# Patient Record
Sex: MALE | Race: WHITE | Employment: UNEMPLOYED | URBAN - METROPOLITAN AREA
[De-identification: names, ages, dates, MRNs, and addresses within clinical notes are randomized per-mention and may not be internally consistent; named-entity substitution may affect disease eponyms.]

---

## 2017-02-14 DIAGNOSIS — E03.4 HYPOTHYROIDISM DUE TO ACQUIRED ATROPHY OF THYROID: ICD-10-CM

## 2017-02-14 RX ORDER — BUSPIRONE HYDROCHLORIDE 7.5 MG/1
TABLET ORAL
Qty: 45 TAB | Refills: 0 | Status: SHIPPED | OUTPATIENT
Start: 2017-02-14 | End: 2017-04-20 | Stop reason: SDUPTHER

## 2017-02-14 RX ORDER — LEVOTHYROXINE SODIUM 300 UG/1
TABLET ORAL
Qty: 30 TAB | Refills: 0 | Status: SHIPPED | OUTPATIENT
Start: 2017-02-14 | End: 2017-04-21 | Stop reason: SDUPTHER

## 2017-03-19 NOTE — TELEPHONE ENCOUNTER
Patient is requesting a refill on  Requested Prescriptions     Pending Prescriptions Disp Refills    FLUoxetine (PROZAC) 20 mg capsule 30 Cap 5     Thank you

## 2017-03-20 RX ORDER — FLUOXETINE HYDROCHLORIDE 20 MG/1
CAPSULE ORAL
Qty: 30 CAP | Refills: 5 | OUTPATIENT
Start: 2017-03-20

## 2017-03-21 DIAGNOSIS — F41.8 DEPRESSION WITH ANXIETY: Primary | ICD-10-CM

## 2017-03-21 RX ORDER — OMEPRAZOLE 40 MG/1
CAPSULE, DELAYED RELEASE ORAL
Qty: 30 CAP | Refills: 0 | Status: SHIPPED | OUTPATIENT
Start: 2017-03-21 | End: 2017-04-20 | Stop reason: SDUPTHER

## 2017-03-21 RX ORDER — FLUOXETINE HYDROCHLORIDE 20 MG/1
CAPSULE ORAL
Qty: 30 CAP | Refills: 0 | Status: SHIPPED | OUTPATIENT
Start: 2017-03-21 | End: 2017-04-20 | Stop reason: ALTCHOICE

## 2017-03-21 NOTE — TELEPHONE ENCOUNTER
Requested Prescriptions     Pending Prescriptions Disp Refills    omeprazole (PRILOSEC) 40 mg capsule 30 Cap 0

## 2017-04-20 ENCOUNTER — OFFICE VISIT (OUTPATIENT)
Dept: FAMILY MEDICINE CLINIC | Age: 47
End: 2017-04-20

## 2017-04-20 VITALS
SYSTOLIC BLOOD PRESSURE: 124 MMHG | WEIGHT: 211 LBS | HEART RATE: 69 BPM | BODY MASS INDEX: 29.54 KG/M2 | OXYGEN SATURATION: 95 % | RESPIRATION RATE: 16 BRPM | HEIGHT: 71 IN | TEMPERATURE: 98.5 F | DIASTOLIC BLOOD PRESSURE: 81 MMHG

## 2017-04-20 DIAGNOSIS — I10 ESSENTIAL HYPERTENSION: ICD-10-CM

## 2017-04-20 DIAGNOSIS — E11.8 TYPE 2 DIABETES MELLITUS WITH COMPLICATION, WITHOUT LONG-TERM CURRENT USE OF INSULIN (HCC): Primary | ICD-10-CM

## 2017-04-20 DIAGNOSIS — E03.9 HYPOTHYROIDISM, UNSPECIFIED TYPE: ICD-10-CM

## 2017-04-20 DIAGNOSIS — Z28.21 REFUSED PNEUMOCOCCAL VACCINATION: ICD-10-CM

## 2017-04-20 DIAGNOSIS — F41.8 DEPRESSION WITH ANXIETY: ICD-10-CM

## 2017-04-20 DIAGNOSIS — Z13.220 SCREENING FOR HYPERCHOLESTEROLEMIA: ICD-10-CM

## 2017-04-20 DIAGNOSIS — K21.9 GASTROESOPHAGEAL REFLUX DISEASE WITHOUT ESOPHAGITIS: ICD-10-CM

## 2017-04-20 LAB
ALBUMIN UR QL STRIP: 10 MG/L
CREATININE, URINE POC: 50 MG/DL
MICROALBUMIN/CREAT RATIO POC: NORMAL MG/G

## 2017-04-20 RX ORDER — BISOPROLOL FUMARATE AND HYDROCHLOROTHIAZIDE 5; 6.25 MG/1; MG/1
TABLET ORAL
Qty: 90 TAB | Refills: 1 | Status: SHIPPED | OUTPATIENT
Start: 2017-04-20 | End: 2017-10-16 | Stop reason: SDUPTHER

## 2017-04-20 RX ORDER — OMEPRAZOLE 40 MG/1
CAPSULE, DELAYED RELEASE ORAL
Qty: 30 CAP | Refills: 2 | Status: SHIPPED | OUTPATIENT
Start: 2017-04-20 | End: 2018-03-28 | Stop reason: SDUPTHER

## 2017-04-20 RX ORDER — BUSPIRONE HYDROCHLORIDE 7.5 MG/1
7.5 TABLET ORAL 2 TIMES DAILY
Qty: 60 TAB | Refills: 2 | Status: SHIPPED | OUTPATIENT
Start: 2017-04-20 | End: 2017-07-20 | Stop reason: SDUPTHER

## 2017-04-20 RX ORDER — BUPROPION HYDROCHLORIDE 150 MG/1
150 TABLET ORAL
Qty: 30 TAB | Refills: 2 | Status: SHIPPED | OUTPATIENT
Start: 2017-04-20 | End: 2017-07-18 | Stop reason: SDUPTHER

## 2017-04-20 NOTE — PROGRESS NOTES
Chika Ballard. is a 48 y.o. male with history of HTN, hypothyroidism, diabetes mellitus, TRACY, renal calculi, anxiety and depression who presents with follow up of hypothyroidism, HTN, depression, anxiety and diabetes. History provided by: patient    HPI    Hypertension  Diet: poor appetite, eats tuna fish, chicken, salad, fried chicken, fruits, having lots of orange juice, milk  Exercise: yard work 3-4x/week for 4-5 hrs  BP at home: not checking  Compliance with medications: no medications for 5 days     Hypothyroidism  Takes synthroid 300 mcg M-F and skips Saturday to Sunday   Denies constipation, dry skin, dry hair, poor concentration, cold intolerance    Diabetes Mellitus  - Blood glucose at home: not checking  - Blood pressure at home: not checking  - Compliance with medications: taking metformin only once a day  - Patient on ASA - no, will reassess ASCVD risk factors today  - Patient on Statin- no, will reassess ASCVD risk factors today  - Patient on ACE- no  - Diet- poor appetite, eats tuna fish, chicken, salad, fried chicken, fruits, having lots of orange juice, milk  - Exercise - yard work 3-4x/week for 4-5 hrs  - Blood pressure today - 147/103 and repeat 124/81  - Last eye check - has not seen   - Last cholesterol check - today  - Last HbA1c - today  - Last urine microalbulmin- today, abnormal  - Last comprehensive foot exam - today  - Does the patient have a cardiologist- no  - Does the patient have a nephrologist- no  - Did patient receive pneumococcal vaccine - declined    Positive tingling on bilateral feet, nocturia, blurry vision    Depression/anxiety  Reports having a few deaths in the family, has not been doing well.  Is feeling sad and depressed  Sleep changes: not sleeping well  Interest (loss): yes, does not enjoy anything  Guilt (worthless): no  Energy (lack): yes  Cognition/C oncentration: no trouble  Appetite (wt. Loss): poor  Psychomotor retardation: no  SI/HI: no    Exercise: yard work 3-4x/week for 4-5 hrs  Current medication: prozac and buspar  Compliance with medication: No prozac for last couple of weeks and only using buspar prn  Side effects from the medication: no  Previous medications tried: wellbutrin XL, did well on that in the past and wishes to go back  Counseling: no  Psychiatrist: no    Tobacco abuse  1/2 PPD  Not ready to quit    Patient Active Problem List   Diagnosis Code    Hypothyroid E03.9    Kidney stone N20.0    Anxiety disorder F41.9    Depression with anxiety F41.8    Lipid disorder E78.9    Smoker F17.200    HTN (hypertension) I10    TRACY on CPAP G47.33, Z99.89    T2DM (type 2 diabetes mellitus) ([facility] [address] E11.9    Poor dentition K08.9    Stress and adjustment reaction F43.29          Current Outpatient Prescriptions:     busPIRone (BUSPAR) 7.5 mg tablet, Take 1 Tab by mouth two (2) times a day., Disp: 60 Tab, Rfl: 2    bisoprolol-hydroCHLOROthiazide (ZIAC) 5-6.25 mg per tablet, TAKE ONE TABLET BY MOUTH ONCE DAILY, Disp: 90 Tab, Rfl: 1    buPROPion XL (WELLBUTRIN XL) 150 mg tablet, Take 1 Tab by mouth every morning., Disp: 30 Tab, Rfl: 2    omeprazole (PRILOSEC) 40 mg capsule, TAKE ONE CAPSULE BY MOUTH ONCE DAILY, Disp: 30 Cap, Rfl: 2    levothyroxine (SYNTHROID) 300 mcg tablet, TAKE ONE TABLET BY MOUTH ONCE DAILY BEFORE BREAKFAST, Disp: 30 Tab, Rfl: 0    metFORMIN (GLUCOPHAGE) 1,000 mg tablet, TAKE ONE TABLET BY MOUTH TWICE DAILY WITH MEALS, Disp: 180 Tab, Rfl: 3     No Known Allergies     Past Medical History:   Diagnosis Date    Anxiety     Chest pain     Diverticulosis     Hypothyroid 7/20/2010    Kidney stone 7/20/2010     ROS  No numbness, weakness, chest pain, SOB    Physical Exam   Constitutional: He is oriented to person, place, and time and well-developed, well-nourished, and in no distress.    /81 (BP 1 Location: Right arm, BP Patient Position: Sitting)  Pulse 69  Temp 98.5 °F (36.9 °C) (Oral)   Resp 16  Ht 5' 11\" (1.803 m)  Wt 211 lb (95.7 kg)  SpO2 95%  BMI 29.43 kg/m2    HENT:   Head: Normocephalic and atraumatic. Neck: Neck supple. No carotid bruit bilaterally   Cardiovascular: Normal rate, regular rhythm, normal heart sounds and intact distal pulses. Exam reveals no gallop and no friction rub. No murmur heard. Pulmonary/Chest: Effort normal and breath sounds normal. No respiratory distress. He has no wheezes. He has no rales. Neurological: He is alert and oriented to person, place, and time. Psychiatric: Mood and affect normal.   Vitals reviewed. Diabetic Foot Exam:  Left/Right: Reflexes 2+     Vibratory sensation normal    Proprioception normal    Filament test normal sensation with micro filament   Pulse DP: 2+ (normal)   Pulse PT: 2+ (normal)   Deformities: None, No onychomycosis   Motor: moves all extremities       Data:  Urine microalbuminuria:  Component Results   Component Value Flag Ref Range Units Status   ALBUMIN, URINE POC 10  Negative mg/L Final   CREATININE, URINE POC 50   mg/dL Final   Microalbumin/creat ratio (POC)    mg/g Final   Comment:   Abnormal     Assessment/Plan:   Huong Chopra is a 48 y.o. male with history of HTN, hypothyroidism, diabetes mellitus, TRACY, renal calculi, anxiety and depression who presents with follow up of hypothyroidism, HTN, depression, anxiety and diabetes. ICD-10-CM ICD-9-CM    1. Type 2 diabetes mellitus with complication, without long-term current use of insulin (HCC) E11.8 250.90  DIABETES EYE EXAM       DIABETES FOOT EXAM      REFERRAL TO OPHTHALMOLOGY      HEMOGLOBIN A1C WITH EAG      LIPID PANEL      METABOLIC PANEL, COMPREHENSIVE      AMB POC URINE, MICROALBUMIN, SEMIQUANT (3 RESULTS)   2. Essential hypertension I10 401.9 bisoprolol-hydroCHLOROthiazide (ZIAC) 5-6.25 mg per tablet      METABOLIC PANEL, COMPREHENSIVE   3. Hypothyroidism, unspecified type E03.9 244.9 TSH 3RD GENERATION   4.  Depression with anxiety F41.8 300.4 busPIRone (BUSPAR) 7.5 mg tablet      buPROPion XL (WELLBUTRIN XL) 150 mg tablet      REFERRAL TO BEHAVIORAL HEALTH   5. Refused pneumococcal vaccination Z28.21 V64.06    6. Screening for hypercholesterolemia Z13.220 V77.91 LIPID PANEL   7. Gastroesophageal reflux disease without esophagitis K21.9 530.81 omeprazole (PRILOSEC) 40 mg capsule     1. Type 2 diabetes mellitus with complication, without long-term current use of insulin ([facility] [address]  Will check labs again. Continue metformin. Advised to take the metformin bid.   -  DIABETES EYE EXAM  -  DIABETES FOOT EXAM  - REFERRAL TO OPHTHALMOLOGY  - HEMOGLOBIN A1C WITH EAG  - LIPID PANEL  - METABOLIC PANEL, COMPREHENSIVE  - AMB POC URINE, MICROALBUMIN, SEMIQUANT (3 RESULTS)  - Exercise at least 150 mins/week. It has been shown to increase the lifespan of diabetics. I encourage an active lifestyle that includes regular exercise. - Follow a diabetic diet. For diet information go to [email or web address] or call [phone] to speak with a dietician at [facility]  - Diabetes is the leading cause of blindness in the [place] It is important that you see an eye doctor every year for a dilated retinal exam.   - You may benefit from the Diabetic Treatment [facility] at [facility]. For more information, please call [phone]  - will repeat urine microalbuminuria and if still elevated will need to start ACE inhibitor    2. Essential hypertension  BP goal <140/90. At goal on repeat read in office. Continue ziac  - bisoprolol-hydroCHLOROthiazide (ZIAC) 5-6.25 mg per tablet; TAKE ONE TABLET BY MOUTH ONCE DAILY  Dispense: 90 Tab; Refill: 1  - METABOLIC PANEL, COMPREHENSIVE  - check BP at home and bring readings for review  - Exercise at least 150 mins/week  - weight loss  - DASH diet  - discussed the risks of uncontrolled HTN including, but not limited to heart disease, heart attack, stroke     3. Hypothyroidism, unspecified type  No symptoms. Continue present dose of synthroid.  Will refill once TSH is back to know the appropriate dose.   - TSH 3RD GENERATION    4. Depression with anxiety  Continues to have symptoms. Off of prozac for 1 weel. Wishes to be on wellbutrin. Will start wellbutrin and schedule buspar  - busPIRone (BUSPAR) 7.5 mg tablet; Take 1 Tab by mouth two (2) times a day. Dispense: 60 Tab; Refill: 2  - buPROPion XL (WELLBUTRIN XL) 150 mg tablet; Take 1 Tab by mouth every morning. Dispense: 30 Tab; Refill: 2  - REFERRAL TO BEHAVIORAL HEALTH  - Exercise at least 150 mins/week   - counselor    5. Refused pneumococcal vaccination  Counseled    6. Screening for hypercholesterolemia  - LIPID PANEL    7. Gastroesophageal reflux disease without esophagitis  Refills   - omeprazole (PRILOSEC) 40 mg capsule; TAKE ONE CAPSULE BY MOUTH ONCE DAILY  Dispense: 30 Cap; Refill: 2    Follow-up Disposition:  Return in about 1 week (around 4/27/2017) for depression and anxiety. I have discussed the diagnosis with the patient and the intended plan as seen in the above orders. The patient has received an after-visit summary and questions were answered concerning future plans. I have discussed medication side effects and warnings with the patient as well.     Patient discussed with Dr Zara Rollins MD  Family Medicine Resident (PGY-3)  4/20/2017

## 2017-04-20 NOTE — PROGRESS NOTES
Chief Complaint   Patient presents with    Medication Refill     synthroid and bisoprolol     1. Have you been to the ER, urgent care clinic since your last visit? Hospitalized since your last visit? No    2. Have you seen or consulted any other health care providers outside of the [address] [place] [address] since your last visit? Include any pap smears or colon screening.  No      Wants to see if he can switch back to wellbutrin and xanax    Current anxiety medication is not working

## 2017-04-20 NOTE — MR AVS SNAPSHOT
Visit Information Date & Time Provider Department Dept. Phone Encounter #  
 4/20/2017  8:00 AM Princess Huizar, [facility] [phone] [id number] Follow-up Instructions Return in about 1 week (around 4/27/2017) for depression and anxiety. Upcoming Health Maintenance Date Due  
 EYE EXAM RETINAL OR DILATED Q1 9/20/1978 Pneumococcal 19-64 Medium Risk (1 of 1 - PPSV23) 9/20/1987 MICROALBUMIN Q1 4/9/2016 LIPID PANEL Q1 4/9/2016 INFLUENZA AGE 9 TO ADULT 8/1/2016 HEMOGLOBIN A1C Q6M 9/8/2016 FOOT EXAM Q1 3/8/2017 DTaP/Tdap/Td series (2 - Td) 5/14/2024 Allergies as of 4/20/2017  Review Complete On: 4/20/2017 By: Princess Huizar MD  
 No Known Allergies Current Immunizations  Reviewed on 4/9/2015 Name Date Tdap 5/14/2014 Not reviewed this visit You Were Diagnosed With   
  
 Codes Comments Type 2 diabetes mellitus with complication, without long-term current use of insulin (HCC)    -  Primary ICD-10-CM: E11.8 ICD-9-CM: 250.90 Essential hypertension     ICD-10-CM: I10 
ICD-9-CM: 401.9 Hypothyroidism, unspecified type     ICD-10-CM: E03.9 ICD-9-CM: 244.9 Depression with anxiety     ICD-10-CM: F41.8 ICD-9-CM: 300.4 Refused pneumococcal vaccination     ICD-10-CM: R15.32 ICD-9-CM: V64.06 Screening for hypercholesterolemia     ICD-10-CM: Z13.220 ICD-9-CM: V77.91 Vitals BP Pulse Temp Resp Height(growth percentile) Weight(growth percentile) 124/81 (BP 1 Location: Right arm, BP Patient Position: Sitting) 69 98.5 °F (36.9 °C) (Oral) 16 5' 11\" (1.803 m) 211 lb (95.7 kg) SpO2 BMI Smoking Status 95% 29.43 kg/m2 Current Every Day Smoker Vitals History BMI and BSA Data Body Mass Index Body Surface Area  
 29.43 kg/m 2 2.19 m 2 Preferred Pharmacy Pharmacy Name Phone [facility] [address], [address] [phone] Your Updated Medication List  
  
   
This list is accurate as of: 4/20/17  8:50 AM.  Always use your most recent med list.  
  
  
  
  
 bisoprolol-hydroCHLOROthiazide 5-6.25 mg per tablet Commonly known as:  [facility] TAKE ONE TABLET BY MOUTH ONCE DAILY  
  
 buPROPion  mg tablet Commonly known as:  [place] [facility] Take 1 Tab by mouth every morning. busPIRone 7.5 mg tablet Commonly known as:  BUSPAR Take 1 Tab by mouth two (2) times a day. levothyroxine 300 mcg tablet Commonly known as:  SYNTHROID  
TAKE ONE TABLET BY MOUTH ONCE DAILY BEFORE BREAKFAST  
  
 metFORMIN 1,000 mg tablet Commonly known as:  GLUCOPHAGE  
TAKE ONE TABLET BY MOUTH TWICE DAILY WITH MEALS  
  
 omeprazole 40 mg capsule Commonly known as:  PRILOSEC  
TAKE ONE CAPSULE BY MOUTH ONCE DAILY Prescriptions Sent to Pharmacy Refills  
 busPIRone (BUSPAR) 7.5 mg tablet 2 Sig: Take 1 Tab by mouth two (2) times a day. Class: Normal  
 Pharmacy: [facility] [address] [place] [address], [address] [place] [address] Ph #: [phone] Route: Oral  
 bisoprolol-hydroCHLOROthiazide (ZIAC) 5-6.25 mg per tablet 1 Sig: TAKE ONE TABLET BY MOUTH ONCE DAILY Class: Normal  
 Pharmacy: [id number] [place] [address] Ph #: [phone]  
 buPROPion XL (WELLBUTRIN XL) 150 mg tablet 2 Sig: Take 1 Tab by mouth every morning. Class: Normal  
 Pharmacy: [id number] [place] [address] Ph #: [phone] Route: Oral  
  
We Performed the Following AMB POC URINE, MICROALBUMIN, SEMIQUANT (3 RESULTS) [67001 CPT(R)] HEMOGLOBIN A1C WITH EAG [58261 CPT(R)]  DIABETES EYE EXAM [6 Custom]  DIABETES FOOT EXAM [7 Custom] LIPID PANEL [02528 CPT(R)] METABOLIC PANEL, COMPREHENSIVE [35710 CPT(R)] REFERRAL TO BEHAVIORAL HEALTH [REF8 Custom] Comments: Please evaluate and treat patient for anxiety and depression. REFERRAL TO OPHTHALMOLOGY [REF57 Custom] Comments:  
 Please evaluate patient for diabetic eye exam.  
 TSH 3RD GENERATION [41977 CPT(R)] Follow-up Instructions Return in about 1 week (around 4/27/2017) for depression and anxiety. Referral Information Referral ID Referred By Referred To  
  
 [id number] Tiffanie Olson Not Available Visits Status Start Date End Date 1 New Request 4/20/17 4/20/18 If your referral has a status of pending review or denied, additional information will be sent to support the outcome of this decision. Referral ID Referred By Referred To  
 [id number] Tiffanie Grad Not Available Visits Status Start Date End Date 1 New Request 4/20/17 4/20/18 If your referral has a status of pending review or denied, additional information will be sent to support the outcome of this decision. Patient Instructions - check BP at home and bring readings for review - Exercise at least 150 mins/week. It has been shown to increase the lifespan of diabetics. I encourage an active lifestyle that includes regular exercise. - Follow a diabetic diet. For diet information go to www. [employer]ATRIGHT. org or call [phone] to speak with a dietician at [facility] 
- Diabetes is the leading cause of blindness in the [place] It is important that you see an eye doctor every year for a dilated retinal exam.   
- You may benefit from the Diabetic Treatment Center at [facility]. For more information, please call [phone] 
- it is very important that you see a counselor, call your community service board Learning About Diabetes Food Guidelines Your Care Instructions Meal planning is important to manage diabetes. It helps keep your blood sugar at a target level (which you set with your doctor).  You don't have to eat special foods. You can eat what your family eats, including sweets once in a while. But you do have to pay attention to how often you eat and how much you eat of certain foods. You may want to work with a dietitian or a certified diabetes educator (CDE) to help you plan meals and snacks. A dietitian or CDE can also help you lose weight if that is one of your goals. What should you know about eating carbs? Managing the amount of carbohydrate (carbs) you eat is an important part of healthy meals when you have diabetes. Carbohydrate is found in many foods. · Learn which foods have carbs. And learn the amounts of carbs in different foods. ¨ Bread, cereal, pasta, and rice have about 15 grams of carbs in a serving. A serving is 1 slice of bread (1 ounce), ½ cup of cooked cereal, or 1/3 cup of cooked pasta or rice. ¨ Fruits have 15 grams of carbs in a serving. A serving is 1 small fresh fruit, such as an apple or orange; ½ of a banana; ½ cup of cooked or canned fruit; ½ cup of fruit juice; 1 cup of melon or raspberries; or 2 tablespoons of dried fruit. ¨ Milk and no-sugar-added yogurt have 15 grams of carbs in a serving. A serving is 1 cup of milk or 2/3 cup of no-sugar-added yogurt. ¨ Starchy vegetables have 15 grams of carbs in a serving. A serving is ½ cup of mashed potatoes or sweet potato; 1 cup winter squash; ½ of a small baked potato; ½ cup of cooked beans; or ½ cup cooked corn or green peas. · Learn how much carbs to eat each day and at each meal. A dietitian or CDE can teach you how to keep track of the amount of carbs you eat. This is called carbohydrate counting. · If you are not sure how to count carbohydrate grams, use the Plate Method to plan meals. It is a good, quick way to make sure that you have a balanced meal. It also helps you spread carbs throughout the day. ¨ Divide your plate by types of foods.  Put non-starchy vegetables on half the plate, meat or other protein food on one-quarter of the plate, and a grain or starchy vegetable in the final quarter of the plate. To this you can add a small piece of fruit and 1 cup of milk or yogurt, depending on how many carbs you are supposed to eat at a meal. 
· Try to eat about the same amount of carbs at each meal. Do not \"save up\" your daily allowance of carbs to eat at one meal. 
· Proteins have very little or no carbs per serving. Examples of proteins are beef, chicken, turkey, fish, eggs, tofu, cheese, cottage cheese, and peanut butter. A serving size of meat is 3 ounces, which is about the size of a deck of cards. Examples of meat substitute serving sizes (equal to 1 ounce of meat) are 1/4 cup of cottage cheese, 1 egg, 1 tablespoon of peanut butter, and ½ cup of tofu. How can you eat out and still eat healthy? · Learn to estimate the serving sizes of foods that have carbohydrate. If you measure food at home, it will be easier to estimate the amount in a serving of restaurant food. · If the meal you order has too much carbohydrate (such as potatoes, corn, or baked beans), ask to have a low-carbohydrate food instead. Ask for a salad or green vegetables. · If you use insulin, check your blood sugar before and after eating out to help you plan how much to eat in the future. · If you eat more carbohydrate at a meal than you had planned, take a walk or do other exercise. This will help lower your blood sugar. What else should you know? · Limit saturated fat, such as the fat from meat and dairy products. This is a healthy choice because people who have diabetes are at higher risk of heart disease. So choose lean cuts of meat and nonfat or low-fat dairy products. Use olive or canola oil instead of butter or shortening when cooking. · Don't skip meals. Your blood sugar may drop too low if you skip meals and take insulin or certain medicines for diabetes. · Check with your doctor before you drink alcohol. Alcohol can cause your blood sugar to drop too low. Alcohol can also cause a bad reaction if you take certain diabetes medicines. Follow-up care is a key part of your treatment and safety. Be sure to make and go to all appointments, and call your doctor if you are having problems. It's also a good idea to know your test results and keep a list of the medicines you take. Where can you learn more? Go to http://cathleen-filipe.info/. Enter D694 in the search box to learn more about \"Learning About Diabetes Food Guidelines. \" Current as of: May 23, 2016 Content Version: 11.2 © 5587-2403 [employer]. Care instructions adapted under license by [employer] (which disclaims liability or warranty for this information). If you have questions about a medical condition or this instruction, always ask your healthcare professional. [place] [address] any warranty or liability for your use of this information. Introducing [facility]! Dear Nina Sumner: 
Thank you for requesting a PayPar[employer] account. Our records indicate that you already have an active PayParrot account. You can access your account anytime at [email or web address] Did you know that you can access your hospital and ER discharge instructions at any time in PayParrot? You can also review all of your test results from your hospital stay or ER visit. Additional Information If you have questions, please visit the Frequently Asked Questions section of the Pay[employer]rot website at [email or web address] Remember, PayParrot is NOT to be used for urgent needs. For medical emergencies, dial 911. Now available from your iPhone and Android! Please provide this summary of care documentation to your next provider. Your primary care clinician is listed as Farzad Nieto.  If you have any questions after today's visit, please call [phone].

## 2017-04-21 ENCOUNTER — OFFICE VISIT (OUTPATIENT)
Dept: FAMILY MEDICINE CLINIC | Age: 47
End: 2017-04-21

## 2017-04-21 ENCOUNTER — TELEPHONE (OUTPATIENT)
Dept: FAMILY MEDICINE CLINIC | Age: 47
End: 2017-04-21

## 2017-04-21 VITALS
WEIGHT: 211 LBS | BODY MASS INDEX: 29.54 KG/M2 | DIASTOLIC BLOOD PRESSURE: 63 MMHG | OXYGEN SATURATION: 100 % | RESPIRATION RATE: 16 BRPM | HEIGHT: 71 IN | HEART RATE: 60 BPM | SYSTOLIC BLOOD PRESSURE: 102 MMHG | TEMPERATURE: 97.7 F

## 2017-04-21 DIAGNOSIS — R80.9 TYPE 2 DIABETES MELLITUS WITH MICROALBUMINURIA, WITHOUT LONG-TERM CURRENT USE OF INSULIN (HCC): ICD-10-CM

## 2017-04-21 DIAGNOSIS — E11.29 TYPE 2 DIABETES MELLITUS WITH MICROALBUMINURIA, WITHOUT LONG-TERM CURRENT USE OF INSULIN (HCC): ICD-10-CM

## 2017-04-21 DIAGNOSIS — E78.5 HYPERLIPIDEMIA, UNSPECIFIED HYPERLIPIDEMIA TYPE: ICD-10-CM

## 2017-04-21 DIAGNOSIS — E03.4 HYPOTHYROIDISM DUE TO ACQUIRED ATROPHY OF THYROID: Primary | ICD-10-CM

## 2017-04-21 LAB
ALBUMIN SERPL-MCNC: 4.8 G/DL (ref 3.5–5.5)
ALBUMIN/GLOB SERPL: 1.7 {RATIO} (ref 1.2–2.2)
ALP SERPL-CCNC: 76 IU/L (ref 39–117)
ALT SERPL-CCNC: 40 IU/L (ref 0–44)
AST SERPL-CCNC: 22 IU/L (ref 0–40)
BILIRUB SERPL-MCNC: 0.8 MG/DL (ref 0–1.2)
BUN SERPL-MCNC: 10 MG/DL (ref 6–24)
BUN/CREAT SERPL: 11 (ref 9–20)
CALCIUM SERPL-MCNC: 9.4 MG/DL (ref 8.7–10.2)
CHLORIDE SERPL-SCNC: 89 MMOL/L (ref 96–106)
CHOLEST SERPL-MCNC: 247 MG/DL (ref 100–199)
CO2 SERPL-SCNC: 23 MMOL/L (ref 18–29)
CREAT SERPL-MCNC: 0.92 MG/DL (ref 0.76–1.27)
EST. AVERAGE GLUCOSE BLD GHB EST-MCNC: 329 MG/DL
GLOBULIN SER CALC-MCNC: 2.9 G/DL (ref 1.5–4.5)
GLUCOSE SERPL-MCNC: 567 MG/DL (ref 65–99)
HBA1C MFR BLD: 13.1 % (ref 4.8–5.6)
HDLC SERPL-MCNC: 27 MG/DL
INTERPRETATION, 910389: NORMAL
LDLC SERPL CALC-MCNC: ABNORMAL MG/DL (ref 0–99)
Lab: NORMAL
POTASSIUM SERPL-SCNC: 5 MMOL/L (ref 3.5–5.2)
PROT SERPL-MCNC: 7.7 G/DL (ref 6–8.5)
SODIUM SERPL-SCNC: 133 MMOL/L (ref 134–144)
TRIGL SERPL-MCNC: 882 MG/DL (ref 0–149)
TSH SERPL DL<=0.005 MIU/L-ACNC: 95.61 UIU/ML (ref 0.45–4.5)
VLDLC SERPL CALC-MCNC: ABNORMAL MG/DL (ref 5–40)

## 2017-04-21 RX ORDER — INSULIN PUMP SYRINGE, 3 ML
EACH MISCELLANEOUS
Qty: 1 KIT | Refills: 0 | Status: SHIPPED | OUTPATIENT
Start: 2017-04-21

## 2017-04-21 RX ORDER — LEVOTHYROXINE SODIUM 300 UG/1
TABLET ORAL
Qty: 45 TAB | Refills: 1 | Status: SHIPPED | OUTPATIENT
Start: 2017-04-21 | End: 2017-10-02

## 2017-04-21 RX ORDER — GLIPIZIDE 5 MG/1
5 TABLET, FILM COATED, EXTENDED RELEASE ORAL DAILY
Qty: 30 TAB | Refills: 2 | Status: SHIPPED | OUTPATIENT
Start: 2017-04-21 | End: 2017-07-18 | Stop reason: SDUPTHER

## 2017-04-21 RX ORDER — LANCETS
EACH MISCELLANEOUS
Qty: 1 EACH | Refills: 11 | Status: SHIPPED | OUTPATIENT
Start: 2017-04-21

## 2017-04-21 RX ORDER — ISOPROPYL ALCOHOL 70 ML/100ML
SWAB TOPICAL
Qty: 100 PAD | Refills: 1 | Status: SHIPPED | OUTPATIENT
Start: 2017-04-21 | End: 2021-08-27 | Stop reason: ALTCHOICE

## 2017-04-21 RX ORDER — ATORVASTATIN CALCIUM 80 MG/1
80 TABLET, FILM COATED ORAL DAILY
Qty: 30 TAB | Refills: 5 | Status: SHIPPED | OUTPATIENT
Start: 2017-04-21 | End: 2017-10-05 | Stop reason: SDUPTHER

## 2017-04-21 NOTE — PROGRESS NOTES
Chief Complaint   Patient presents with    Abnormal Lab Results     Followup on recent visit / lab results.

## 2017-04-21 NOTE — PROGRESS NOTES
Abnormal labs found yesterday  TSH 95.6  Has not had free T4 -- recommend checking    Cholesterol levels are elevated    Diabetic    Placed on lipitor    HgbA1C = 13.1  Already on metformin  Put on oral hypoglycemic    I reviewed with the resident the medical history and the resident's findings on the physical examination. I discussed with the resident the patient's diagnosis and concur with the plan.

## 2017-04-21 NOTE — PROGRESS NOTES
Colvin Klinefelter. is a 48 y.o. male with history of HTN, hypothyroidism, diabetes mellitus, TRACY, renal calculi, anxiety and depression who presents with follow up of abnormal labs for hypothyroidism, HLD and diabetes. History provided by: patient    HPI    Hypothyroidism  Takes synthroid 300 mcg M-F and skips Saturday to Sunday. Does admit to missing some doses. Denies constipation, dry skin, dry hair, poor concentration, cold intolerance    Diabetes Mellitus  - Blood glucose at home: not checking  - Blood pressure at home: not checking  - Compliance with medications: taking metformin only once a day  - Patient on ASA - ASCVD risk 34%, need to start ASA  - Patient on Statin- ASCVD risk 34%, need to start statin  - Patient on ACE- no  - Diet- poor appetite, eats tuna fish, chicken, salad, fried chicken, fruits, having lots of orange juice, milk  - Exercise - yard work 3-4x/week for 4-5 hrs  - Blood pressure today - 102/63  - Last eye check - has not seen   - Last cholesterol check - yesterday  - Last HbA1c - yesterday  - Last urine microalbulmin- yesterday, abnormal  - Last comprehensive foot exam - 4/20/17  - Does the patient have a cardiologist- no  - Does the patient have a nephrologist- no  - Did patient receive pneumococcal vaccine - declined    Hyperlipidemia  Noted on labs yesterday. Not currently on statin    Patient Active Problem List   Diagnosis Code    Hypothyroid E03.9    Kidney stone N20.0    Anxiety disorder F41.9    Depression with anxiety F41.8    Lipid disorder E78.9    Smoker F17.200    HTN (hypertension) I10    TRACY on CPAP G47.33, Z99.89    T2DM (type 2 diabetes mellitus) (HCC) E11.9    Poor dentition K08.9    Stress and adjustment reaction F43.29      Current Outpatient Prescriptions:     levothyroxine (SYNTHROID) 300 mcg tablet, Take 1 tablet before breakfast Monday through Saturday.  No medicine Sunday, Disp: 45 Tab, Rfl: 1    atorvastatin (LIPITOR) 80 mg tablet, Take 1 Tab by mouth daily. , Disp: 30 Tab, Rfl: 5    glipiZIDE SR (GLUCOTROL XL) 5 mg CR tablet, Take 1 Tab by mouth daily. , Disp: 30 Tab, Rfl: 2    Blood-Glucose Meter monitoring kit, Use once a day, Disp: 1 Kit, Rfl: 0    Lancets misc, Use once a day with glucometer, Disp: 1 Each, Rfl: 11    glucose blood VI test strips (BLOOD GLUCOSE TEST) strip, Use once a day with glucometer, Disp: 100 Strip, Rfl: 1    alcohol swabs (ALCOHOL PADS) padm, Use once a day when checking blood glucose readings, Disp: 100 Pad, Rfl: 1    busPIRone (BUSPAR) 7.5 mg tablet, Take 1 Tab by mouth two (2) times a day., Disp: 60 Tab, Rfl: 2    bisoprolol-hydroCHLOROthiazide (ZIAC) 5-6.25 mg per tablet, TAKE ONE TABLET BY MOUTH ONCE DAILY, Disp: 90 Tab, Rfl: 1    buPROPion XL (WELLBUTRIN XL) 150 mg tablet, Take 1 Tab by mouth every morning., Disp: 30 Tab, Rfl: 2    omeprazole (PRILOSEC) 40 mg capsule, TAKE ONE CAPSULE BY MOUTH ONCE DAILY, Disp: 30 Cap, Rfl: 2    metFORMIN (GLUCOPHAGE) 1,000 mg tablet, TAKE ONE TABLET BY MOUTH TWICE DAILY WITH MEALS, Disp: 180 Tab, Rfl: 3     No Known Allergies     Past Medical History:   Diagnosis Date    Anxiety     Chest pain     Diverticulosis     Hypothyroid 7/20/2010    Kidney stone 7/20/2010       ROS  As stated in HPI    Physical Exam   Constitutional: He is well-developed, well-nourished, and in no distress. /63  Pulse 60  Temp 97.7 °F (36.5 °C) (Oral)   Resp 16  Ht 5' 11\" (1.803 m)  Wt 211 lb (95.7 kg)  SpO2 100%  BMI 29.43 kg/m2    Neck: Neck supple. No carotid bruit bilaterally   Cardiovascular: Normal rate, regular rhythm, normal heart sounds and intact distal pulses. Exam reveals no gallop and no friction rub. No murmur heard. Pulmonary/Chest: Effort normal and breath sounds normal. No respiratory distress. He has no wheezes. He has no rales. Vitals reviewed.     Assessment/Plan:   Julio Citizen. is a 48 y.o. male with history of HTN, hypothyroidism, diabetes mellitus, TRACY, renal calculi, anxiety and depression who presents with follow up of abnormal labs for hypothyroidism, HLD and diabetes. ICD-10-CM ICD-9-CM    1. Hypothyroidism due to acquired atrophy of thyroid E03.4 244.8 levothyroxine (SYNTHROID) 300 mcg tablet     246.8 T4, FREE      T3 TOTAL   2. Hyperlipidemia, unspecified hyperlipidemia type E78.5 272.4 atorvastatin (LIPITOR) 80 mg tablet   3. Type 2 diabetes mellitus with microalbuminuria, without long-term current use of insulin ([facility]) E11.29 250.40 glipiZIDE SR (GLUCOTROL XL) 5 mg CR tablet    R80.9 791.0 Blood-Glucose Meter monitoring kit      Lancets misc      glucose blood VI test strips (BLOOD GLUCOSE TEST) strip      alcohol swabs (ALCOHOL PADS) padm     1. Hypothyroidism due to acquired atrophy of thyroid  Elevated TSH on labs. Will increase synthroid to 300 mcg 6 times a week. Will also check fT4 and total T3. Will add on to labs done yesterday  - levothyroxine (SYNTHROID) 300 mcg tablet; Take 1 tablet before breakfast Monday through Saturday. No medicine Sunday  Dispense: 45 Tab; Refill: 1  - T4, FREE  - T3 TOTAL    2. Hyperlipidemia, unspecified hyperlipidemia type  Noted on labs yesterday. ASCVD risk 34%. Will start high intensity statin. - atorvastatin (LIPITOR) 80 mg tablet; Take 1 Tab by mouth daily. Dispense: 30 Tab; Refill: 5    3. Type 2 diabetes mellitus with microalbuminuria, without long-term current use of insulin ([facility])  HbA1c 13.1. Discussed starting insulin as A1c > 10. Patient declined starting insulin and would like to proceed with PO therapy. Will start sulfonylurea for further treatment. Discussed the importance of seeing ophthalmologist for diabetic eye exam.   - Exercise at least 150 mins/week   - check your blood sugar readings first thing in the morning, write it down and bring it in for review  - glipiZIDE SR (GLUCOTROL XL) 5 mg CR tablet; Take 1 Tab by mouth daily. Dispense: 30 Tab;  Refill: 2  - Blood-Glucose Meter monitoring kit; Use once a day  Dispense: 1 Kit; Refill: 0  - Lancets misc; Use once a day with glucometer  Dispense: 1 Each; Refill: 11  - glucose blood VI test strips (BLOOD GLUCOSE TEST) strip; Use once a day with glucometer  Dispense: 100 Strip; Refill: 1  - alcohol swabs (ALCOHOL PADS) padm; Use once a day when checking blood glucose readings  Dispense: 100 Pad; Refill: 1    Follow-up Disposition:  Return in about 1 week (around 4/28/2017) for Diabetes follow up., needs to start ASA on next visit    I have discussed the diagnosis with the patient and the intended plan as seen in the above orders. The patient has received an after-visit summary and questions were answered concerning future plans. I have discussed medication side effects and warnings with the patient as well.     Patient discussed with Dr Joe Marley MD  Family Medicine Resident (PGY-3)  4/22/2017

## 2017-04-21 NOTE — TELEPHONE ENCOUNTER
Left voicemail with patient to call us back regarding his most recent critical lab results. (Patient is encouraged to come into the clinic to see Dr. Estella Senior) at his earliest convenience.

## 2017-04-21 NOTE — PATIENT INSTRUCTIONS
- Exercise at least 150 mins/week   - check your blood sugar readings first thing in the morning, write it down and bring it in for review    [facility] (multiple locations across [address] [facility])  [phone]    Jennifer Thompson MD  [address] [place] [address] Ophthalmology  [address]  Jh Carvalho  Phone: [phone]  Fax: [phone]    [facility] Ophthalmology  [facility] CANCER [place] location  [facility] Department of Ophthalmology  [address] [facility], [address] [facility], [address]  Phone: [phone]   Fax: [phone]    [facility] [place] location  [address] [place] [address] of Ophthalmology   [address]  [address] [facility], [address]  Phone: [phone]   Fax: [phone]    Ophthalmology Clinic location  [facility] Department of Ophthalmology  [address] [facility], [address] [facility], [address]  Phone: [phone]  Fax: [phone]   Learning About [facility] by [facility] Instructions  Meal planning is important to manage diabetes. It helps keep your blood sugar at a target level (which you set with your doctor). You don't have to eat special foods. You can eat what your family eats, including sweets once in a while. But you do have to pay attention to how often you eat and how much you eat of certain foods. You may want to work with a dietitian or a certified diabetes educator (CDE) to help you plan meals and snacks. A dietitian or CDE can also help you lose weight if that is one of your goals. What should you know about eating carbs? Managing the amount of carbohydrate (carbs) you eat is an important part of healthy meals when you have diabetes. Carbohydrate is found in many foods. · Learn which foods have carbs. And learn the amounts of carbs in different foods. ¨ Bread, cereal, pasta, and rice have about 15 grams of carbs in a serving. A serving is 1 slice of bread (1 ounce), ½ cup of cooked cereal, or 1/3 cup of cooked pasta or rice.   ¨ Fruits have 15 grams of carbs in a serving. A serving is 1 small fresh fruit, such as an apple or orange; ½ of a banana; ½ cup of cooked or canned fruit; ½ cup of fruit juice; 1 cup of melon or raspberries; or 2 tablespoons of dried fruit. ¨ Milk and no-sugar-added yogurt have 15 grams of carbs in a serving. A serving is 1 cup of milk or 2/3 cup of no-sugar-added yogurt. ¨ Starchy vegetables have 15 grams of carbs in a serving. A serving is ½ cup of mashed potatoes or sweet potato; 1 cup winter squash; ½ of a small baked potato; ½ cup of cooked beans; or ½ cup cooked corn or green peas. · Learn how much carbs to eat each day and at each meal. A dietitian or CDE can teach you how to keep track of the amount of carbs you eat. This is called carbohydrate counting. · If you are not sure how to count carbohydrate grams, use the Plate Method to plan meals. It is a good, quick way to make sure that you have a balanced meal. It also helps you spread carbs throughout the day. ¨ Divide your plate by types of foods. Put non-starchy vegetables on half the plate, meat or other protein food on one-quarter of the plate, and a grain or starchy vegetable in the final quarter of the plate. To this you can add a small piece of fruit and 1 cup of milk or yogurt, depending on how many carbs you are supposed to eat at a meal.  · Try to eat about the same amount of carbs at each meal. Do not \"save up\" your daily allowance of carbs to eat at one meal.  · Proteins have very little or no carbs per serving. Examples of proteins are beef, chicken, turkey, fish, eggs, tofu, cheese, cottage cheese, and peanut butter. A serving size of meat is 3 ounces, which is about the size of a deck of cards. Examples of meat substitute serving sizes (equal to 1 ounce of meat) are 1/4 cup of cottage cheese, 1 egg, 1 tablespoon of peanut butter, and ½ cup of tofu. How can you eat out and still eat healthy?   · Learn to estimate the serving sizes of foods that have carbohydrate. If you measure food at home, it will be easier to estimate the amount in a serving of restaurant food. · If the meal you order has too much carbohydrate (such as potatoes, corn, or baked beans), ask to have a low-carbohydrate food instead. Ask for a salad or green vegetables. · If you use insulin, check your blood sugar before and after eating out to help you plan how much to eat in the future. · If you eat more carbohydrate at a meal than you had planned, take a walk or do other exercise. This will help lower your blood sugar. What else should you know? · Limit saturated fat, such as the fat from meat and dairy products. This is a healthy choice because people who have diabetes are at higher risk of heart disease. So choose lean cuts of meat and nonfat or low-fat dairy products. Use olive or canola oil instead of butter or shortening when cooking. · Don't skip meals. Your blood sugar may drop too low if you skip meals and take insulin or certain medicines for diabetes. · Check with your doctor before you drink alcohol. Alcohol can cause your blood sugar to drop too low. Alcohol can also cause a bad reaction if you take certain diabetes medicines. Follow-up care is a key part of your treatment and safety. Be sure to make and go to all appointments, and call your doctor if you are having problems. It's also a good idea to know your test results and keep a list of the medicines you take. Where can you learn more? Go to http://catlheen-filipe.info/. Enter J144 in the search box to learn more about \"Learning About Diabetes Food Guidelines. \"  Current as of: May 23, 2016  Content Version: 11.2  © 3478-3994 [employer]. Care instructions adapted under license by [employer] (which disclaims liability or warranty for this information).  If you have questions about a medical condition or this instruction, always ask your healthcare professional. [employer] disclaims any warranty or liability for your use of this information.

## 2017-04-21 NOTE — MR AVS SNAPSHOT
Visit Information Date & Time Provider Department Dept. Phone Encounter #  
 4/21/2017  2:25 PM Nikkie Hermosillo MD [address] [facility] [phone] [id number] Follow-up Instructions Return in about 1 week (around 4/28/2017) for Diabetes follow up. Upcoming Health Maintenance Date Due  
 EYE EXAM RETINAL OR DILATED Q1 9/20/1978 Pneumococcal 19-64 Medium Risk (1 of 1 - PPSV23) 9/20/1987 LIPID PANEL Q1 4/9/2016 INFLUENZA AGE 9 TO ADULT 8/1/2016 HEMOGLOBIN A1C Q6M 9/8/2016 FOOT EXAM Q1 4/20/2018 MICROALBUMIN Q1 4/20/2018 DTaP/Tdap/Td series (2 - Td) 5/14/2024 Allergies as of 4/21/2017  Review Complete On: 4/21/2017 By: Nikkie Hermosillo MD  
 No Known Allergies Current Immunizations  Reviewed on 4/9/2015 Name Date Tdap 5/14/2014 Not reviewed this visit You Were Diagnosed With   
  
 Codes Comments Hypothyroidism due to acquired atrophy of thyroid    -  Primary ICD-10-CM: E03.4 ICD-9-CM: 244.8, 246.8 Hyperlipidemia, unspecified hyperlipidemia type     ICD-10-CM: E78.5 ICD-9-CM: 272.4 Type 2 diabetes mellitus with microalbuminuria, without long-term current use of insulin (HCC)     ICD-10-CM: E11.29, R80.9 ICD-9-CM: 250.40, 791.0 Vitals BP Pulse Temp Resp Height(growth percentile) Weight(growth percentile) 102/63 60 97.7 °F (36.5 °C) (Oral) 16 5' 11\" (1.803 m) 211 lb (95.7 kg) SpO2 BMI Smoking Status 100% 29.43 kg/m2 Current Every Day Smoker BMI and BSA Data Body Mass Index Body Surface Area  
 29.43 kg/m 2 2.19 m 2 Preferred Pharmacy Pharmacy Name Phone [facility] [address], [address] [place] [address] [address]. [address] [phone] Your Updated Medication List  
  
   
This list is accurate as of: 4/21/17  3:02 PM.  Always use your most recent med list.  
  
  
  
  
 alcohol swabs Padm Commonly known as:  ALCOHOL PADS  
 Use once a day when checking blood glucose readings  
  
 atorvastatin 80 mg tablet Commonly known as:  LIPITOR Take 1 Tab by mouth daily. bisoprolol-hydroCHLOROthiazide 5-6.25 mg per tablet Commonly known as:  [facility] TAKE ONE TABLET BY MOUTH ONCE DAILY Blood-Glucose Meter monitoring kit Use once a day buPROPion  mg tablet Commonly known as:  Wadilink Kirit Take 1 Tab by mouth every morning. busPIRone 7.5 mg tablet Commonly known as:  BUSPAR Take 1 Tab by mouth two (2) times a day. glipiZIDE SR 5 mg CR tablet Commonly known as:  GLUCOTROL XL Take 1 Tab by mouth daily. glucose blood VI test strips strip Commonly known as:  blood glucose test  
Use once a day with glucometer Lancets Misc Use once a day with glucometer  
  
 levothyroxine 300 mcg tablet Commonly known as:  SYNTHROID Take 1 tablet before breakfast Monday through Saturday. No medicine Sunday  
  
 metFORMIN 1,000 mg tablet Commonly known as:  GLUCOPHAGE  
TAKE ONE TABLET BY MOUTH TWICE DAILY WITH MEALS  
  
 omeprazole 40 mg capsule Commonly known as:  PRILOSEC  
TAKE ONE CAPSULE BY MOUTH ONCE DAILY Prescriptions Sent to Pharmacy Refills  
 levothyroxine (SYNTHROID) 300 mcg tablet 1 Sig: Take 1 tablet before breakfast Monday through Saturday. No medicine Sunday Class: Normal  
 Pharmacy: [id number] [place] [address] Ph #: [phone]  
 atorvastatin (LIPITOR) 80 mg tablet 5 Sig: Take 1 Tab by mouth daily. Class: Normal  
 Pharmacy: [id number], [address] [place] [address] [address] [facility] Ph #: [phone] Route: Oral  
 glipiZIDE SR (GLUCOTROL XL) 5 mg CR tablet 2 Sig: Take 1 Tab by mouth daily. Class: Normal  
 Pharmacy: [id number] [place] [address], [address] [place] [address] [address] [facility] Ph #: [phone]  Route: Oral  
 Blood-Glucose Meter monitoring kit 0  
 Sig: Use once a day Class: Normal  
 Pharmacy: [address], [address] Ph #: [phone] Lancets misc 11 Sig: Use once a day with glucometer Class: Normal  
 Pharmacy: [address] [address] [facility] Ph #: [phone]  
 glucose blood VI test strips (BLOOD GLUCOSE TEST) strip 1 Sig: Use once a day with glucometer Class: Normal  
 Pharmacy: [address] [facility] Ph #: [phone]  
 alcohol swabs (ALCOHOL PADS) padm 1 Sig: Use once a day when checking blood glucose readings Class: Normal  
 Pharmacy: [id number], [address] [facility] Ph #: [phone] Follow-up Instructions Return in about 1 week (around 4/28/2017) for Diabetes follow up. Patient Instructions - Exercise at least 150 mins/week  
- check your blood sugar readings first thing in the morning, write it down and bring it in for review [employer] (multiple locations across [place]) 
[phone] Mortimer Done, MD 
[facility] [address] [address] Phone: [phone] Fax: [phone] [facility] location [facility] Department of Ophthalmology [address] [place] [address] [address] [address] Phone: [phone] Fax: [phone] [facility] location [facility] Department of Ophthalmology [address] [facility], [facility], [address] Phone: [phone] Fax: [phone] Ophthalmology Clinic location [facility] Department of Ophthalmology [address], [address] [place] [address] [address] Phone: [phone] Fax: [phone] Learning About Diabetes Food Guidelines Your Care Instructions Meal planning is important to manage diabetes.  It helps keep your blood sugar at a target level (which you set with your doctor). You don't have to eat special foods. You can eat what your family eats, including sweets once in a while. But you do have to pay attention to how often you eat and how much you eat of certain foods. You may want to work with a dietitian or a certified diabetes educator (CDE) to help you plan meals and snacks. A dietitian or CDE can also help you lose weight if that is one of your goals. What should you know about eating carbs? Managing the amount of carbohydrate (carbs) you eat is an important part of healthy meals when you have diabetes. Carbohydrate is found in many foods. · Learn which foods have carbs. And learn the amounts of carbs in different foods. ¨ Bread, cereal, pasta, and rice have about 15 grams of carbs in a serving. A serving is 1 slice of bread (1 ounce), ½ cup of cooked cereal, or 1/3 cup of cooked pasta or rice. ¨ Fruits have 15 grams of carbs in a serving. A serving is 1 small fresh fruit, such as an apple or orange; ½ of a banana; ½ cup of cooked or canned fruit; ½ cup of fruit juice; 1 cup of melon or raspberries; or 2 tablespoons of dried fruit. ¨ Milk and no-sugar-added yogurt have 15 grams of carbs in a serving. A serving is 1 cup of milk or 2/3 cup of no-sugar-added yogurt. ¨ Starchy vegetables have 15 grams of carbs in a serving. A serving is ½ cup of mashed potatoes or sweet potato; 1 cup winter squash; ½ of a small baked potato; ½ cup of cooked beans; or ½ cup cooked corn or green peas. · Learn how much carbs to eat each day and at each meal. A dietitian or CDE can teach you how to keep track of the amount of carbs you eat. This is called carbohydrate counting. · If you are not sure how to count carbohydrate grams, use the Plate Method to plan meals. It is a good, quick way to make sure that you have a balanced meal. It also helps you spread carbs throughout the day. ¨ Divide your plate by types of foods. Put non-starchy vegetables on half the plate, meat or other protein food on one-quarter of the plate, and a grain or starchy vegetable in the final quarter of the plate. To this you can add a small piece of fruit and 1 cup of milk or yogurt, depending on how many carbs you are supposed to eat at a meal. 
· Try to eat about the same amount of carbs at each meal. Do not \"save up\" your daily allowance of carbs to eat at one meal. 
· Proteins have very little or no carbs per serving. Examples of proteins are beef, chicken, turkey, fish, eggs, tofu, cheese, cottage cheese, and peanut butter. A serving size of meat is 3 ounces, which is about the size of a deck of cards. Examples of meat substitute serving sizes (equal to 1 ounce of meat) are 1/4 cup of cottage cheese, 1 egg, 1 tablespoon of peanut butter, and ½ cup of tofu. How can you eat out and still eat healthy? · Learn to estimate the serving sizes of foods that have carbohydrate. If you measure food at home, it will be easier to estimate the amount in a serving of restaurant food. · If the meal you order has too much carbohydrate (such as potatoes, corn, or baked beans), ask to have a low-carbohydrate food instead. Ask for a salad or green vegetables. · If you use insulin, check your blood sugar before and after eating out to help you plan how much to eat in the future. · If you eat more carbohydrate at a meal than you had planned, take a walk or do other exercise. This will help lower your blood sugar. What else should you know? · Limit saturated fat, such as the fat from meat and dairy products. This is a healthy choice because people who have diabetes are at higher risk of heart disease. So choose lean cuts of meat and nonfat or low-fat dairy products. Use olive or canola oil instead of butter or shortening when cooking. · Don't skip meals.  Your blood sugar may drop too low if you skip meals and take insulin or certain medicines for diabetes. · Check with your doctor before you drink alcohol. Alcohol can cause your blood sugar to drop too low. Alcohol can also cause a bad reaction if you take certain diabetes medicines. Follow-up care is a key part of your treatment and safety. Be sure to make and go to all appointments, and call your doctor if you are having problems. It's also a good idea to know your test results and keep a list of the medicines you take. Where can you learn more? Go to http://cathleen-filipe.info/. Enter X904 in the search box to learn more about \"Learning About Diabetes Food Guidelines. \" Current as of: May 23, 2016 Content Version: 11.2 © 7430-8488 [employer]apps. Care instructions adapted under license by [employer] (which disclaims liability or warranty for this information). If you have questions about a medical condition or this instruction, always ask your healthcare professional. [place] [address] any warranty or liability for your use of this information. Introducing hospitals & HEALTH SERVICES! Dear Evangelina Phillips: 
Thank you for requesting a youwho account. Our records indicate that you already have an active youwho account. You can access your account anytime at [email or web address] Manager Did you know that you can access your hospital and ER discharge instructions at any time in youwho? You can also review all of your test results from your hospital stay or ER visit. Additional Information If you have questions, please visit the Frequently Asked Questions section of the youwho website at [email or web address] Remember, youwho is NOT to be used for urgent needs. For medical emergencies, dial 911. Now available from your iPhone and Android! Please provide this summary of care documentation to your next provider. Your primary care clinician is listed as Lachelle Xiong. If you have any questions after today's visit, please call [phone].

## 2017-04-21 NOTE — TELEPHONE ENCOUNTER
Patient request a return call from nurse ASAP. Patient declined scheduling of appointment until he speak with nurse.

## 2017-04-24 LAB
SPECIMEN STATUS REPORT, ROLRST: NORMAL
T3 SERPL-MCNC: 73 NG/DL (ref 71–180)
T4 FREE SERPL-MCNC: 1 NG/DL (ref 0.82–1.77)

## 2017-04-25 ENCOUNTER — TELEPHONE (OUTPATIENT)
Dept: FAMILY MEDICINE CLINIC | Age: 47
End: 2017-04-25

## 2017-04-25 DIAGNOSIS — E03.9 HYPOTHYROIDISM, UNSPECIFIED TYPE: Primary | ICD-10-CM

## 2017-04-25 NOTE — TELEPHONE ENCOUNTER
Attempted to call patient. Left voicemail to call back. Katerina Easton MD  4/25/2017   11:28 AM     Addendum 11:47 AM   Patient returned call. fT4 and total T 3 inconsistent w/ TSH. Will refer to endocrinology. Alessio Coleman MD  [facility]  [address] [facility]  Phone: [phone]  Fax: [phone]    Advised to start baby ASA every day. Opportunity given to ask questions.      Katerina Easton MD  4/25/2017  11:50 AM

## 2017-04-27 ENCOUNTER — TELEPHONE (OUTPATIENT)
Dept: FAMILY MEDICINE CLINIC | Age: 47
End: 2017-04-27

## 2017-04-27 NOTE — TELEPHONE ENCOUNTER
Patient called stating he is concerned because his blood sugar was at 500 and he has been working all week to get it down. He got down to 262 yesterday 4/26/17 and today he said it jumped back up to 429 and would like to speak with a nurse. Call ended, nurse [address] Wil[address] Garcia will call back.

## 2017-04-27 NOTE — TELEPHONE ENCOUNTER
Spoke with patient advised him to schedule an appointment to be seen  Per Dr. Komal Bermudez notes from 4/21/2017 to return to clinic in one week.

## 2017-05-05 ENCOUNTER — TELEPHONE (OUTPATIENT)
Dept: FAMILY MEDICINE CLINIC | Age: 47
End: 2017-05-05

## 2017-05-05 NOTE — TELEPHONE ENCOUNTER
Called patient. States that eye sight better after wearing reading glasses. Discussed that he may fu at our office to get a visual acuity done but he will need to see an eye specialist eventually. Patient states he is in the process of getting the care card. Also dicussed seeing optometrist in the meanwhile possible at [facility]. Patient expressed full understanding.      Troy Arce MD  5/5/2017

## 2017-05-05 NOTE — TELEPHONE ENCOUNTER
Patient called to office to verify what insurance we have on file for him. Patient states he believes he have care card. Care Card  and therefore I provided patient with Patient Advocate Number to reapply ( [phone])    Patient concerned as he doesn't have insurance and was referred to [facility] and other specialist as well. Patient asking who will see him without insurance. Informed patient per Kerry Days and referrals to just call around and that he may have to just make payment arrangements. Patient notes that after his appt of 17, the weekend after his eye sight have turned from good to bad. Patient states his eyes are very blurry and he states he hope his eyesight haven't been damaged from when his sugars were high previously. I told patient I would inform MD of his call.

## 2017-07-18 DIAGNOSIS — F41.8 DEPRESSION WITH ANXIETY: ICD-10-CM

## 2017-07-18 DIAGNOSIS — R80.9 TYPE 2 DIABETES MELLITUS WITH MICROALBUMINURIA, WITHOUT LONG-TERM CURRENT USE OF INSULIN (HCC): ICD-10-CM

## 2017-07-18 DIAGNOSIS — E11.29 TYPE 2 DIABETES MELLITUS WITH MICROALBUMINURIA, WITHOUT LONG-TERM CURRENT USE OF INSULIN (HCC): ICD-10-CM

## 2017-07-20 DIAGNOSIS — F41.8 DEPRESSION WITH ANXIETY: ICD-10-CM

## 2017-07-21 RX ORDER — GLIPIZIDE 5 MG/1
TABLET, FILM COATED, EXTENDED RELEASE ORAL
Qty: 90 TAB | Refills: 2 | Status: SHIPPED | OUTPATIENT
Start: 2017-07-21 | End: 2018-04-16 | Stop reason: SDUPTHER

## 2017-07-21 RX ORDER — BUPROPION HYDROCHLORIDE 150 MG/1
TABLET ORAL
Qty: 30 TAB | Refills: 2 | Status: SHIPPED | OUTPATIENT
Start: 2017-07-21 | End: 2017-10-16 | Stop reason: SDUPTHER

## 2017-07-22 RX ORDER — BUSPIRONE HYDROCHLORIDE 7.5 MG/1
TABLET ORAL
Qty: 60 TAB | Refills: 0 | Status: SHIPPED | OUTPATIENT
Start: 2017-07-22 | End: 2017-09-01 | Stop reason: SDUPTHER

## 2017-08-03 DIAGNOSIS — E11.9 TYPE 2 DIABETES MELLITUS WITHOUT COMPLICATION, UNSPECIFIED LONG TERM INSULIN USE STATUS: ICD-10-CM

## 2017-08-03 DIAGNOSIS — R80.9 TYPE 2 DIABETES MELLITUS WITH MICROALBUMINURIA, WITHOUT LONG-TERM CURRENT USE OF INSULIN (HCC): Primary | ICD-10-CM

## 2017-08-03 DIAGNOSIS — E11.29 TYPE 2 DIABETES MELLITUS WITH MICROALBUMINURIA, WITHOUT LONG-TERM CURRENT USE OF INSULIN (HCC): Primary | ICD-10-CM

## 2017-08-03 RX ORDER — METFORMIN HYDROCHLORIDE 1000 MG/1
TABLET ORAL
Qty: 180 TAB | Refills: 0 | Status: SHIPPED | OUTPATIENT
Start: 2017-08-03 | End: 2017-11-13 | Stop reason: SDUPTHER

## 2017-09-01 DIAGNOSIS — F41.8 DEPRESSION WITH ANXIETY: ICD-10-CM

## 2017-09-01 RX ORDER — BUSPIRONE HYDROCHLORIDE 7.5 MG/1
TABLET ORAL
Qty: 60 TAB | Refills: 0 | Status: SHIPPED | OUTPATIENT
Start: 2017-09-01 | End: 2017-10-05 | Stop reason: SDUPTHER

## 2017-09-18 DIAGNOSIS — E03.4 HYPOTHYROIDISM DUE TO ACQUIRED ATROPHY OF THYROID: ICD-10-CM

## 2017-09-21 RX ORDER — LEVOTHYROXINE SODIUM 300 UG/1
TABLET ORAL
Qty: 45 TAB | Refills: 1 | OUTPATIENT
Start: 2017-09-21

## 2017-09-29 DIAGNOSIS — E03.4 HYPOTHYROIDISM DUE TO ACQUIRED ATROPHY OF THYROID: ICD-10-CM

## 2017-10-02 RX ORDER — LEVOTHYROXINE SODIUM 300 UG/1
TABLET ORAL
Qty: 30 TAB | Refills: 0 | Status: SHIPPED | OUTPATIENT
Start: 2017-10-02 | End: 2017-10-09 | Stop reason: SDUPTHER

## 2017-10-05 DIAGNOSIS — E03.4 HYPOTHYROIDISM DUE TO ACQUIRED ATROPHY OF THYROID: ICD-10-CM

## 2017-10-05 DIAGNOSIS — E78.5 HYPERLIPIDEMIA, UNSPECIFIED HYPERLIPIDEMIA TYPE: ICD-10-CM

## 2017-10-05 DIAGNOSIS — F41.8 DEPRESSION WITH ANXIETY: ICD-10-CM

## 2017-10-07 RX ORDER — BUSPIRONE HYDROCHLORIDE 7.5 MG/1
TABLET ORAL
Qty: 60 TAB | Refills: 0 | Status: SHIPPED | OUTPATIENT
Start: 2017-10-07 | End: 2017-11-13 | Stop reason: SDUPTHER

## 2017-10-09 RX ORDER — LEVOTHYROXINE SODIUM 300 UG/1
TABLET ORAL
Qty: 30 TAB | Refills: 0 | Status: SHIPPED | OUTPATIENT
Start: 2017-10-09 | End: 2017-12-14 | Stop reason: SDUPTHER

## 2017-10-15 RX ORDER — ATORVASTATIN CALCIUM 80 MG/1
TABLET, FILM COATED ORAL
Qty: 30 TAB | Refills: 0 | Status: SHIPPED | OUTPATIENT
Start: 2017-10-15 | End: 2017-11-13 | Stop reason: SDUPTHER

## 2017-10-15 NOTE — TELEPHONE ENCOUNTER
Will refill atorvastatin for 1 month. Needs to be seen in follow up prior to further refills. Please call to have patient schedule appointment.

## 2017-10-16 DIAGNOSIS — F41.8 DEPRESSION WITH ANXIETY: ICD-10-CM

## 2017-10-16 DIAGNOSIS — I10 ESSENTIAL HYPERTENSION: ICD-10-CM

## 2017-10-16 RX ORDER — BUPROPION HYDROCHLORIDE 150 MG/1
TABLET ORAL
Qty: 30 TAB | Refills: 2 | Status: SHIPPED | OUTPATIENT
Start: 2017-10-16 | End: 2018-02-08 | Stop reason: SDUPTHER

## 2017-11-02 RX ORDER — BISOPROLOL FUMARATE AND HYDROCHLOROTHIAZIDE 5; 6.25 MG/1; MG/1
TABLET ORAL
Qty: 90 TAB | Refills: 1 | Status: SHIPPED | OUTPATIENT
Start: 2017-11-02 | End: 2018-04-24 | Stop reason: ALTCHOICE

## 2017-11-08 ENCOUNTER — OFFICE VISIT (OUTPATIENT)
Dept: FAMILY MEDICINE CLINIC | Age: 47
End: 2017-11-08

## 2017-11-08 VITALS
BODY MASS INDEX: 31.58 KG/M2 | OXYGEN SATURATION: 98 % | TEMPERATURE: 97.6 F | DIASTOLIC BLOOD PRESSURE: 70 MMHG | WEIGHT: 225.6 LBS | HEART RATE: 61 BPM | RESPIRATION RATE: 18 BRPM | SYSTOLIC BLOOD PRESSURE: 113 MMHG | HEIGHT: 71 IN

## 2017-11-08 DIAGNOSIS — R80.9 TYPE 2 DIABETES MELLITUS WITH MICROALBUMINURIA, WITHOUT LONG-TERM CURRENT USE OF INSULIN (HCC): Primary | ICD-10-CM

## 2017-11-08 DIAGNOSIS — I10 ESSENTIAL HYPERTENSION: ICD-10-CM

## 2017-11-08 DIAGNOSIS — Z28.21 REFUSED PNEUMOCOCCAL VACCINATION: ICD-10-CM

## 2017-11-08 DIAGNOSIS — E78.5 HYPERLIPIDEMIA, UNSPECIFIED HYPERLIPIDEMIA TYPE: ICD-10-CM

## 2017-11-08 DIAGNOSIS — F17.200 SMOKER: ICD-10-CM

## 2017-11-08 DIAGNOSIS — E11.29 TYPE 2 DIABETES MELLITUS WITH MICROALBUMINURIA, WITHOUT LONG-TERM CURRENT USE OF INSULIN (HCC): Primary | ICD-10-CM

## 2017-11-08 DIAGNOSIS — Z28.21 INFLUENZA VACCINE REFUSED: ICD-10-CM

## 2017-11-08 DIAGNOSIS — E03.4 HYPOTHYROIDISM DUE TO ACQUIRED ATROPHY OF THYROID: ICD-10-CM

## 2017-11-08 DIAGNOSIS — F41.8 DEPRESSION WITH ANXIETY: ICD-10-CM

## 2017-11-08 LAB
ALBUMIN UR QL STRIP: 10 MG/L
CREATININE, URINE POC: 200 MG/DL
MICROALBUMIN/CREAT RATIO POC: <30 MG/G

## 2017-11-08 NOTE — PROGRESS NOTES
Chief Complaint   Patient presents with    Diabetes     f/u    Labs     1. Have you been to the ER, urgent care clinic since your last visit? Hospitalized since your last visit? No    2. Have you seen or consulted any other health care providers outside of the [address] [place] [address] since your last visit? Include any pap smears or colon screening.  No

## 2017-11-08 NOTE — MR AVS SNAPSHOT
Visit Information Date & Time Provider Department Dept. Phone Encounter #  
 11/8/2017  8:00 AM Neema Eli MD [facility] [phone] [id number] Upcoming Health Maintenance Date Due  
 EYE EXAM RETINAL OR DILATED Q1 9/20/1978 Pneumococcal 19-64 Medium Risk (1 of 1 - PPSV23) 9/20/1987 Influenza Age 5 to Adult 8/1/2017 HEMOGLOBIN A1C Q6M 10/20/2017 FOOT EXAM Q1 4/20/2018 MICROALBUMIN Q1 4/20/2018 LIPID PANEL Q1 4/20/2018 DTaP/Tdap/Td series (2 - Td) 5/14/2024 Allergies as of 11/8/2017  Review Complete On: 4/21/2017 By: Martha Ramirez MD  
 No Known Allergies Current Immunizations  Reviewed on 4/9/2015 Name Date Tdap 5/14/2014 Not reviewed this visit You Were Diagnosed With   
  
 Codes Comments Type 2 diabetes mellitus with microalbuminuria, without long-term current use of insulin (HCC)    -  Primary ICD-10-CM: E11.29, R80.9 ICD-9-CM: 250.40, 791.0 Hypothyroidism, unspecified type     ICD-10-CM: E03.9 ICD-9-CM: 244.9 Hyperlipidemia, unspecified hyperlipidemia type     ICD-10-CM: E78.5 ICD-9-CM: 272.4 Hypothyroidism due to acquired atrophy of thyroid     ICD-10-CM: E03.4 ICD-9-CM: 244.8, 246.8 Depression with anxiety     ICD-10-CM: F41.8 ICD-9-CM: 300.4 Essential hypertension     ICD-10-CM: I10 
ICD-9-CM: 401.9 Vitals BP Pulse Temp Resp Height(growth percentile) Weight(growth percentile) 113/70 (BP 1 Location: Left arm, BP Patient Position: Sitting) 61 97.6 °F (36.4 °C) (Oral) 18 5' 11\" (1.803 m) 225 lb 9.6 oz (102.3 kg) SpO2 BMI Smoking Status 98% 31.46 kg/m2 Current Every Day Smoker Vitals History BMI and BSA Data Body Mass Index Body Surface Area  
 31.46 kg/m 2 2.26 m 2 Preferred Pharmacy Pharmacy Name Phone [facility] [address], [address] [phone] Your Updated Medication List  
  
   
 This list is accurate as of: 11/8/17  8:39 AM.  Always use your most recent med list.  
  
  
  
  
 alcohol swabs Padm Commonly known as:  ALCOHOL PADS Use once a day when checking blood glucose readings  
  
 atorvastatin 80 mg tablet Commonly known as:  LIPITOR  
TAKE ONE TABLET BY MOUTH ONCE DAILY  
  
 bisoprolol-hydroCHLOROthiazide 5-6.25 mg per tablet Commonly known as:  [facility] TAKE ONE TABLET BY MOUTH ONCE DAILY Blood-Glucose Meter monitoring kit Use once a day buPROPion  mg tablet Commonly known as:  WELLBUTRIN XL  
TAKE ONE TABLET BY MOUTH ONCE DAILY IN THE MORNING  
  
 busPIRone 7.5 mg tablet Commonly known as:  BUSPAR  
TAKE ONE TABLET BY MOUTH TWICE DAILY  
  
 glipiZIDE SR 5 mg CR tablet Commonly known as:  GLUCOTROL XL  
TAKE ONE TABLET BY MOUTH ONCE DAILY  
  
 glucose blood VI test strips strip Commonly known as:  blood glucose test  
Use once a day with glucometer Lancets Misc Use once a day with glucometer  
  
 levothyroxine 300 mcg tablet Commonly known as:  SYNTHROID  
TAKE ONE TABLET BY MOUTH ONCE DAILY BEFORE  BREAKFAST.  
  
 metFORMIN 1,000 mg tablet Commonly known as:  GLUCOPHAGE  
TAKE ONE TABLET BY MOUTH TWICE DAILY WITH MEALS  
  
 omeprazole 40 mg capsule Commonly known as:  PRILOSEC  
TAKE ONE CAPSULE BY MOUTH ONCE DAILY We Performed the Following AMB POC URINE, MICROALBUMIN, SEMIQUANT (3 RESULTS) [61331 CPT(R)] CBC W/O DIFF [16708 CPT(R)] HEMOGLOBIN A1C WITH EAG [43764 CPT(R)] LIPID PANEL [62190 CPT(R)] METABOLIC PANEL, COMPREHENSIVE [07591 CPT(R)] REFERRAL TO DIABETIC EDUCATION [REF20 Custom] REFERRAL TO OPHTHALMOLOGY [REF57 Custom] TSH RFX ON ABNORMAL TO FREE T4 [[id number] Custom] Referral Information Referral ID Referred By Referred To  
  
 [id number] KRIS ANNA Not Available Visits Status Start Date End Date 1 New Request 11/8/17 11/8/18 If your referral has a status of pending review or denied, additional information will be sent to support the outcome of this decision. Referral ID Referred By Referred To  
 [id number] KRIS ANNA Not Available Visits Status Start Date End Date 1 New Request 11/8/17 11/8/18 If your referral has a status of pending review or denied, additional information will be sent to support the outcome of this decision. Patient Instructions Your Instructions: · Make appointment with me in 1 month. · Make appointment at next available [address] [facility] [address]. [address] [facility]: [phone] [facility]: [phone] [facility]: [phone] Mitali Monae: [phone] [facility]: [phone] 
 
[email or web address] Learning About Diabetes Food Guidelines Your Care Instructions Meal planning is important to manage diabetes. It helps keep your blood sugar at a target level (which you set with your doctor). You don't have to eat special foods. You can eat what your family eats, including sweets once in a while. But you do have to pay attention to how often you eat and how much you eat of certain foods. You may want to work with a dietitian or a certified diabetes educator (CDE) to help you plan meals and snacks. A dietitian or CDE can also help you lose weight if that is one of your goals. What should you know about eating carbs? Managing the amount of carbohydrate (carbs) you eat is an important part of healthy meals when you have diabetes. Carbohydrate is found in many foods. · Learn which foods have carbs. And learn the amounts of carbs in different foods. ¨ Bread, cereal, pasta, and rice have about 15 grams of carbs in a serving. A serving is 1 slice of bread (1 ounce), ½ cup of cooked cereal, or 1/3 cup of cooked pasta or rice. ¨ Fruits have 15 grams of carbs in a serving. A serving is 1 small fresh fruit, such as an apple or orange; ½ of a banana; ½ cup of cooked or canned fruit; ½ cup of fruit juice; 1 cup of melon or raspberries; or 2 tablespoons of dried fruit. ¨ Milk and no-sugar-added yogurt have 15 grams of carbs in a serving. A serving is 1 cup of milk or 2/3 cup of no-sugar-added yogurt. ¨ Starchy vegetables have 15 grams of carbs in a serving. A serving is ½ cup of mashed potatoes or sweet potato; 1 cup winter squash; ½ of a small baked potato; ½ cup of cooked beans; or ½ cup cooked corn or green peas. · Learn how much carbs to eat each day and at each meal. A dietitian or CDE can teach you how to keep track of the amount of carbs you eat. This is called carbohydrate counting. · If you are not sure how to count carbohydrate grams, use the Plate Method to plan meals. It is a good, quick way to make sure that you have a balanced meal. It also helps you spread carbs throughout the day. ¨ Divide your plate by types of foods. Put non-starchy vegetables on half the plate, meat or other protein food on one-quarter of the plate, and a grain or starchy vegetable in the final quarter of the plate. To this you can add a small piece of fruit and 1 cup of milk or yogurt, depending on how many carbs you are supposed to eat at a meal. 
· Try to eat about the same amount of carbs at each meal. Do not \"save up\" your daily allowance of carbs to eat at one meal. 
· Proteins have very little or no carbs per serving. Examples of proteins are beef, chicken, turkey, fish, eggs, tofu, cheese, cottage cheese, and peanut butter. A serving size of meat is 3 ounces, which is about the size of a deck of cards. Examples of meat substitute serving sizes (equal to 1 ounce of meat) are 1/4 cup of cottage cheese, 1 egg, 1 tablespoon of peanut butter, and ½ cup of tofu. How can you eat out and still eat healthy? · Learn to estimate the serving sizes of foods that have carbohydrate. If you measure food at home, it will be easier to estimate the amount in a serving of restaurant food. · If the meal you order has too much carbohydrate (such as potatoes, corn, or baked beans), ask to have a low-carbohydrate food instead. Ask for a salad or green vegetables. · If you use insulin, check your blood sugar before and after eating out to help you plan how much to eat in the future. · If you eat more carbohydrate at a meal than you had planned, take a walk or do other exercise. This will help lower your blood sugar. What else should you know? · Limit saturated fat, such as the fat from meat and dairy products. This is a healthy choice because people who have diabetes are at higher risk of heart disease. So choose lean cuts of meat and nonfat or low-fat dairy products. Use olive or canola oil instead of butter or shortening when cooking. · Don't skip meals. Your blood sugar may drop too low if you skip meals and take insulin or certain medicines for diabetes. · Check with your doctor before you drink alcohol. Alcohol can cause your blood sugar to drop too low. Alcohol can also cause a bad reaction if you take certain diabetes medicines. Follow-up care is a key part of your treatment and safety. Be sure to make and go to all appointments, and call your doctor if you are having problems. It's also a good idea to know your test results and keep a list of the medicines you take. Where can you learn more? Go to http://cathleen-filipe.info/. Enter Q831 in the search box to learn more about \"Learning About Diabetes Food Guidelines. \" Current as of: March 13, 2017 Content Version: 11.4 © 8253-8271 [employer]. Care instructions adapted under license by [employer] (which disclaims liability or warranty for this information).  If you have questions about a medical condition or this instruction, always ask your healthcare professional. [address] any warranty or liability for your use of this information. Learning About Meal Planning for Diabetes Why plan your meals? Meal planning can be a key part of managing diabetes. Planning meals and snacks with the right balance of carbohydrate, protein, and fat can help you keep your blood sugar at the target level you set with your doctor. You don't have to eat special foods. You can eat what your family eats, including sweets once in a while. But you do have to pay attention to how often you eat and how much you eat of certain foods. You may want to work with a dietitian or a certified diabetes educator. He or she can give you tips and meal ideas and can answer your questions about meal planning. This health professional can also help you reach a healthy weight if that is one of your goals. What plan is right for you? Your dietitian or diabetes educator may suggest that you start with the plate format or carbohydrate counting. The plate format The plate format is a simple way to help you manage how you eat. You plan meals by learning how much space each food should take on a plate. Using the plate format helps you spread carbohydrate throughout the day. It can make it easier to keep your blood sugar level within your target range. It also helps you see if you're eating healthy portion sizes. To use the plate format, you put non-starchy vegetables on half your plate. Add meat or meat substitutes on one-quarter of the plate. Put a grain or starchy vegetable (such as brown rice or a potato) on the final quarter of the plate. You can add a small piece of fruit and some low-fat or fat-free milk or yogurt, depending on your carbohydrate goal for each meal. 
Here are some tips for using the plate format: · Make sure that you are not using an oversized plate.  A 9-inch plate is best. Many restaurants use larger plates. · Get used to using the plate format at home. Then you can use it when you eat out. · Write down your questions about using the plate format. Talk to your doctor, a dietitian, or a diabetes educator about your concerns. Carbohydrate counting With carbohydrate counting, you plan meals based on the amount of carbohydrate in each food. Carbohydrate raises blood sugar higher and more quickly than any other nutrient. It is found in desserts, breads and cereals, and fruit. It's also found in starchy vegetables such as potatoes and corn, grains such as rice and pasta, and milk and yogurt. Spreading carbohydrate throughout the day helps keep your blood sugar levels within your target range. Your daily amount depends on several things, including your weight, how active you are, which diabetes medicines you take, and what your goals are for your blood sugar levels. A registered dietitian or diabetes educator can help you plan how much carbohydrate to include in each meal and snack. A guideline for your daily amount of carbohydrate is: · 45 to 60 grams at each meal. That's about the same as 3 to 4 carbohydrate servings. · 15 to 20 grams at each snack. That's about the same as 1 carbohydrate serving. The Nutrition Facts label on packaged foods tells you how much carbohydrate is in a serving of the food. First, look at the serving size on the food label. Is that the amount you eat in a serving? All of the nutrition information on a food label is based on that serving size. So if you eat more or less than that, you'll need to adjust the other numbers. Total carbohydrate is the next thing you need to look for on the label. If you count carbohydrate servings, one serving of carbohydrate is 15 grams. For foods that don't come with labels, such as fresh fruits and vegetables, you'll need a guide that lists carbohydrate in these foods. Ask your doctor, dietitian, or diabetes educator about books or other nutrition guides you can use. If you take insulin, you need to know how many grams of carbohydrate are in a meal. This lets you know how much rapid-acting insulin to take before you eat. If you use an insulin pump, you get a constant rate of insulin during the day. So the pump must be programmed at meals to give you extra insulin to cover the rise in blood sugar after meals. When you know how much carbohydrate you will eat, you can take the right amount of insulin. Or, if you always use the same amount of insulin, you need to make sure that you eat the same amount of carbohydrate at meals. If you need more help to understand carbohydrate counting and food labels, ask your doctor, dietitian, or diabetes educator. How do you get started with meal planning? Here are some tips to get started: 
· Plan your meals a week at a time. Don't forget to include snacks too. · Use cookbooks or online recipes to plan several main meals. Plan some quick meals for busy nights. You also can double some recipes that freeze well. Then you can save half for other busy nights when you don't have time to cook. · Make sure you have the ingredients you need for your recipes. If you're running low on basic items, put these items on your shopping list too. · List foods that you use to make breakfasts, lunches, and snacks. List plenty of fruits and vegetables. · Post this list on the refrigerator. Add to it as you think of more things you need. · Take the list to the store to do your weekly shopping. Follow-up care is a key part of your treatment and safety. Be sure to make and go to all appointments, and call your doctor if you are having problems. It's also a good idea to know your test results and keep a list of the medicines you take. Where can you learn more? Go to http://cathleen-filipe.info/. Moissé Villanueva in the search box to learn more about \"Learning About Meal Planning for Diabetes. \" Current as of: March 13, 2017 Content Version: 11.4 © 3085-2659 [employer]. Care instructions adapted under license by [employer] (which disclaims liability or warranty for this information). If you have questions about a medical condition or this instruction, always ask your healthcare professional. [place] [address] any warranty or liability for your use of this information. Introducing [facility] SERVICES! Dear Starla Bautista: 
Thank you for requesting a [facility] [employer] account. Our records indicate that you already have an active [facility] [employer] account. You can access your account anytime at [email or web address] Did you know that you can access your hospital and ER discharge instructions at any time in [employer] [employer]? You can also review all of your test results from your hospital stay or ER visit. Additional Information If you have questions, please visit the Frequently Asked Questions section of the [employer] website at [email or web address] Remember, [facility] [employer] is NOT to be used for urgent needs. For medical emergencies, dial 911. Now available from your iPhone and Android! Please provide this summary of care documentation to your next provider. Your primary care clinician is listed as Yue Rivas. If you have any questions after today's visit, please call [phone].

## 2017-11-08 NOTE — PROGRESS NOTES
[facility] with [facility]     Chief Complaint: \"I am here to follow up. \"    Sammie Arriola. is an 50 y.o. male with a history of HTN, hypothyroidism, T2DM, TRACY, depression/anxiety who presents for a follow up visit. History provided by the patient. Hypothyroidism:  Takes synthroid 300mg 6 days a week (skips Sundays) per discussion had at last visit. Doesn't miss doses. Noted to have markedly elevated TSH at last visit, but fT4 and total T3 were wnl. No fatigue, constipation, brain fog. No dry hair, dry skin. No cold intolerance. HLD:  Taking lipitor. Good compliance. No concerns. T2DM:   - Blood glucose at home: not checking--was checking previously. Thought that the machine was inaccurate. - Blood pressure at home: not checking  - Compliance with medications: takes metformin twice a day, takes glipizide in the morning. Good compliance with those. - Patient on ASA  yes  - Patient on Statin- yes  - Patient on ACE- no--has never been discussed with him that he needs to be on ACE.  - Diet  poor compliance with diet. Eating poptarts daily. Notes that he does like to eat tuna fish. Not many fruits/vegetables. - Exercise  yard work 3-4x/week for 4-5 hrs  - Blood pressure today - 113/70  - Last eye check  has not seen; does complain of visual symptoms today. Notes that his vision has \"gone crazy\". - Last cholesterol check - 4/2017  - Last HbA1c - 4/2017  - Last urine microalbulmin- 4/2017  - Last comprehensive foot exam  today  - Does the patient have a cardiologist- no  - Does the patient have a nephrologist- no  - Did patient receive pneumococcal vaccine - declined    HTN:  Poor dietary compliance--admits to eating a lot of salty foods. Takes ziac. Good compliance. Doesn't miss does. No headaches today. Does not check BP at home. Tobacco Abuse:  Smoking 0.5ppd.   Not interested in quitting smoking at this point. Depression/Anxiety:    PHQ over the last two weeks 11/8/2017   Little interest or pleasure in doing things Nearly every day   Feeling down, depressed or hopeless Nearly every day   Total Score PHQ 2 6   Trouble falling or staying asleep, or sleeping too much More than half the days   Feeling tired or having little energy More than half the days   Poor appetite or overeating Not at all   Feeling bad about yourself - or that you are a failure or have let yourself or your family down Several days   Trouble concentrating on things such as school, work, reading or watching TV Several days   Moving or speaking so slowly that other people could have noticed; or the opposite being so fidgety that others notice Not at all   Thoughts of being better off dead, or hurting yourself in some way Not at all   PHQ 9 Score 12     Allergies - reviewed:   No Known Allergies    Medications - reviewed:   Current Outpatient Prescriptions   Medication Sig    bisoprolol-hydroCHLOROthiazide (ZIAC) 5-6.25 mg per tablet TAKE ONE TABLET BY MOUTH ONCE DAILY    buPROPion XL (WELLBUTRIN XL) 150 mg tablet TAKE ONE TABLET BY MOUTH ONCE DAILY IN THE MORNING    atorvastatin (LIPITOR) 80 mg tablet TAKE ONE TABLET BY MOUTH ONCE DAILY    levothyroxine (SYNTHROID) 300 mcg tablet TAKE ONE TABLET BY MOUTH ONCE DAILY BEFORE  BREAKFAST.     busPIRone (BUSPAR) 7.5 mg tablet TAKE ONE TABLET BY MOUTH TWICE DAILY    metFORMIN (GLUCOPHAGE) 1,000 mg tablet TAKE ONE TABLET BY MOUTH TWICE DAILY WITH MEALS    glipiZIDE SR (GLUCOTROL XL) 5 mg CR tablet TAKE ONE TABLET BY MOUTH ONCE DAILY    Blood-Glucose Meter monitoring kit Use once a day    Lancets misc Use once a day with glucometer    glucose blood VI test strips (BLOOD GLUCOSE TEST) strip Use once a day with glucometer    alcohol swabs (ALCOHOL PADS) padm Use once a day when checking blood glucose readings    omeprazole (PRILOSEC) 40 mg capsule TAKE ONE CAPSULE BY MOUTH ONCE DAILY     No current facility-administered medications for this visit. I have reviewed and updated the histories as listed below:    Past Medical History - reviewed:  Past Medical History:   Diagnosis Date    Anxiety     Chest pain     Diverticulosis     Hypothyroid 7/20/2010    Kidney stone 7/20/2010         Past Surgical History - reviewed:   Past Surgical History:   Procedure Laterality Date    HX HEENT  1982    jaw surgery         Social History - reviewed:  Social History     Social History    Marital status: SINGLE     Spouse name: N/A    Number of children: N/A    Years of education: N/A     Occupational History    Not on file. Social History Main Topics    Smoking status: Current Every Day Smoker     Packs/day: 0.25     Years: 20.00     Types: Cigarettes    Smokeless tobacco: Former User     Types: Snuff    Alcohol use No      Comment: occasionally once a month    Drug use: No      Comment: 1/2 pack a day    Sexual activity: Yes     Partners: Female     Other Topics Concern    Not on file     Social History Narrative         Family History - reviewed:  Family History   Problem Relation Age of Onset    Hypertension Mother     Heart Disease Mother      valve placement     Hypertension Father     Asthma Father          Immunizations - reviewed:   Immunization History   Administered Date(s) Administered    Tdap 05/14/2014     Flu: declines. Pneumovax: declines. Review of Systems  Review of Systems   Constitutional: Negative for activity change, chills and fever. HENT: Negative for congestion. Eyes: Positive for visual disturbance (notes that he has increaed blurry vision. ). Respiratory: Negative for shortness of breath. Cardiovascular: Negative for chest pain. Gastrointestinal: Negative for abdominal distention. Musculoskeletal: Negative for arthralgias and myalgias. Neurological: Negative for headaches.    Psychiatric/Behavioral: Positive for dysphoric mood and sleep disturbance. Negative for agitation, behavioral problems, confusion, decreased concentration, self-injury and suicidal ideas. The patient is nervous/anxious. All other systems reviewed and are negative. Physical Exam    Visit Vitals    /70 (BP 1 Location: Left arm, BP Patient Position: Sitting)    Pulse 61    Temp 97.6 °F (36.4 °C) (Oral)    Resp 18    Ht 5' 11\" (1.803 m)    Wt 225 lb 9.6 oz (102.3 kg)    SpO2 98%    BMI 31.46 kg/m2       Physical Exam   Constitutional: He is oriented to person, place, and time. He appears well-developed and well-nourished. No distress. Obese. HENT:   Head: Normocephalic. Mouth/Throat: Oropharynx is clear and moist. No oropharyngeal exudate. Eyes: Pupils are equal, round, and reactive to light. Neck: Normal range of motion. No thyromegaly present. Cardiovascular: Normal rate, regular rhythm, normal heart sounds and intact distal pulses. Exam reveals no gallop and no friction rub. No murmur heard. Pulmonary/Chest: Effort normal and breath sounds normal. He has no wheezes. He has no rales. He exhibits no tenderness. Abdominal: Soft. Bowel sounds are normal. He exhibits no distension and no mass. There is no tenderness. There is no rebound and no guarding. Musculoskeletal: Normal range of motion. He exhibits no edema. Lymphadenopathy:     He has no cervical adenopathy. Neurological: He is alert and oriented to person, place, and time. No cranial nerve deficit. Skin: Skin is warm and dry. He is not diaphoretic. Psychiatric:   Has depressed mood. No SI/HI. Nursing note and vitals reviewed. I have personally reviewed notes from Dr. Verena Pak (4/2017 visits). Assessment    ICD-10-CM ICD-9-CM    1.  Type 2 diabetes mellitus with microalbuminuria, without long-term current use of insulin ([facility]) E11.29 250.40 HEMOGLOBIN A1C WITH EAG    R80.9 791.0 AMB POC URINE, MICROALBUMIN, SEMIQUANT (3 RESULTS)      REFERRAL TO DIABETIC EDUCATION REFERRAL TO OPHTHALMOLOGY      METABOLIC PANEL, COMPREHENSIVE      CBC W/O DIFF      CANCELED: METABOLIC PANEL, BASIC   2. Hyperlipidemia, unspecified hyperlipidemia type E78.5 272.4 LIPID PANEL   3. Hypothyroidism due to acquired atrophy of thyroid E03.4 244.8 TSH RFX ON ABNORMAL TO FREE T4     246.8    4. Depression with anxiety F41.8 300.4 AK DEPRESSION SCREEN ANNUAL   5. Essential hypertension I10 401.9    6. Refused pneumococcal vaccination Z28.21 V64.06    7. Smoker F17.200 305.1    8. Influenza vaccine refused Z28.21 V64.06      Plan  1. Type 2 diabetes mellitus with microalbuminuria, without long-term current use of insulin ([facility]) - Last A1c was 13.1 6 months ago. Patient was resistant to starting insulin therapy at that time. Maximized medical therapy with metformin BID and glipizide. Has been compliant with these medicines since he was last here. Will plan to recheck A1c today. Will also draw CBC and CMP. Does have some microalbuminura documented from previously. Also with vision changes today--making referral to ophthalmology (may be difficult as patient has care card). Will also place referral to diabetes education today.  - HEMOGLOBIN A1C WITH EAG  - AMB POC URINE, MICROALBUMIN, SEMIQUANT (3 RESULTS)  - REFERRAL TO DIABETIC EDUCATION  - REFERRAL TO OPHTHALMOLOGY  - METABOLIC PANEL, COMPREHENSIVE  - CBC W/O DIFF    2. Hypothyroidism due to acquired atrophy of thyroid - last TSH was markedly elevated, but free T4 and T3 were normal.  Redrawing TSH today. Asymptomatic. Recommend staying on current dose of synthroid. - TSH RFX ON ABNORMAL TO FREE T4    3. Hyperlipidemia, unspecified hyperlipidemia type - on high intensity statin now. Tolerating this well. Continue this therapy. Drawing lipid panel today as patient is fasting.  - LIPID PANEL    5. Depression with anxiety - continues to have elevated PHQ-9 score today. No SI/HI demonstrated.   Taking Wellbutrin and buspar (did not tolerate SSRI in the past). Has not seen therapist 2/2 financial reasons. Referral made to [facility] in the past, but he never made appointment.  -Encouraging to make [facility] appointment today.  -Continue wellbutrin, buspar. 6. Essential hypertension - BP at goal today. 113/70. On Ziac. Not taking ACE-I. He should be on ACE-I for renal protection. Did not discuss this with patient today. Will discuss this with him at next visit.  -Taylor Nichols. Need to discuss adding low-dose ACE-I at next visit. 6. Refused pneumococcal vaccination - counseled. 7. Smoker - not ready for smoking cessation as of yet. Counseled. 8. Influenza vaccine refused - counseled. Follow-up Disposition:  Return in about 1 month (around 12/8/2017) for follow up visit and discuss lab results. I have discussed the diagnosis with the patient and the intended plan as seen in the above orders. Patient verbalized understanding of the plan and agrees with the plan. The patient has received an after-visit summary and questions were answered concerning future plans. I have discussed medication side effects and warnings with the patient as well. Informed patient to return to the office if new symptoms arise. Patient was discussed with Dr. Kateryna Caro.     Tiara Golden MD  Family Medicine Resident

## 2017-11-08 NOTE — PATIENT INSTRUCTIONS
Your Instructions:  · Make appointment with me in 1 month. · Make appointment at next available [address] [facility] slot. [address]: [phone]  [facility]: [phone]  [facility]: [phone]  [place] : [phone]  [place]: [phone]    [email or web address]    [facility] (multiple locations across [place])  [phone] them you have [place] though [facility] and ask about financial assistance. Learning About Diabetes Food Guidelines  Your Care Instructions    Meal planning is important to manage diabetes. It helps keep your blood sugar at a target level (which you set with your doctor). You don't have to eat special foods. You can eat what your family eats, including sweets once in a while. But you do have to pay attention to how often you eat and how much you eat of certain foods. You may want to work with a dietitian or a certified diabetes educator (CDE) to help you plan meals and snacks. A dietitian or CDE can also help you lose weight if that is one of your goals. What should you know about eating carbs? Managing the amount of carbohydrate (carbs) you eat is an important part of healthy meals when you have diabetes. Carbohydrate is found in many foods. · Learn which foods have carbs. And learn the amounts of carbs in different foods. ¨ Bread, cereal, pasta, and rice have about 15 grams of carbs in a serving. A serving is 1 slice of bread (1 ounce), ½ cup of cooked cereal, or 1/3 cup of cooked pasta or rice. ¨ Fruits have 15 grams of carbs in a serving. A serving is 1 small fresh fruit, such as an apple or orange; ½ of a banana; ½ cup of cooked or canned fruit; ½ cup of fruit juice; 1 cup of melon or raspberries; or 2 tablespoons of dried fruit. ¨ Milk and no-sugar-added yogurt have 15 grams of carbs in a serving.  A serving is 1 cup of milk or 2/3 cup of no-sugar-added yogurt. ¨ Starchy vegetables have 15 grams of carbs in a serving. A serving is ½ cup of mashed potatoes or sweet potato; 1 cup winter squash; ½ of a small baked potato; ½ cup of cooked beans; or ½ cup cooked corn or green peas. · Learn how much carbs to eat each day and at each meal. A dietitian or CDE can teach you how to keep track of the amount of carbs you eat. This is called carbohydrate counting. · If you are not sure how to count carbohydrate grams, use the Plate Method to plan meals. It is a good, quick way to make sure that you have a balanced meal. It also helps you spread carbs throughout the day. ¨ Divide your plate by types of foods. Put non-starchy vegetables on half the plate, meat or other protein food on one-quarter of the plate, and a grain or starchy vegetable in the final quarter of the plate. To this you can add a small piece of fruit and 1 cup of milk or yogurt, depending on how many carbs you are supposed to eat at a meal.  · Try to eat about the same amount of carbs at each meal. Do not \"save up\" your daily allowance of carbs to eat at one meal.  · Proteins have very little or no carbs per serving. Examples of proteins are beef, chicken, turkey, fish, eggs, tofu, cheese, cottage cheese, and peanut butter. A serving size of meat is 3 ounces, which is about the size of a deck of cards. Examples of meat substitute serving sizes (equal to 1 ounce of meat) are 1/4 cup of cottage cheese, 1 egg, 1 tablespoon of peanut butter, and ½ cup of tofu. How can you eat out and still eat healthy? · Learn to estimate the serving sizes of foods that have carbohydrate. If you measure food at home, it will be easier to estimate the amount in a serving of restaurant food. · If the meal you order has too much carbohydrate (such as potatoes, corn, or baked beans), ask to have a low-carbohydrate food instead. Ask for a salad or green vegetables.   · If you use insulin, check your blood sugar before and after eating out to help you plan how much to eat in the future. · If you eat more carbohydrate at a meal than you had planned, take a walk or do other exercise. This will help lower your blood sugar. What else should you know? · Limit saturated fat, such as the fat from meat and dairy products. This is a healthy choice because people who have diabetes are at higher risk of heart disease. So choose lean cuts of meat and nonfat or low-fat dairy products. Use olive or canola oil instead of butter or shortening when cooking. · Don't skip meals. Your blood sugar may drop too low if you skip meals and take insulin or certain medicines for diabetes. · Check with your doctor before you drink alcohol. Alcohol can cause your blood sugar to drop too low. Alcohol can also cause a bad reaction if you take certain diabetes medicines. Follow-up care is a key part of your treatment and safety. Be sure to make and go to all appointments, and call your doctor if you are having problems. It's also a good idea to know your test results and keep a list of the medicines you take. Where can you learn more? Go to http://cathleen-filipe.info/. Enter V963 in the search box to learn more about \"Learning About Diabetes Food Guidelines. \"  Current as of: March 13, 2017  Content Version: 11.4  © 8223-9874 [employer]. Care instructions adapted under license by [employer] (which disclaims liability or warranty for this information). If you have questions about a medical condition or this instruction, always ask your healthcare professional. [place] [address] any warranty or liability for your use of this information. Learning About Meal Planning for Diabetes  Why plan your meals? Meal planning can be a key part of managing diabetes.  Planning meals and snacks with the right balance of carbohydrate, protein, and fat can help you keep your blood sugar at the target level you set with your doctor. You don't have to eat special foods. You can eat what your family eats, including sweets once in a while. But you do have to pay attention to how often you eat and how much you eat of certain foods. You may want to work with a dietitian or a certified diabetes educator. He or she can give you tips and meal ideas and can answer your questions about meal planning. This health professional can also help you reach a healthy weight if that is one of your goals. What plan is right for you? Your dietitian or diabetes educator may suggest that you start with the plate format or carbohydrate counting. The plate format  The plate format is a simple way to help you manage how you eat. You plan meals by learning how much space each food should take on a plate. Using the plate format helps you spread carbohydrate throughout the day. It can make it easier to keep your blood sugar level within your target range. It also helps you see if you're eating healthy portion sizes. To use the plate format, you put non-starchy vegetables on half your plate. Add meat or meat substitutes on one-quarter of the plate. Put a grain or starchy vegetable (such as brown rice or a potato) on the final quarter of the plate. You can add a small piece of fruit and some low-fat or fat-free milk or yogurt, depending on your carbohydrate goal for each meal.  Here are some tips for using the plate format:  · Make sure that you are not using an oversized plate. A 9-inch plate is best. Many restaurants use larger plates. · Get used to using the plate format at home. Then you can use it when you eat out. · Write down your questions about using the plate format. Talk to your doctor, a dietitian, or a diabetes educator about your concerns. Carbohydrate counting  With carbohydrate counting, you plan meals based on the amount of carbohydrate in each food.  Carbohydrate raises blood sugar higher and more quickly than any other nutrient. It is found in desserts, breads and cereals, and fruit. It's also found in starchy vegetables such as potatoes and corn, grains such as rice and pasta, and milk and yogurt. Spreading carbohydrate throughout the day helps keep your blood sugar levels within your target range. Your daily amount depends on several things, including your weight, how active you are, which diabetes medicines you take, and what your goals are for your blood sugar levels. A registered dietitian or diabetes educator can help you plan how much carbohydrate to include in each meal and snack. A guideline for your daily amount of carbohydrate is:  · 45 to 60 grams at each meal. That's about the same as 3 to 4 carbohydrate servings. · 15 to 20 grams at each snack. That's about the same as 1 carbohydrate serving. The Nutrition Facts label on packaged foods tells you how much carbohydrate is in a serving of the food. First, look at the serving size on the food label. Is that the amount you eat in a serving? All of the nutrition information on a food label is based on that serving size. So if you eat more or less than that, you'll need to adjust the other numbers. Total carbohydrate is the next thing you need to look for on the label. If you count carbohydrate servings, one serving of carbohydrate is 15 grams. For foods that don't come with labels, such as fresh fruits and vegetables, you'll need a guide that lists carbohydrate in these foods. Ask your doctor, dietitian, or diabetes educator about books or other nutrition guides you can use. If you take insulin, you need to know how many grams of carbohydrate are in a meal. This lets you know how much rapid-acting insulin to take before you eat. If you use an insulin pump, you get a constant rate of insulin during the day. So the pump must be programmed at meals to give you extra insulin to cover the rise in blood sugar after meals.   When you know how much carbohydrate you will eat, you can take the right amount of insulin. Or, if you always use the same amount of insulin, you need to make sure that you eat the same amount of carbohydrate at meals. If you need more help to understand carbohydrate counting and food labels, ask your doctor, dietitian, or diabetes educator. How do you get started with meal planning? Here are some tips to get started:  · Plan your meals a week at a time. Don't forget to include snacks too. · Use cookbooks or online recipes to plan several main meals. Plan some quick meals for busy nights. You also can double some recipes that freeze well. Then you can save half for other busy nights when you don't have time to cook. · Make sure you have the ingredients you need for your recipes. If you're running low on basic items, put these items on your shopping list too. · List foods that you use to make breakfasts, lunches, and snacks. List plenty of fruits and vegetables. · Post this list on the refrigerator. Add to it as you think of more things you need. · Take the list to the store to do your weekly shopping. Follow-up care is a key part of your treatment and safety. Be sure to make and go to all appointments, and call your doctor if you are having problems. It's also a good idea to know your test results and keep a list of the medicines you take. Where can you learn more? Go to http://cathleen-filipe.info/. Reinaldo Garcia in the search box to learn more about \"Learning About Meal Planning for Diabetes. \"  Current as of: March 13, 2017  Content Version: 11.4  © 2028-0190 [employer]. Care instructions adapted under license by [employer] (which disclaims liability or warranty for this information).  If you have questions about a medical condition or this instruction, always ask your healthcare professional. [address] any warranty or liability for your use of this information.

## 2017-11-09 LAB
ALBUMIN SERPL-MCNC: 4.4 G/DL (ref 3.5–5.5)
ALBUMIN/GLOB SERPL: 1.8 {RATIO} (ref 1.2–2.2)
ALP SERPL-CCNC: 63 IU/L (ref 39–117)
ALT SERPL-CCNC: 30 IU/L (ref 0–44)
AST SERPL-CCNC: 16 IU/L (ref 0–40)
BILIRUB SERPL-MCNC: 0.8 MG/DL (ref 0–1.2)
BUN SERPL-MCNC: 15 MG/DL (ref 6–24)
BUN/CREAT SERPL: 16 (ref 9–20)
CALCIUM SERPL-MCNC: 9.2 MG/DL (ref 8.7–10.2)
CHLORIDE SERPL-SCNC: 99 MMOL/L (ref 96–106)
CHOLEST SERPL-MCNC: 92 MG/DL (ref 100–199)
CO2 SERPL-SCNC: 28 MMOL/L (ref 18–29)
CREAT SERPL-MCNC: 0.91 MG/DL (ref 0.76–1.27)
ERYTHROCYTE [DISTWIDTH] IN BLOOD BY AUTOMATED COUNT: 13.5 % (ref 12.3–15.4)
EST. AVERAGE GLUCOSE BLD GHB EST-MCNC: 148 MG/DL
GFR SERPLBLD CREATININE-BSD FMLA CKD-EPI: 114 ML/MIN/1.73
GFR SERPLBLD CREATININE-BSD FMLA CKD-EPI: 99 ML/MIN/1.73
GLOBULIN SER CALC-MCNC: 2.5 G/DL (ref 1.5–4.5)
GLUCOSE SERPL-MCNC: 171 MG/DL (ref 65–99)
HBA1C MFR BLD: 6.8 % (ref 4.8–5.6)
HCT VFR BLD AUTO: 45.5 % (ref 37.5–51)
HDLC SERPL-MCNC: 28 MG/DL
HGB BLD-MCNC: 15.3 G/DL (ref 12.6–17.7)
INTERPRETATION, 910389: NORMAL
LDLC SERPL CALC-MCNC: 29 MG/DL (ref 0–99)
Lab: NORMAL
MCH RBC QN AUTO: 30.4 PG (ref 26.6–33)
MCHC RBC AUTO-ENTMCNC: 33.6 G/DL (ref 31.5–35.7)
MCV RBC AUTO: 91 FL (ref 79–97)
PLATELET # BLD AUTO: 196 X10E3/UL (ref 150–379)
POTASSIUM SERPL-SCNC: 4.5 MMOL/L (ref 3.5–5.2)
PROT SERPL-MCNC: 6.9 G/DL (ref 6–8.5)
RBC # BLD AUTO: 5.03 X10E6/UL (ref 4.14–5.8)
SODIUM SERPL-SCNC: 144 MMOL/L (ref 134–144)
T4 FREE SERPL-MCNC: 1.81 NG/DL (ref 0.82–1.77)
TRIGL SERPL-MCNC: 174 MG/DL (ref 0–149)
TSH SERPL DL<=0.005 MIU/L-ACNC: 0.44 UIU/ML (ref 0.45–4.5)
VLDLC SERPL CALC-MCNC: 35 MG/DL (ref 5–40)
WBC # BLD AUTO: 8.1 X10E3/UL (ref 3.4–10.8)

## 2017-11-09 NOTE — PROGRESS NOTES
Labs look much improved than they did 6 months ago. Will discuss these with him when he follows up in 1 month.

## 2017-11-13 DIAGNOSIS — E11.9 TYPE 2 DIABETES MELLITUS WITHOUT COMPLICATION, UNSPECIFIED LONG TERM INSULIN USE STATUS: ICD-10-CM

## 2017-11-13 DIAGNOSIS — F41.8 DEPRESSION WITH ANXIETY: ICD-10-CM

## 2017-11-13 RX ORDER — METFORMIN HYDROCHLORIDE 1000 MG/1
TABLET ORAL
Qty: 180 TAB | Refills: 0 | Status: SHIPPED | OUTPATIENT
Start: 2017-11-13 | End: 2018-02-09 | Stop reason: SDUPTHER

## 2017-11-15 NOTE — TELEPHONE ENCOUNTER
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 Best contact number [phone].

## 2017-11-17 RX ORDER — BUSPIRONE HYDROCHLORIDE 7.5 MG/1
TABLET ORAL
Qty: 60 TAB | Refills: 2 | Status: SHIPPED | OUTPATIENT
Start: 2017-11-17 | End: 2018-02-08 | Stop reason: SDUPTHER

## 2018-02-08 DIAGNOSIS — F41.8 DEPRESSION WITH ANXIETY: ICD-10-CM

## 2018-02-08 RX ORDER — BUPROPION HYDROCHLORIDE 150 MG/1
TABLET, EXTENDED RELEASE ORAL
Qty: 30 TAB | Refills: 2 | Status: SHIPPED | OUTPATIENT
Start: 2018-02-08 | End: 2018-04-24 | Stop reason: ALTCHOICE

## 2018-02-09 DIAGNOSIS — E11.9 TYPE 2 DIABETES MELLITUS WITHOUT COMPLICATION, UNSPECIFIED LONG TERM INSULIN USE STATUS: ICD-10-CM

## 2018-02-09 RX ORDER — BUSPIRONE HYDROCHLORIDE 7.5 MG/1
TABLET ORAL
Qty: 60 TAB | Refills: 2 | Status: SHIPPED | OUTPATIENT
Start: 2018-02-09 | End: 2018-04-24 | Stop reason: ALTCHOICE

## 2018-02-11 RX ORDER — METFORMIN HYDROCHLORIDE 1000 MG/1
TABLET ORAL
Qty: 180 TAB | Refills: 0 | Status: SHIPPED | OUTPATIENT
Start: 2018-02-11 | End: 2018-05-22 | Stop reason: SDUPTHER

## 2018-03-17 DIAGNOSIS — E78.5 HYPERLIPIDEMIA, UNSPECIFIED HYPERLIPIDEMIA TYPE: ICD-10-CM

## 2018-03-19 RX ORDER — ATORVASTATIN CALCIUM 80 MG/1
TABLET, FILM COATED ORAL
Qty: 30 TAB | Refills: 0 | Status: SHIPPED | OUTPATIENT
Start: 2018-03-19 | End: 2018-04-24 | Stop reason: SDUPTHER

## 2018-03-28 RX ORDER — OMEPRAZOLE 40 MG/1
CAPSULE, DELAYED RELEASE ORAL
Qty: 30 CAP | Refills: 0 | Status: SHIPPED | OUTPATIENT
Start: 2018-03-28 | End: 2018-05-22 | Stop reason: SDUPTHER

## 2018-04-16 DIAGNOSIS — R80.9 TYPE 2 DIABETES MELLITUS WITH MICROALBUMINURIA, WITHOUT LONG-TERM CURRENT USE OF INSULIN (HCC): ICD-10-CM

## 2018-04-16 DIAGNOSIS — E78.5 HYPERLIPIDEMIA, UNSPECIFIED HYPERLIPIDEMIA TYPE: ICD-10-CM

## 2018-04-16 DIAGNOSIS — E11.29 TYPE 2 DIABETES MELLITUS WITH MICROALBUMINURIA, WITHOUT LONG-TERM CURRENT USE OF INSULIN (HCC): ICD-10-CM

## 2018-04-17 RX ORDER — ATORVASTATIN CALCIUM 80 MG/1
TABLET, FILM COATED ORAL
Qty: 30 TAB | Refills: 0 | OUTPATIENT
Start: 2018-04-17

## 2018-04-17 NOTE — TELEPHONE ENCOUNTER
Called patient to inform him that an appointment is needed for further refills, however no VM available.

## 2018-04-17 NOTE — TELEPHONE ENCOUNTER
See my note from 3/17. Lipitor was refilled for 30 days at that time. Needed appointment. Has not been seen since this time.

## 2018-04-18 DIAGNOSIS — E78.5 HYPERLIPIDEMIA, UNSPECIFIED HYPERLIPIDEMIA TYPE: ICD-10-CM

## 2018-04-18 RX ORDER — ATORVASTATIN CALCIUM 80 MG/1
TABLET, FILM COATED ORAL
Qty: 30 TAB | Refills: 0 | OUTPATIENT
Start: 2018-04-18

## 2018-04-18 NOTE — TELEPHONE ENCOUNTER
Again, see my notes from 3/17 and 4/17. 7.  Lipitor was refilled for 30 days on 3/17. Needed appointment. Has not been seen since this time.

## 2018-04-23 RX ORDER — GLIPIZIDE 5 MG/1
TABLET, FILM COATED, EXTENDED RELEASE ORAL
Qty: 90 TAB | Refills: 2 | Status: SHIPPED | OUTPATIENT
Start: 2018-04-23 | End: 2018-04-24 | Stop reason: SDUPTHER

## 2018-04-24 ENCOUNTER — OFFICE VISIT (OUTPATIENT)
Dept: FAMILY MEDICINE CLINIC | Age: 48
End: 2018-04-24

## 2018-04-24 VITALS
BODY MASS INDEX: 30.74 KG/M2 | SYSTOLIC BLOOD PRESSURE: 142 MMHG | OXYGEN SATURATION: 96 % | HEIGHT: 71 IN | RESPIRATION RATE: 16 BRPM | TEMPERATURE: 97.8 F | WEIGHT: 219.6 LBS | DIASTOLIC BLOOD PRESSURE: 83 MMHG | HEART RATE: 59 BPM

## 2018-04-24 DIAGNOSIS — M54.50 CHRONIC RIGHT-SIDED LOW BACK PAIN WITHOUT SCIATICA: Primary | ICD-10-CM

## 2018-04-24 DIAGNOSIS — E11.29 TYPE 2 DIABETES MELLITUS WITH MICROALBUMINURIA, WITHOUT LONG-TERM CURRENT USE OF INSULIN (HCC): ICD-10-CM

## 2018-04-24 DIAGNOSIS — I10 ESSENTIAL HYPERTENSION: ICD-10-CM

## 2018-04-24 DIAGNOSIS — G89.29 CHRONIC RIGHT-SIDED LOW BACK PAIN WITHOUT SCIATICA: Primary | ICD-10-CM

## 2018-04-24 DIAGNOSIS — E03.9 ACQUIRED HYPOTHYROIDISM: ICD-10-CM

## 2018-04-24 DIAGNOSIS — F41.9 ANXIETY: ICD-10-CM

## 2018-04-24 DIAGNOSIS — E78.5 HYPERLIPIDEMIA, UNSPECIFIED HYPERLIPIDEMIA TYPE: ICD-10-CM

## 2018-04-24 DIAGNOSIS — R80.9 TYPE 2 DIABETES MELLITUS WITH MICROALBUMINURIA, WITHOUT LONG-TERM CURRENT USE OF INSULIN (HCC): ICD-10-CM

## 2018-04-24 RX ORDER — CYCLOBENZAPRINE HCL 5 MG
5 TABLET ORAL
Qty: 10 TAB | Refills: 0 | Status: SHIPPED | OUTPATIENT
Start: 2018-04-24 | End: 2019-10-25

## 2018-04-24 RX ORDER — LOSARTAN POTASSIUM 25 MG/1
25 TABLET ORAL DAILY
Qty: 90 TAB | Refills: 0 | Status: SHIPPED | OUTPATIENT
Start: 2018-04-24 | End: 2018-05-10 | Stop reason: ALTCHOICE

## 2018-04-24 RX ORDER — ATORVASTATIN CALCIUM 80 MG/1
TABLET, FILM COATED ORAL
Qty: 90 TAB | Refills: 0 | Status: SHIPPED | OUTPATIENT
Start: 2018-04-24 | End: 2018-07-09 | Stop reason: SDUPTHER

## 2018-04-24 RX ORDER — HYDROXYZINE PAMOATE 50 MG/1
50 CAPSULE ORAL
Qty: 30 CAP | Refills: 0 | Status: SHIPPED | OUTPATIENT
Start: 2018-04-24 | End: 2018-05-10 | Stop reason: ALTCHOICE

## 2018-04-24 RX ORDER — SERTRALINE HYDROCHLORIDE 50 MG/1
50 TABLET, FILM COATED ORAL DAILY
Qty: 90 TAB | Refills: 0 | Status: SHIPPED | OUTPATIENT
Start: 2018-04-24 | End: 2018-05-10 | Stop reason: ALTCHOICE

## 2018-04-24 RX ORDER — GLIPIZIDE 5 MG/1
TABLET, FILM COATED, EXTENDED RELEASE ORAL
Qty: 90 TAB | Refills: 0 | Status: SHIPPED | OUTPATIENT
Start: 2018-04-24 | End: 2019-01-30 | Stop reason: SDUPTHER

## 2018-04-24 RX ORDER — HYDROXYZINE PAMOATE 50 MG/1
50 CAPSULE ORAL
Qty: 30 CAP | Refills: 0 | Status: SHIPPED | OUTPATIENT
Start: 2018-04-24 | End: 2018-04-24 | Stop reason: SDUPTHER

## 2018-04-24 NOTE — PROGRESS NOTES
HPI     He is a 50 y.o. male who presents for multiple concerns today. Acute on chronic low back pain: Right lower back, worse when he turns to the right side. Feels dull and achy, present for 2 weeks. Has been taking 3-4 advil daily. 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 Thinks the pain started when he was lifting and moving boxes. No shooting pain down the leg. No change in bowel/bladder movements. No saddle anesthesia. T2DM: last Hba1c was 6.8%. On metformin 1000mg BID and glipizide 5mg daily. Does not monitor blood sugar at home. Last foot exam one year ago. Due for an eye exam.    HTN: on ziac. Not well controlled. Patient also smokes 1/2 ppd    Hypothyroidism: synthroid 300 mcg, last TSH was 0.4    HLD : on lipitor 80mg daily. Depression and anxiety:  PHQ9 = 10 remarkable for decreased interest, feelings of depression, trouble with sleep, poor appetite, feelings of guilt, trouble concentrating and having little energy. No suicidal ideation. JULIANNA 7 = 10 remarkable for anxiety, not being to stop worrying, worrying about different things, restlessness and annoyance. Triggers: works at [employer] which is stressful, takes care of his elderly parents at home, his mother has dementia      Review of Systems   Constitutional: Negative for fatigue and unexpected weight change. HENT: Negative for congestion. Eyes: Negative for visual disturbance. Respiratory: Negative for cough and shortness of breath. Cardiovascular: Negative for chest pain and palpitations. Gastrointestinal: Negative for abdominal pain. Musculoskeletal: Positive for back pain. Skin: Negative for color change. Neurological: Negative for headaches. Psychiatric/Behavioral: Negative for agitation.         Physical Exam:  Visit Vitals    /83 (BP 1 Location: Left arm, BP Patient Position: Sitting)    Pulse (!) 59    Temp 97.8 °F (36.6 °C) (Oral)    Resp 16    Ht 5' 11\" (1.803 m)    Wt 219 lb 9.6 oz (99.6 kg)    SpO2 96%    BMI 30.63 kg/m2     Physical Exam   Constitutional: He is oriented to person, place, and time. He appears well-developed and well-nourished. No distress. HENT:   Head: Normocephalic and atraumatic. Nose: Nose normal.   Mouth/Throat: No oropharyngeal exudate. Eyes: Conjunctivae are normal.   Cardiovascular: Normal rate and regular rhythm. No murmur heard. Pulmonary/Chest: Effort normal and breath sounds normal. He has no wheezes. He has no rales. Abdominal: Soft. Bowel sounds are normal. He exhibits no distension. Musculoskeletal:   Foot Exam: normal monofilament test, good proprioception, normal response to vibration  Sensation by light touch: good   Skin integrity: intact without lesions/ulcers   Vascular: DP and PT pulses 2+  Motor: moves all toes, strength 5/5     Neurological: He is alert and oriented to person, place, and time. Skin: Skin is warm and dry. He is not diaphoretic. Psychiatric: His mood appears anxious. Vitals reviewed. Assessment / Plan   Diagnoses and all orders for this visit:    1. Chronic right-sided low back pain without sciatica  -     cyclobenzaprine (FLEXERIL) 5 mg tablet; Take 1 Tab by mouth nightly. - Ibuprofen PRN  - Home back exercises provided  -  Activity modification at work is key    2. Type 2 diabetes mellitus with microalbuminuria, without long-term current use of insulin (HCC)  -     glipiZIDE SR (GLUCOTROL XL) 5 mg CR tablet; TAKE ONE TABLET BY MOUTH ONCE DAILY  -     HEMOGLOBIN A1C W/O EAG  -      DIABETES FOOT EXAM    3. Essential hypertension - Stop ziac start losartan  -     losartan (COZAAR) 25 mg tablet; Take 1 Tab by mouth daily. 4. Hyperlipidemia, unspecified hyperlipidemia type  -     atorvastatin (LIPITOR) 80 mg tablet; TAKE ONE TABLET BY MOUTH ONCE NIGHTLY      5. Acquired hypothyroidism  -     TSH RFX ON ABNORMAL TO FREE T4    6.  Anxiety - patient stated that wellbutrin and busparis not helping and is willing to try something new. -     sertraline (ZOLOFT) 50 mg tablet; Take 1 Tab by mouth daily. -     hydrOXYzine pamoate (VISTARIL) 50 mg capsule; Take 1 Cap by mouth three (3) times daily as needed for Itching for up to 14 days.  -     BEHAV ASSMT W/SCORE & DOCD/STAND INSTRUMENT      Follow-up Disposition:  Return in about 6 weeks (around 6/5/2018). I have discussed the diagnosis with the patient and the intended plan as seen in the above orders. The patient has received an after-visit summary and questions were answered concerning future plans. I have discussed medication side effects and warnings with the patient as well.     Anita Azul MD  Family Medicine Resident

## 2018-04-24 NOTE — MR AVS SNAPSHOT
[address] 
[phone] Patient: Daylin Anderson. MRN: [id number] CF Visit Information Date & Time Provider Department Dept. Phone Encounter #  
 2018 10:50 AM Rhea Pierce, [facility] [phone] [id number] Upcoming Health Maintenance Date Due  
 EYE EXAM RETINAL OR DILATED Q1 1978 FOOT EXAM Q1 2018 HEMOGLOBIN A1C Q6M 2018 MICROALBUMIN Q1 2018 LIPID PANEL Q1 2018 DTaP/Tdap/Td series (2 - Td) 2024 Allergies as of 2018  Review Complete On: 2018 By: Randy Roa LPN No Known Allergies Current Immunizations  Reviewed on 2015 Name Date Tdap 2014 Not reviewed this visit You Were Diagnosed With   
  
 Codes Comments Anxiety    -  Primary ICD-10-CM: F41.9 ICD-9-CM: 300.00 Type 2 diabetes mellitus with microalbuminuria, without long-term current use of insulin (HCC)     ICD-10-CM: E11.29, R80.9 ICD-9-CM: 250.40, 791.0 Hyperlipidemia, unspecified hyperlipidemia type     ICD-10-CM: E78.5 ICD-9-CM: 272.4 Essential hypertension     ICD-10-CM: I10 
ICD-9-CM: 401.9 Chronic right-sided low back pain without sciatica     ICD-10-CM: M54.5, G89.29 ICD-9-CM: 724.2, 338.29 Acquired hypothyroidism     ICD-10-CM: E03.9 ICD-9-CM: 142. 9 Vitals BP Pulse Temp Resp Height(growth percentile) Weight(growth percentile) 142/83 (BP 1 Location: Left arm, BP Patient Position: Sitting) (!) 59 97.8 °F (36.6 °C) (Oral) 16 5' 11\" (1.803 m) 219 lb 9.6 oz (99.6 kg) SpO2 BMI Smoking Status 96% 30.63 kg/m2 Current Every Day Smoker Vitals History BMI and BSA Data Body Mass Index Body Surface Area  
 30.63 kg/m 2 2.23 m 2 Preferred Pharmacy Pharmacy Name Phone  [address], [address] [facility] [address] [phone] Your Updated Medication List  
  
   
This list is accurate as of 4/24/18 11:00 AM.  Always use your most recent med list.  
  
  
  
  
 alcohol swabs Padm Commonly known as:  ALCOHOL PADS Use once a day when checking blood glucose readings  
  
 atorvastatin 80 mg tablet Commonly known as:  LIPITOR  
TAKE ONE TABLET BY MOUTH ONCE NIGHTLY Blood-Glucose Meter monitoring kit Use once a day  
  
 cyclobenzaprine 5 mg tablet Commonly known as:  FLEXERIL Take 1 Tab by mouth nightly. glipiZIDE SR 5 mg CR tablet Commonly known as:  GLUCOTROL XL  
TAKE ONE TABLET BY MOUTH ONCE DAILY  
  
 glucose blood VI test strips strip Commonly known as:  blood glucose test  
Use once a day with glucometer  
  
 hydrOXYzine pamoate 50 mg capsule Commonly known as:  VISTARIL Take 1 Cap by mouth three (3) times daily as needed for Itching for up to 14 days. Lancets Misc Use once a day with glucometer  
  
 levothyroxine 300 mcg tablet Commonly known as:  SYNTHROID  
TAKE ONE TABLET BY MOUTH ONCE DAILY BEFORE  BREAKFAST  
  
 losartan 25 mg tablet Commonly known as:  COZAAR Take 1 Tab by mouth daily. metFORMIN 1,000 mg tablet Commonly known as:  GLUCOPHAGE  
TAKE ONE TABLET BY MOUTH TWICE DAILY WITH MEALS  
  
 omeprazole 40 mg capsule Commonly known as:  PRILOSEC  
TAKE ONE CAPSULE BY MOUTH ONCE DAILY  
  
 sertraline 50 mg tablet Commonly known as:  ZOLOFT Take 1 Tab by mouth daily. Prescriptions Sent to Pharmacy Refills  
 glipiZIDE SR (GLUCOTROL XL) 5 mg CR tablet 0 Sig: TAKE ONE TABLET BY MOUTH ONCE DAILY Class: Normal  
 Pharmacy: [address] [facility] [facility] Ph #: [phone]  
 atorvastatin (LIPITOR) 80 mg tablet 0 Sig: TAKE ONE TABLET BY MOUTH ONCE NIGHTLY Class: Normal  
 Pharmacy: [address] [facility] [facility] [address] 1700 [facility] Ph #: [phone] cyclobenzaprine (FLEXERIL) 5 mg tablet 0 Sig: Take 1 Tab by mouth nightly. Class: Normal  
 Pharmacy: [facility] [address], [address] [place] [address] [address] [place] [address] Ph #: [phone] Route: Oral  
 losartan (COZAAR) 25 mg tablet 0 Sig: Take 1 Tab by mouth daily. Class: Normal  
 Pharmacy: [facility] [address], [address] [place] [address] Rd. [address] [place] [address] Ph #: [phone] Route: Oral  
 sertraline (ZOLOFT) 50 mg tablet 0 Sig: Take 1 Tab by mouth daily. Class: Normal  
 Pharmacy: [facility] [address], [address] [place] [address] [address]. [address] [place] [address] Ph #: [phone] Route: Oral  
 hydrOXYzine pamoate (VISTARIL) 50 mg capsule 0 Sig: Take 1 Cap by mouth three (3) times daily as needed for Itching for up to 14 days. Class: Normal  
 Pharmacy: [facility] [address], [address] [place] [address] Rd. [address] [place] [address] Ph #: [phone] Route: Oral  
  
We Performed the Following BEHAV ASSMT W/SCORE & DOCD/STAND INSTRUMENT [id number] CPT(R)] HEMOGLOBIN A1C W/O EAG [60676 CPT(R)]  DIABETES FOOT EXAM [HM7 Custom] TSH RFX ON ABNORMAL TO FREE T4 [[id number] Custom] Patient Instructions Anxiety Disorder: Care Instructions Your Care Instructions Anxiety is a normal reaction to stress. Difficult situations can cause you to have symptoms such as sweaty palms and a nervous feeling. In an anxiety disorder, the symptoms are far more severe. Constant worry, muscle tension, trouble sleeping, nausea and diarrhea, and other symptoms can make normal daily activities difficult or impossible. These symptoms may occur for no reason, and they can affect your work, school, or social life. Medicines, counseling, and self-care can all help. Follow-up care is a key part of your treatment and safety. Be sure to make and go to all appointments, and call your doctor if you are having problems.  It's also a good idea to know your test results and keep a list of the medicines you take. How can you care for yourself at home? · Take medicines exactly as directed. Call your doctor if you think you are having a problem with your medicine. · Go to your counseling sessions and follow-up appointments. · Recognize and accept your anxiety. Then, when you are in a situation that makes you anxious, say to yourself, \"This is not an emergency. I feel uncomfortable, but I am not in danger. I can keep going even if I feel anxious. \" · Be kind to your body: ¨ Relieve tension with exercise or a massage. ¨ Get enough rest. 
¨ Avoid alcohol, caffeine, nicotine, and illegal drugs. They can increase your anxiety level and cause sleep problems. ¨ Learn and do relaxation techniques. See below for more about these techniques. · Engage your mind. Get out and do something you enjoy. Go to a funny movie, or take a walk or hike. Plan your day. Having too much or too little to do can make you anxious. · Keep a record of your symptoms. Discuss your fears with a good friend or family member, or join a support group for people with similar problems. Talking to others sometimes relieves stress. · Get involved in social groups, or volunteer to help others. Being alone sometimes makes things seem worse than they are. · Get at least 30 minutes of exercise on most days of the week to relieve stress. Walking is a good choice. You also may want to do other activities, such as running, swimming, cycling, or playing tennis or team sports. Relaxation techniques Do relaxation exercises 10 to 20 minutes a day. You can play soothing, relaxing music while you do them, if you wish. · Tell others in your house that you are going to do your relaxation exercises. Ask them not to disturb you. · Find a comfortable place, away from all distractions and noise. · Lie down on your back, or sit with your back straight. · Focus on your breathing. Make it slow and steady. · Breathe in through your nose. Breathe out through either your nose or mouth. · Breathe deeply, filling up the area between your navel and your rib cage. Breathe so that your belly goes up and down. · Do not hold your breath. · Breathe like this for 5 to 10 minutes. Notice the feeling of calmness throughout your whole body. As you continue to breathe slowly and deeply, relax by doing the following for another 5 to 10 minutes: · Tighten and relax each muscle group in your body. You can begin at your toes and work your way up to your head. · Imagine your muscle groups relaxing and becoming heavy. · Empty your mind of all thoughts. · Let yourself relax more and more deeply. · Become aware of the state of calmness that surrounds you. · When your relaxation time is over, you can bring yourself back to alertness by moving your fingers and toes and then your hands and feet and then stretching and moving your entire body. Sometimes people fall asleep during relaxation, but they usually wake up shortly afterward. · Always give yourself time to return to full alertness before you drive a car or do anything that might cause an accident if you are not fully alert. Never play a relaxation tape while you drive a car. When should you call for help? Call 911 anytime you think you may need emergency care. For example, call if: 
? · You feel you cannot stop from hurting yourself or someone else. ? Keep the numbers for these national suicide hotlines: 3-482-136-TALK (3-607.347.6120) and 0-945-GRHXSQO (0-886.732.6007). If you or someone you know talks about suicide or feeling hopeless, get help right away. ? Watch closely for changes in your health, and be sure to contact your doctor if: 
? · You have anxiety or fear that affects your life. ? · You have symptoms of anxiety that are new or different from those you had before. Where can you learn more? Go to http://cathleen-filipe.info/. Enter P754 in the search box to learn more about \"Anxiety Disorder: Care Instructions. \" Current as of: May 12, 2017 Content Version: 11.4 © 3118-8273 [employer]. Care instructions adapted under license by [employer] (which disclaims liability or warranty for this information). If you have questions about a medical condition or this instruction, always ask your healthcare professional. [address] any warranty or liability for your use of this information. Back Stretches: Exercises Your Care Instructions Here are some examples of exercises for stretching your back. Start each exercise slowly. Ease off the exercise if you start to have pain. Your doctor or physical therapist will tell you when you can start these exercises and which ones will work best for you. How to do the exercises Overhead stretch 1. Stand comfortably with your feet shoulder-width apart. 2. Looking straight ahead, raise both arms over your head and reach toward the ceiling. Do not allow your head to tilt back. 3. Hold for 15 to 30 seconds, then lower your arms to your sides. 4. Repeat 2 to 4 times. Side stretch 1. Stand comfortably with your feet shoulder-width apart. 2. Raise one arm over your head, and then lean to the other side. 3. Slide your hand down your leg as you let the weight of your arm gently stretch your side muscles. Hold for 15 to 30 seconds. 4. Repeat 2 to 4 times on each side. Press-up 1. Lie on your stomach, supporting your body with your forearms. 2. Press your elbows down into the floor to raise your upper back. As you do this, relax your stomach muscles and allow your back to arch without using your back muscles. As your press up, do not let your hips or pelvis come off the floor. 3. Hold for 15 to 30 seconds, then relax. 4. Repeat 2 to 4 times. Relax and rest 
 
1.  Lie on your back with a rolled towel under your neck and a pillow under your knees. Extend your arms comfortably to your sides. 2. Relax and breathe normally. 3. Remain in this position for about 10 minutes. 4. If you can, do this 2 or 3 times each day. Follow-up care is a key part of your treatment and safety. Be sure to make and go to all appointments, and call your doctor if you are having problems. It's also a good idea to know your test results and keep a list of the medicines you take. Where can you learn more? Go to http://cathleen-filipe.info/. Enter Z555 in the search box to learn more about \"Back Stretches: Exercises. \" Current as of: March 21, 2017 Content Version: 11.4 © 1491-9362 [employer]. Care instructions adapted under license by [employer] (which disclaims liability or warranty for this information). If you have questions about a medical condition or this instruction, always ask your healthcare professional. [place] [address] any warranty or liability for your use of this information. Introducing [facility]! Dear David Bundy: 
Thank you for requesting a "[employer] account. Our records indicate that you already have an active "[employer] account. You can access your account anytime at [email or web address]. Did you know that you can access your hospital and ER discharge instructions at any time in "[employer]? You can also review all of your test results from your hospital stay or ER visit. Additional Information If you have questions, please visit the Frequently Asked Questions section of the "[employer] website at [email or web address]. Remember, "[employer] is NOT to be used for urgent needs. For medical emergencies, dial 911. Now available from your iPhone and Android! Please provide this summary of care documentation to your next provider. Your primary care clinician is listed as Samina Friend.  If you have any questions after today's visit, please call [phone].

## 2018-04-24 NOTE — PATIENT INSTRUCTIONS
Anxiety Disorder: Care Instructions  Your Care Instructions    Anxiety is a normal reaction to stress. Difficult situations can cause you to have symptoms such as sweaty palms and a nervous feeling. In an anxiety disorder, the symptoms are far more severe. Constant worry, muscle tension, trouble sleeping, nausea and diarrhea, and other symptoms can make normal daily activities difficult or impossible. These symptoms may occur for no reason, and they can affect your work, school, or social life. Medicines, counseling, and self-care can all help. Follow-up care is a key part of your treatment and safety. Be sure to make and go to all appointments, and call your doctor if you are having problems. It's also a good idea to know your test results and keep a list of the medicines you take. How can you care for yourself at home? · Take medicines exactly as directed. Call your doctor if you think you are having a problem with your medicine. · Go to your counseling sessions and follow-up appointments. · Recognize and accept your anxiety. Then, when you are in a situation that makes you anxious, say to yourself, \"This is not an emergency. I feel uncomfortable, but I am not in danger. I can keep going even if I feel anxious. \"  · Be kind to your body:  ¨ Relieve tension with exercise or a massage. ¨ Get enough rest.  ¨ Avoid alcohol, caffeine, nicotine, and illegal drugs. They can increase your anxiety level and cause sleep problems. ¨ Learn and do relaxation techniques. See below for more about these techniques. · Engage your mind. Get out and do something you enjoy. Go to a funny movie, or take a walk or hike. Plan your day. Having too much or too little to do can make you anxious. · Keep a record of your symptoms. Discuss your fears with a good friend or family member, or join a support group for people with similar problems. Talking to others sometimes relieves stress.   · Get involved in social groups, or volunteer to help others. Being alone sometimes makes things seem worse than they are. · Get at least 30 minutes of exercise on most days of the week to relieve stress. Walking is a good choice. You also may want to do other activities, such as running, swimming, cycling, or playing tennis or team sports. Relaxation techniques  Do relaxation exercises 10 to 20 minutes a day. You can play soothing, relaxing music while you do them, if you wish. · Tell others in your house that you are going to do your relaxation exercises. Ask them not to disturb you. · Find a comfortable place, away from all distractions and noise. · Lie down on your back, or sit with your back straight. · Focus on your breathing. Make it slow and steady. · Breathe in through your nose. Breathe out through either your nose or mouth. · Breathe deeply, filling up the area between your navel and your rib cage. Breathe so that your belly goes up and down. · Do not hold your breath. · Breathe like this for 5 to 10 minutes. Notice the feeling of calmness throughout your whole body. As you continue to breathe slowly and deeply, relax by doing the following for another 5 to 10 minutes:  · Tighten and relax each muscle group in your body. You can begin at your toes and work your way up to your head. · Imagine your muscle groups relaxing and becoming heavy. · Empty your mind of all thoughts. · Let yourself relax more and more deeply. · Become aware of the state of calmness that surrounds you. · When your relaxation time is over, you can bring yourself back to alertness by moving your fingers and toes and then your hands and feet and then stretching and moving your entire body. Sometimes people fall asleep during relaxation, but they usually wake up shortly afterward. · Always give yourself time to return to full alertness before you drive a car or do anything that might cause an accident if you are not fully alert.  Never play a relaxation tape while you drive a car. When should you call for help? Call 911 anytime you think you may need emergency care. For example, call if:  ? · You feel you cannot stop from hurting yourself or someone else. ? Keep the numbers for these national suicide hotlines: 8-114-012-TALK (4-385.778.2344) and 9-301-ORWIRFU (2-273.533.2976). If you or someone you know talks about suicide or feeling hopeless, get help right away. ? Watch closely for changes in your health, and be sure to contact your doctor if:  ? · You have anxiety or fear that affects your life. ? · You have symptoms of anxiety that are new or different from those you had before. Where can you learn more? Go to http://cathleenTintrifilipe.info/. Enter P754 in the search box to learn more about \"Anxiety Disorder: Care Instructions. \"  Current as of: May 12, 2017  Content Version: 11.4  © 4189-6825 [employer]. Care instructions adapted under license by [facility] (which disclaims liability or warranty for this information). If you have questions about a medical condition or this instruction, always ask your healthcare professional. [place] [address] any warranty or liability for your use of this information. Back Stretches: Exercises  Your Care Instructions  Here are some examples of exercises for stretching your back. Start each exercise slowly. Ease off the exercise if you start to have pain. Your doctor or physical therapist will tell you when you can start these exercises and which ones will work best for you. How to do the exercises  Overhead stretch    1. Stand comfortably with your feet shoulder-width apart. 2. Looking straight ahead, raise both arms over your head and reach toward the ceiling. Do not allow your head to tilt back. 3. Hold for 15 to 30 seconds, then lower your arms to your sides. 4. Repeat 2 to 4 times.   Side stretch    1. Stand comfortably with your feet shoulder-width apart.  2. Raise one arm over your head, and then lean to the other side. 3. Slide your hand down your leg as you let the weight of your arm gently stretch your side muscles. Hold for 15 to 30 seconds. 4. Repeat 2 to 4 times on each side. Press-up    1. Lie on your stomach, supporting your body with your forearms. 2. Press your elbows down into the floor to raise your upper back. As you do this, relax your stomach muscles and allow your back to arch without using your back muscles. As your press up, do not let your hips or pelvis come off the floor. 3. Hold for 15 to 30 seconds, then relax. 4. Repeat 2 to 4 times. Relax and rest    1. Lie on your back with a rolled towel under your neck and a pillow under your knees. Extend your arms comfortably to your sides. 2. Relax and breathe normally. 3. Remain in this position for about 10 minutes. 4. If you can, do this 2 or 3 times each day. Follow-up care is a key part of your treatment and safety. Be sure to make and go to all appointments, and call your doctor if you are having problems. It's also a good idea to know your test results and keep a list of the medicines you take. Where can you learn more? Go to http://cathleen-filipe.info/. Enter Q645 in the search box to learn more about \"Back Stretches: Exercises. \"  Current as of: March 21, 2017  Content Version: 11.4  © 1738-6338 [employer]. Care instructions adapted under license by [employer] (which disclaims liability or warranty for this information). If you have questions about a medical condition or this instruction, always ask your healthcare professional. [place] [address] any warranty or liability for your use of this information.

## 2018-04-24 NOTE — PROGRESS NOTES
Chief Complaint   Patient presents with    Medication Refill    LOW BACK PAIN     back pain from working. States Pain begin at mid back and radiates to lower back. Pain is worse upon movement       Visit Vitals    /83 (BP 1 Location: Left arm, BP Patient Position: Sitting)    Pulse (!) 59    Temp 97.8 °F (36.6 °C) (Oral)    Resp 16    Ht 5' 11\" (1.803 m)    Wt 219 lb 9.6 oz (99.6 kg)    SpO2 96%    BMI 30.63 kg/m2       1. Have you been to the ER, urgent care clinic since your last visit? Hospitalized since your last visit? No    2. Have you seen or consulted any other health care providers outside of the [address] [place] [address] since your last visit? Include any pap smears or colon screening.  Yes When: a few weeks ago  [address] [place] [address] of Physician's Name

## 2018-04-25 ENCOUNTER — TELEPHONE (OUTPATIENT)
Dept: FAMILY MEDICINE CLINIC | Age: 48
End: 2018-04-25

## 2018-04-25 LAB
HBA1C MFR BLD: 6.7 % (ref 4.8–5.6)
TSH SERPL DL<=0.005 MIU/L-ACNC: 0.75 UIU/ML (ref 0.45–4.5)

## 2018-05-09 ENCOUNTER — TELEPHONE (OUTPATIENT)
Dept: FAMILY MEDICINE CLINIC | Age: 48
End: 2018-05-09

## 2018-05-09 DIAGNOSIS — I10 ESSENTIAL HYPERTENSION: ICD-10-CM

## 2018-05-09 RX ORDER — BISOPROLOL FUMARATE AND HYDROCHLOROTHIAZIDE 5; 6.25 MG/1; MG/1
TABLET ORAL
Qty: 90 TAB | Refills: 1 | OUTPATIENT
Start: 2018-05-09

## 2018-05-09 NOTE — TELEPHONE ENCOUNTER
Per patient call, he have stopped medications and will not be taking them anymore. Medications causing him to be drowsy at work and making him sick. Patient states the medications are hydroxyzine,losartan,sertraline.       Patient states he wants to go back on Wellbutrin and bisoprolol-hydrochlorothiazide

## 2018-05-10 DIAGNOSIS — I10 ESSENTIAL HYPERTENSION: ICD-10-CM

## 2018-05-10 DIAGNOSIS — F41.8 DEPRESSION WITH ANXIETY: ICD-10-CM

## 2018-05-10 RX ORDER — BISOPROLOL FUMARATE AND HYDROCHLOROTHIAZIDE 5; 6.25 MG/1; MG/1
TABLET ORAL
Qty: 90 TAB | Refills: 0 | Status: SHIPPED | OUTPATIENT
Start: 2018-05-10 | End: 2018-07-09 | Stop reason: SDUPTHER

## 2018-05-10 RX ORDER — BUPROPION HYDROCHLORIDE 150 MG/1
TABLET, EXTENDED RELEASE ORAL
Qty: 90 TAB | Refills: 0 | Status: SHIPPED | OUTPATIENT
Start: 2018-05-10 | End: 2018-09-01 | Stop reason: SDUPTHER

## 2018-05-10 NOTE — PROGRESS NOTES
Patient preferred to resume wellbutrin and bisoprolol-hctz. States he felt sick and drowsy with zoloft, also thinks the losartan is making him feel sick.

## 2018-05-23 DIAGNOSIS — M54.50 CHRONIC RIGHT-SIDED LOW BACK PAIN WITHOUT SCIATICA: ICD-10-CM

## 2018-05-23 DIAGNOSIS — G89.29 CHRONIC RIGHT-SIDED LOW BACK PAIN WITHOUT SCIATICA: ICD-10-CM

## 2018-05-23 RX ORDER — CYCLOBENZAPRINE HCL 5 MG
5 TABLET ORAL
Qty: 10 TAB | Refills: 0 | OUTPATIENT
Start: 2018-05-23

## 2018-05-23 NOTE — TELEPHONE ENCOUNTER
TY---[phone]    Patient called for this refill for back pain. He asked about the glypizide medication and was informed it was ordered on 4/24.

## 2018-05-29 DIAGNOSIS — F41.9 ANXIETY: ICD-10-CM

## 2018-05-31 RX ORDER — HYDROXYZINE PAMOATE 50 MG/1
CAPSULE ORAL
Qty: 30 CAP | Refills: 0 | Status: SHIPPED | OUTPATIENT
Start: 2018-05-31 | End: 2018-07-10 | Stop reason: SDUPTHER

## 2018-07-09 DIAGNOSIS — K21.9 GASTROESOPHAGEAL REFLUX DISEASE WITHOUT ESOPHAGITIS: ICD-10-CM

## 2018-07-09 DIAGNOSIS — E78.5 HYPERLIPIDEMIA, UNSPECIFIED HYPERLIPIDEMIA TYPE: ICD-10-CM

## 2018-07-09 DIAGNOSIS — I10 ESSENTIAL HYPERTENSION: ICD-10-CM

## 2018-07-09 RX ORDER — OMEPRAZOLE 40 MG/1
CAPSULE, DELAYED RELEASE ORAL
Qty: 30 CAP | Refills: 0 | Status: SHIPPED | OUTPATIENT
Start: 2018-07-09 | End: 2018-07-26 | Stop reason: SDUPTHER

## 2018-07-09 RX ORDER — ATORVASTATIN CALCIUM 80 MG/1
TABLET, FILM COATED ORAL
Qty: 90 TAB | Refills: 0 | Status: SHIPPED | OUTPATIENT
Start: 2018-07-09 | End: 2018-10-25 | Stop reason: SDUPTHER

## 2018-07-09 RX ORDER — BISOPROLOL FUMARATE AND HYDROCHLOROTHIAZIDE 5; 6.25 MG/1; MG/1
TABLET ORAL
Qty: 90 TAB | Refills: 0 | Status: SHIPPED | OUTPATIENT
Start: 2018-07-09 | End: 2018-11-13 | Stop reason: SDUPTHER

## 2018-07-09 NOTE — TELEPHONE ENCOUNTER
Patient would like a call regarding issue with medications.   Requested Prescriptions     Pending Prescriptions Disp Refills    bisoprolol-hydroCHLOROthiazide (ZIAC) 5-6.25 mg per tablet [Pharmacy Med Name: BISOPROLOL/HCT 5-6.25MG TAB] 90 Tab 0     Sig: TAKE 1 TABLET BY MOUTH ONCE DAILY    atorvastatin (LIPITOR) 80 mg tablet [Pharmacy Med Name: ATORVASTATIN 80MG   TAB] 90 Tab 0     Sig: TAKE 1 TABLET BY MOUTH NIGHTLY      Thank you

## 2018-07-10 DIAGNOSIS — F41.9 ANXIETY: ICD-10-CM

## 2018-07-10 RX ORDER — HYDROXYZINE PAMOATE 50 MG/1
CAPSULE ORAL
Qty: 30 CAP | Refills: 0 | Status: SHIPPED | OUTPATIENT
Start: 2018-07-10 | End: 2018-07-27 | Stop reason: SDUPTHER

## 2018-07-20 DIAGNOSIS — F41.8 DEPRESSION WITH ANXIETY: ICD-10-CM

## 2018-07-26 DIAGNOSIS — F41.9 ANXIETY: ICD-10-CM

## 2018-07-26 DIAGNOSIS — K21.9 GASTROESOPHAGEAL REFLUX DISEASE WITHOUT ESOPHAGITIS: ICD-10-CM

## 2018-07-26 NOTE — TELEPHONE ENCOUNTER
This patient needs to come in for follow up for refill for wellbutrin. Please call and offer him an appt.  Thanks

## 2018-07-27 RX ORDER — HYDROXYZINE PAMOATE 50 MG/1
CAPSULE ORAL
Qty: 30 CAP | Refills: 0 | Status: SHIPPED | COMMUNITY
Start: 2018-07-27 | End: 2018-08-31 | Stop reason: SDUPTHER

## 2018-07-29 RX ORDER — OMEPRAZOLE 40 MG/1
CAPSULE, DELAYED RELEASE ORAL
Qty: 30 CAP | Refills: 0 | Status: SHIPPED | OUTPATIENT
Start: 2018-07-29 | End: 2019-02-07 | Stop reason: SDUPTHER

## 2018-07-30 RX ORDER — BUPROPION HYDROCHLORIDE 150 MG/1
TABLET, EXTENDED RELEASE ORAL
Qty: 90 TAB | Refills: 0 | OUTPATIENT
Start: 2018-07-30

## 2018-07-30 NOTE — TELEPHONE ENCOUNTER
Wellbutrin stopped in 4/2018 (See Dr. Desean Alvarez note). Also was supposed to follow up ~6/5, and does not look like he did.

## 2018-08-31 DIAGNOSIS — F41.9 ANXIETY: ICD-10-CM

## 2018-09-01 DIAGNOSIS — F41.8 DEPRESSION WITH ANXIETY: ICD-10-CM

## 2018-09-01 RX ORDER — BUPROPION HYDROCHLORIDE 150 MG/1
TABLET, EXTENDED RELEASE ORAL
Qty: 90 TAB | Refills: 0 | Status: SHIPPED | OUTPATIENT
Start: 2018-09-01 | End: 2018-10-19 | Stop reason: SDUPTHER

## 2018-09-03 RX ORDER — HYDROXYZINE PAMOATE 50 MG/1
CAPSULE ORAL
Qty: 30 CAP | Refills: 0 | Status: SHIPPED | OUTPATIENT
Start: 2018-09-03 | End: 2018-10-19 | Stop reason: SDUPTHER

## 2018-10-15 ENCOUNTER — TELEPHONE (OUTPATIENT)
Dept: FAMILY MEDICINE CLINIC | Age: 48
End: 2018-10-15

## 2018-10-15 NOTE — TELEPHONE ENCOUNTER
Patient is asking for a emergency supply to hold him until appt of 10/19/18 with you.    busPIRone (BUSPAR) 7.5 mg tablet   Hydroxyzine pamoate    Patient out of medication and asking this be addressed asap

## 2018-10-15 NOTE — TELEPHONE ENCOUNTER
Patient calling back requesting some of this med until his appt. On the 19th. Wanted to relay message to you. I know that you said there were changes made to the medication. Not sure if you are able to address before his appt. Just let me know how to proceed.

## 2018-10-18 DIAGNOSIS — E03.4 HYPOTHYROIDISM DUE TO ACQUIRED ATROPHY OF THYROID: ICD-10-CM

## 2018-10-18 RX ORDER — LEVOTHYROXINE SODIUM 300 UG/1
TABLET ORAL
Qty: 30 TAB | Refills: 2 | Status: SHIPPED | OUTPATIENT
Start: 2018-10-18 | End: 2019-04-01 | Stop reason: SDUPTHER

## 2018-10-19 ENCOUNTER — OFFICE VISIT (OUTPATIENT)
Dept: FAMILY MEDICINE CLINIC | Age: 48
End: 2018-10-19

## 2018-10-19 VITALS
TEMPERATURE: 97.5 F | SYSTOLIC BLOOD PRESSURE: 137 MMHG | OXYGEN SATURATION: 96 % | WEIGHT: 222 LBS | HEIGHT: 71 IN | BODY MASS INDEX: 31.08 KG/M2 | DIASTOLIC BLOOD PRESSURE: 78 MMHG | RESPIRATION RATE: 16 BRPM | HEART RATE: 61 BPM

## 2018-10-19 DIAGNOSIS — E03.4 HYPOTHYROIDISM DUE TO ACQUIRED ATROPHY OF THYROID: ICD-10-CM

## 2018-10-19 DIAGNOSIS — F41.8 DEPRESSION WITH ANXIETY: ICD-10-CM

## 2018-10-19 DIAGNOSIS — F41.9 ANXIETY: Primary | ICD-10-CM

## 2018-10-19 DIAGNOSIS — E11.9 TYPE 2 DIABETES MELLITUS WITHOUT COMPLICATION, WITHOUT LONG-TERM CURRENT USE OF INSULIN (HCC): ICD-10-CM

## 2018-10-19 DIAGNOSIS — Z28.21 REFUSED INFLUENZA VACCINE: ICD-10-CM

## 2018-10-19 RX ORDER — BUSPIRONE HYDROCHLORIDE 7.5 MG/1
7.5 TABLET ORAL 2 TIMES DAILY
Qty: 180 TAB | Refills: 0 | Status: SHIPPED | OUTPATIENT
Start: 2018-10-19 | End: 2019-01-04 | Stop reason: SDUPTHER

## 2018-10-19 RX ORDER — BUPROPION HYDROCHLORIDE 150 MG/1
TABLET, EXTENDED RELEASE ORAL
Qty: 90 TAB | Refills: 0 | Status: SHIPPED | OUTPATIENT
Start: 2018-10-19 | End: 2019-02-16 | Stop reason: SDUPTHER

## 2018-10-19 RX ORDER — HYDROXYZINE PAMOATE 50 MG/1
50 CAPSULE ORAL
Qty: 180 CAP | Refills: 0 | Status: SHIPPED | OUTPATIENT
Start: 2018-10-19 | End: 2019-04-18 | Stop reason: SDUPTHER

## 2018-10-19 NOTE — PROGRESS NOTES
46year old male here for routine care    Hx anxiety, hypothyroidism, diabetes (last Hgb A1C = 6.7)    Lives with mother who has severe dementia   Her father is an alcoholic who makes it more difficult    I reviewed with the resident the medical history and the resident's findings on the physical examination. I discussed with the resident the patient's diagnosis and concur with the plan.

## 2018-10-19 NOTE — PATIENT INSTRUCTIONS
Learning About Generalized Anxiety Disorder  What is generalized anxiety disorder? We all worry. It's a normal part of life. But when you have generalized anxiety disorder, you worry about lots of things and have a hard time stopping your worry. This worry or anxiety interferes with your relationships, work, and life. What causes it? The cause is not known. But it may be passed down through families. What are the symptoms? You may feel anxious or worry most days about things like work, relationships, health, or money. You may worry about things that are unlikely to happen. You find it hard to stop or control the worry. Because you worry a lot and try hard to stop worrying, you may feel restless, tired, tense, or cranky. You may also find it hard to think or sleep. And you may have headaches or an upset stomach. How is it diagnosed? Your doctor will ask about your health and how often you worry or feel anxious. He or she may ask about other symptoms, like whether you:  · Feel restless. · Feel tired. · Have a hard time thinking or feel that your mind goes blank. · Feel cranky. · Have tense muscles. · Have sleep problems. A physical exam and tests can help make sure that your symptoms aren't caused by a different condition, such as a thyroid problem. How is it treated? Counseling and medicine can both work to treat anxiety. The two are often used along with lifestyle changes. Cognitive-behavioral therapy (CBT) is a type of counseling that's used to help treat anxiety. In CBT, you learn how to notice and replace thoughts that make you feel worried. It also can help you learn how to relax when you worry. Medicines can help. These medicines are often also used for depression. Selective serotonin reuptake inhibitors (SSRIs) are often tried first. But there are other medicines that your doctor may use. You may need to try a few medicines to find one that works well.   Many people feel better by getting regular exercise, eating healthy meals, and getting good sleep. Mindfulness--focusing on things that happen in the present moment--also can help reduce your anxiety. What can you expect when you have it? Having anxiety can be upsetting. Some people might feel less worried and stressed after a couple of months of treatment. But for other people, it might take longer to feel better. Reaching out to people for help is important. Try not to isolate yourself. Let your family and friends help you. Find someone you can trust and confide in. Talk to that person. When you know what anxiety is--and how you can get help for it--you can start to learn new ways of thinking. This can help you cope and work through your anxiety. Follow-up care is a key part of your treatment and safety. Be sure to make and go to all appointments, and call your doctor if you are having problems. It's also a good idea to know your test results and keep a list of the medicines you take. Where can you learn more? Go to http://cathleen-filipe.info/. Enter G110 in the search box to learn more about \"Learning About Generalized Anxiety Disorder. \"  Current as of: April 14, 2018  Content Version: 11.8  © 6621-1628 Healthwise, Incorporated. Care instructions adapted under license by [employer] (which disclaims liability or warranty for this information). If you have questions about a medical condition or this instruction, always ask your healthcare professional. [address] any warranty or liability for your use of this information.

## 2018-10-19 NOTE — PROGRESS NOTES
Subjective: Antwan Farah. is an 46 y.o. male who presents for  Follow up visit on chronic medical conditions      HPI  Chief Complaint   Patient presents with    Medication Refill     1. Diabetes  DIABETIC CARE CHECKLIST   1. Patient on ASA - NO  2. Patient on Statin- Yes  3. Patient on ACE- No  4. Diet- Not following diabetic diet, eats plenty of fast food, koehler not eat salads, drinks plenty of orange juice every days ( 1 bottle of 1L)  5. Exercise -Not exercising  6. Blood pressure today - 137/78  7. Last eye check - Does not remember, due for one  8. Last cholesterol check - 11/2017  9 . Last HbA1c - 6.7 ( 4/2018)  10. Last urine microalbulmin- 11/2017, due today  11. Last comprehensive foot exam -  11/2017  12. Did patient receive pneumococcal vaccine - Has not received, declined today  13. Does the patient have a nephrologist- No  14. Does the patient have a cardiologist-No  15. Seasonal influenza vaccine - Decline today      2. Hypothyroidism  Stable. Takes synthroid 300mg daily. Doesn't miss doses. N  No fatigue, constipation, brain fog. No dry hair, dry skin. No cold intolerance.     3. HLD:  Taking lipitor. Good compliance. No concerns.     4. Depression with anxiety  He has been dealing with depression and anxiety for a long time. The symptoms are currently controlled with current regimen. Some days he has increased agitation because is overwhelmed with the care of his 67 yo mother who has severe dementia. He also takes care of his father who is an alcoholic. The patient reports that even the home situation is overwhelming he knows that he needs to care of his parents and would continue the current care.    Sleep: Good  Anhedonia: No  Guilt (worthless): No  Energy: Variable  Cognition/C oncentration: Good  Appetite: Normal.  Psychomotor retardation: None  SI/HI: No    Exercise: Not execising  Support:No support, lives with his parents and takes care of them  Current medication: Wellbutrin, Buspar and Vistaril  Compliance with medication: Yes  Side effects from the medication: None  Previous medications tried: Zoloft, has not tolerated any SSRIs due to multiple side effects  Counseling: None  Psychiatrist: Never seen        Allergies - reviewed:   No Known Allergies      Medications - reviewed:  Current Outpatient Medications   Medication Sig    buPROPion SR (WELLBUTRIN SR) 150 mg SR tablet TAKE ONE TABLET BY MOUTH ONCE DAILY IN THE MORNING    hydrOXYzine pamoate (VISTARIL) 50 mg capsule Take 1 Cap by mouth three (3) times daily as needed for Anxiety.  busPIRone (BUSPAR) 7.5 mg tablet Take 1 Tab by mouth two (2) times a day.  levothyroxine (SYNTHROID) 300 mcg tablet TAKE 1 TABLET BY MOUTH ONCE DAILY BEFORE BREAKFAST    metFORMIN (GLUCOPHAGE) 1,000 mg tablet TAKE 1 TABLET BY MOUTH TWICE DAILY WITH MEALS.  omeprazole (PRILOSEC) 40 mg capsule TAKE 1 CAPSULE BY MOUTH ONCE DAILY    bisoprolol-hydroCHLOROthiazide (ZIAC) 5-6.25 mg per tablet TAKE 1 TABLET BY MOUTH ONCE DAILY    atorvastatin (LIPITOR) 80 mg tablet TAKE 1 TABLET BY MOUTH NIGHTLY    glipiZIDE SR (GLUCOTROL XL) 5 mg CR tablet TAKE ONE TABLET BY MOUTH ONCE DAILY    cyclobenzaprine (FLEXERIL) 5 mg tablet Take 1 Tab by mouth nightly.  Blood-Glucose Meter monitoring kit Use once a day    Lancets misc Use once a day with glucometer    glucose blood VI test strips (BLOOD GLUCOSE TEST) strip Use once a day with glucometer    alcohol swabs (ALCOHOL PADS) padm Use once a day when checking blood glucose readings     No current facility-administered medications for this visit.           Past Medical History - reviewed:  Past Medical History:   Diagnosis Date    Anxiety     Chest pain     Diverticulosis     Hypothyroid 7/20/2010    Kidney stone 7/20/2010           Family History - reviewed:  Family History   Problem Relation Age of Onset    Hypertension Mother     Heart Disease Mother         valve placement     Hypertension Father     Asthma Father          Social History - reviewed:  Social History     Socioeconomic History    Marital status: SINGLE     Spouse name: Not on file    Number of children: Not on file    Years of education: Not on file    Highest education level: Not on file   Social Needs    Financial resource strain: Not on file    Food insecurity - worry: Not on file    Food insecurity - inability: Not on file   [employer] needs - medical: Not on file   [employer] needs - non-medical: Not on file   Occupational History    Not on file   Tobacco Use    Smoking status: Current Every Day Smoker     Packs/day: 0.25     Years: 20.00     Pack years: 5.00     Types: Cigarettes    Smokeless tobacco: Former User     Types: Snuff   Substance and Sexual Activity    Alcohol use: No     Comment: occasionally once a month    Drug use: No     Comment: 1/2 pack a day    Sexual activity: Yes     Partners: Female   Other Topics Concern    Not on file   Social History Narrative    Not on file         Review of Systems   COMPREHENSIVE ros IS NEGATIVE EXCEPT WRITTEN IN H&P. Objective:     Visit Vitals  /78 (BP 1 Location: Left arm, BP Patient Position: Sitting)   Pulse 61   Temp 97.5 °F (36.4 °C) (Oral)   Resp 16   Ht 5' 11\" (1.803 m)   Wt 222 lb (100.7 kg)   SpO2 96%   BMI 30.96 kg/m²       General appearance - alert, well appearing, and in no distress  Neck - supple, no significant adenopathy  Chest - clear to auscultation, no wheezes, rales or rhonchi, symmetric air entry  Heart - normal rate, regular rhythm, normal S1, S2, no murmurs, rubs, clicks or gallops  Abdomen - soft, nontender, nondistended, no masses or organomegaly  Extremities - peripheral pulses normal, no pedal edema, no clubbing or cyanosis  Skin - normal coloration and turgor, no rashes, no suspicious skin lesions noted      Assessment:   47 yo male who is here for:     ICD-10-CM ICD-9-CM    1.  Anxiety F41.9 300.00 hydrOXYzine pamoate (VISTARIL) 50 mg capsule   2. Hypothyroidism due to acquired atrophy of thyroid E03.4 244.8      246.8    3. Depression with anxiety F41.8 300.4 buPROPion SR (WELLBUTRIN SR) 150 mg SR tablet   4. Type 2 diabetes mellitus without complication, without long-term current use of insulin (HCC) E11.9 250.00    5. Refused influenza vaccine Z28.21 V64.06        Plan:   1. Anxiety. Well controlled with current regimen. Still has episodes of anxiety due to overwhelming with care of mother with dementia. Recommended counselor, but the pt refused. Continue Wellbutrin, Buspar and Vistaril prn   2. Hypothyroidism. Stable. TSH at goal. Continue Synthroid at current dose. 3. DMT2. Due to [facility] but the patient refused to check. Due for eye exam. Continue current medication regimen. Follow-up Disposition: Not on File      I have discussed the diagnosis with the patient and the intended plan as seen in the above orders. The patient has received an after-visit summary and questions were answered concerning future plans. I have discussed medication side effects and warnings with the patient as well. Informed pt to return to the office if new symptoms arise.     The patient was discussed with Dr. Fernie Coronado ( the attending physician)     Sj Morales MD  Family Medicine Resident, PGY-3

## 2018-10-25 DIAGNOSIS — E78.5 HYPERLIPIDEMIA, UNSPECIFIED HYPERLIPIDEMIA TYPE: ICD-10-CM

## 2018-10-25 RX ORDER — ATORVASTATIN CALCIUM 80 MG/1
TABLET, FILM COATED ORAL
Qty: 90 TAB | Refills: 0 | Status: SHIPPED | OUTPATIENT
Start: 2018-10-25 | End: 2019-01-12 | Stop reason: SDUPTHER

## 2018-11-13 DIAGNOSIS — I10 ESSENTIAL HYPERTENSION: ICD-10-CM

## 2018-11-14 RX ORDER — BISOPROLOL FUMARATE AND HYDROCHLOROTHIAZIDE 5; 6.25 MG/1; MG/1
TABLET ORAL
Qty: 90 TAB | Refills: 0 | Status: SHIPPED | OUTPATIENT
Start: 2018-11-14 | End: 2019-02-16 | Stop reason: SDUPTHER

## 2019-01-05 RX ORDER — BUSPIRONE HYDROCHLORIDE 7.5 MG/1
TABLET ORAL
Qty: 180 TAB | Refills: 0 | Status: SHIPPED | OUTPATIENT
Start: 2019-01-05 | End: 2019-03-21 | Stop reason: SDUPTHER

## 2019-01-12 DIAGNOSIS — E78.5 HYPERLIPIDEMIA, UNSPECIFIED HYPERLIPIDEMIA TYPE: ICD-10-CM

## 2019-01-12 RX ORDER — ATORVASTATIN CALCIUM 80 MG/1
TABLET, FILM COATED ORAL
Qty: 90 TAB | Refills: 0 | Status: SHIPPED | OUTPATIENT
Start: 2019-01-12 | End: 2019-04-18 | Stop reason: SDUPTHER

## 2019-01-30 DIAGNOSIS — R80.9 TYPE 2 DIABETES MELLITUS WITH MICROALBUMINURIA, WITHOUT LONG-TERM CURRENT USE OF INSULIN (HCC): ICD-10-CM

## 2019-01-30 DIAGNOSIS — E11.29 TYPE 2 DIABETES MELLITUS WITH MICROALBUMINURIA, WITHOUT LONG-TERM CURRENT USE OF INSULIN (HCC): ICD-10-CM

## 2019-01-30 RX ORDER — GLIPIZIDE 5 MG/1
TABLET, FILM COATED, EXTENDED RELEASE ORAL
Qty: 90 TAB | Refills: 0 | Status: SHIPPED | OUTPATIENT
Start: 2019-01-30 | End: 2019-05-01 | Stop reason: SDUPTHER

## 2019-02-07 DIAGNOSIS — K21.9 GASTROESOPHAGEAL REFLUX DISEASE WITHOUT ESOPHAGITIS: ICD-10-CM

## 2019-02-07 RX ORDER — OMEPRAZOLE 40 MG/1
40 CAPSULE, DELAYED RELEASE ORAL DAILY
Qty: 30 CAP | Refills: 0 | Status: SHIPPED | OUTPATIENT
Start: 2019-02-07 | End: 2019-05-01 | Stop reason: SDUPTHER

## 2019-02-16 DIAGNOSIS — F41.8 DEPRESSION WITH ANXIETY: ICD-10-CM

## 2019-02-16 DIAGNOSIS — I10 ESSENTIAL HYPERTENSION: ICD-10-CM

## 2019-02-17 RX ORDER — BISOPROLOL FUMARATE AND HYDROCHLOROTHIAZIDE 5; 6.25 MG/1; MG/1
TABLET ORAL
Qty: 90 TAB | Refills: 0 | Status: SHIPPED | OUTPATIENT
Start: 2019-02-17 | End: 2019-05-20 | Stop reason: SDUPTHER

## 2019-02-17 RX ORDER — BUPROPION HYDROCHLORIDE 150 MG/1
TABLET, EXTENDED RELEASE ORAL
Qty: 90 TAB | Refills: 0 | Status: SHIPPED | OUTPATIENT
Start: 2019-02-17 | End: 2019-05-20 | Stop reason: SDUPTHER

## 2019-03-21 DIAGNOSIS — E03.4 HYPOTHYROIDISM DUE TO ACQUIRED ATROPHY OF THYROID: ICD-10-CM

## 2019-03-21 RX ORDER — BUSPIRONE HYDROCHLORIDE 7.5 MG/1
TABLET ORAL
Qty: 180 TAB | Refills: 0 | Status: SHIPPED | OUTPATIENT
Start: 2019-03-21 | End: 2019-06-25 | Stop reason: SDUPTHER

## 2019-03-22 RX ORDER — LEVOTHYROXINE SODIUM 300 UG/1
TABLET ORAL
Qty: 30 TAB | Refills: 2 | Status: CANCELLED | OUTPATIENT
Start: 2019-03-22

## 2019-03-22 NOTE — TELEPHONE ENCOUNTER
The patient needs to come to the clinic for appointment and lab check. Will need to check TSH to ensure the dose of Levothyroxine is appropriate, last check was almost one year ago. Will put the order in the chart for TSH. Please call the patient and schedule the appointment. Can come for lab visit prior to actual appointment.      Thank you.  7:31 Jacquie Pritchett MD

## 2019-04-01 DIAGNOSIS — E03.4 HYPOTHYROIDISM DUE TO ACQUIRED ATROPHY OF THYROID: ICD-10-CM

## 2019-04-01 RX ORDER — LEVOTHYROXINE SODIUM 300 UG/1
300 TABLET ORAL
Qty: 30 TAB | Refills: 2 | Status: SHIPPED | OUTPATIENT
Start: 2019-04-01 | End: 2019-04-03

## 2019-04-01 NOTE — TELEPHONE ENCOUNTER
[phone]    Patient called to say he has been out of this medication for 3 days. He wants to know what he is suppose to do as he cannot afford to come in. He was advised that [facility] offers the financial assistance program.    Patient said it is cheaper to get the   150 mcg and take two a day as it is only $4 for 30 days.

## 2019-04-03 RX ORDER — LEVOTHYROXINE SODIUM 150 UG/1
300 TABLET ORAL
Qty: 60 TAB | Refills: 0 | Status: SHIPPED | OUTPATIENT
Start: 2019-04-03 | End: 2019-08-22 | Stop reason: SDUPTHER

## 2019-04-03 NOTE — PROGRESS NOTES
The new script for Synthroid 150 mcg with the instructions to take 2 tabs (300mcg) was sent to the pharmacy per patient request.    7:35 PM  4/3/2019  Karina Melgar MD

## 2019-04-18 DIAGNOSIS — F41.9 ANXIETY: ICD-10-CM

## 2019-04-18 DIAGNOSIS — E78.5 HYPERLIPIDEMIA, UNSPECIFIED HYPERLIPIDEMIA TYPE: ICD-10-CM

## 2019-04-19 RX ORDER — ATORVASTATIN CALCIUM 80 MG/1
TABLET, FILM COATED ORAL
Qty: 90 TAB | Refills: 0 | Status: SHIPPED | OUTPATIENT
Start: 2019-04-19 | End: 2019-07-22 | Stop reason: SDUPTHER

## 2019-04-19 RX ORDER — HYDROXYZINE PAMOATE 50 MG/1
CAPSULE ORAL
Qty: 180 CAP | Refills: 0 | Status: SHIPPED | OUTPATIENT
Start: 2019-04-19 | End: 2019-09-17 | Stop reason: SDUPTHER

## 2019-05-01 DIAGNOSIS — E11.29 TYPE 2 DIABETES MELLITUS WITH MICROALBUMINURIA, WITHOUT LONG-TERM CURRENT USE OF INSULIN (HCC): ICD-10-CM

## 2019-05-01 DIAGNOSIS — R80.9 TYPE 2 DIABETES MELLITUS WITH MICROALBUMINURIA, WITHOUT LONG-TERM CURRENT USE OF INSULIN (HCC): ICD-10-CM

## 2019-05-01 DIAGNOSIS — K21.9 GASTROESOPHAGEAL REFLUX DISEASE WITHOUT ESOPHAGITIS: ICD-10-CM

## 2019-05-01 RX ORDER — OMEPRAZOLE 40 MG/1
CAPSULE, DELAYED RELEASE ORAL
Qty: 30 CAP | Refills: 0 | Status: SHIPPED | OUTPATIENT
Start: 2019-05-01 | End: 2019-07-22 | Stop reason: SDUPTHER

## 2019-05-01 RX ORDER — GLIPIZIDE 5 MG/1
TABLET, FILM COATED, EXTENDED RELEASE ORAL
Qty: 90 TAB | Refills: 0 | Status: SHIPPED | OUTPATIENT
Start: 2019-05-01 | End: 2019-07-22 | Stop reason: SDUPTHER

## 2019-05-20 DIAGNOSIS — F41.8 DEPRESSION WITH ANXIETY: ICD-10-CM

## 2019-05-20 DIAGNOSIS — I10 ESSENTIAL HYPERTENSION: ICD-10-CM

## 2019-05-20 RX ORDER — BUPROPION HYDROCHLORIDE 150 MG/1
TABLET, EXTENDED RELEASE ORAL
Qty: 90 TAB | Refills: 0 | Status: SHIPPED | OUTPATIENT
Start: 2019-05-20 | End: 2019-08-19 | Stop reason: SDUPTHER

## 2019-05-20 RX ORDER — BISOPROLOL FUMARATE AND HYDROCHLOROTHIAZIDE 5; 6.25 MG/1; MG/1
TABLET ORAL
Qty: 90 TAB | Refills: 0 | Status: SHIPPED | OUTPATIENT
Start: 2019-05-20 | End: 2019-08-19 | Stop reason: SDUPTHER

## 2019-06-25 RX ORDER — BUSPIRONE HYDROCHLORIDE 7.5 MG/1
TABLET ORAL
Qty: 180 TAB | Refills: 0 | Status: SHIPPED | OUTPATIENT
Start: 2019-06-25 | End: 2019-09-23 | Stop reason: SDUPTHER

## 2019-07-22 DIAGNOSIS — E11.29 TYPE 2 DIABETES MELLITUS WITH MICROALBUMINURIA, WITHOUT LONG-TERM CURRENT USE OF INSULIN (HCC): ICD-10-CM

## 2019-07-22 DIAGNOSIS — R80.9 TYPE 2 DIABETES MELLITUS WITH MICROALBUMINURIA, WITHOUT LONG-TERM CURRENT USE OF INSULIN (HCC): ICD-10-CM

## 2019-07-22 DIAGNOSIS — K21.9 GASTROESOPHAGEAL REFLUX DISEASE WITHOUT ESOPHAGITIS: ICD-10-CM

## 2019-07-22 DIAGNOSIS — E78.5 HYPERLIPIDEMIA, UNSPECIFIED HYPERLIPIDEMIA TYPE: ICD-10-CM

## 2019-07-23 RX ORDER — ATORVASTATIN CALCIUM 80 MG/1
TABLET, FILM COATED ORAL
Qty: 90 TAB | Refills: 0 | Status: SHIPPED | OUTPATIENT
Start: 2019-07-23 | End: 2019-10-21 | Stop reason: SDUPTHER

## 2019-07-23 RX ORDER — OMEPRAZOLE 40 MG/1
CAPSULE, DELAYED RELEASE ORAL
Qty: 30 CAP | Refills: 0 | Status: SHIPPED | OUTPATIENT
Start: 2019-07-23 | End: 2019-09-18 | Stop reason: SDUPTHER

## 2019-07-23 RX ORDER — GLIPIZIDE 5 MG/1
TABLET, FILM COATED, EXTENDED RELEASE ORAL
Qty: 90 TAB | Refills: 0 | Status: SHIPPED | OUTPATIENT
Start: 2019-07-23 | End: 2019-10-25 | Stop reason: SDUPTHER

## 2019-09-17 DIAGNOSIS — F41.9 ANXIETY: ICD-10-CM

## 2019-09-19 RX ORDER — HYDROXYZINE PAMOATE 50 MG/1
CAPSULE ORAL
Qty: 180 CAP | Refills: 0 | Status: SHIPPED | OUTPATIENT
Start: 2019-09-19 | End: 2019-10-25 | Stop reason: SDUPTHER

## 2019-10-17 DIAGNOSIS — E78.5 HYPERLIPIDEMIA, UNSPECIFIED HYPERLIPIDEMIA TYPE: ICD-10-CM

## 2019-10-17 DIAGNOSIS — E11.9 TYPE 2 DIABETES MELLITUS WITHOUT COMPLICATION, WITHOUT LONG-TERM CURRENT USE OF INSULIN (HCC): ICD-10-CM

## 2019-10-18 RX ORDER — ATORVASTATIN CALCIUM 80 MG/1
TABLET, FILM COATED ORAL
Qty: 90 TAB | Refills: 0 | OUTPATIENT
Start: 2019-10-18

## 2019-10-18 RX ORDER — METFORMIN HYDROCHLORIDE 1000 MG/1
TABLET ORAL
Qty: 180 TAB | Refills: 3 | OUTPATIENT
Start: 2019-10-18

## 2019-10-21 DIAGNOSIS — E78.5 HYPERLIPIDEMIA, UNSPECIFIED HYPERLIPIDEMIA TYPE: ICD-10-CM

## 2019-10-21 DIAGNOSIS — E11.9 TYPE 2 DIABETES MELLITUS WITHOUT COMPLICATION, WITHOUT LONG-TERM CURRENT USE OF INSULIN (HCC): ICD-10-CM

## 2019-10-21 RX ORDER — ATORVASTATIN CALCIUM 80 MG/1
TABLET, FILM COATED ORAL
Qty: 90 TAB | Refills: 2 | Status: SHIPPED | OUTPATIENT
Start: 2019-10-21 | End: 2020-07-14

## 2019-10-22 RX ORDER — METFORMIN HYDROCHLORIDE 1000 MG/1
TABLET ORAL
Qty: 180 TAB | Refills: 3 | OUTPATIENT
Start: 2019-10-22

## 2019-10-22 RX ORDER — ATORVASTATIN CALCIUM 80 MG/1
TABLET, FILM COATED ORAL
Qty: 90 TAB | Refills: 0 | OUTPATIENT
Start: 2019-10-22

## 2019-10-25 ENCOUNTER — OFFICE VISIT (OUTPATIENT)
Dept: FAMILY MEDICINE CLINIC | Age: 49
End: 2019-10-25

## 2019-10-25 VITALS
TEMPERATURE: 98.4 F | BODY MASS INDEX: 29.12 KG/M2 | WEIGHT: 208 LBS | RESPIRATION RATE: 16 BRPM | SYSTOLIC BLOOD PRESSURE: 118 MMHG | HEIGHT: 71 IN | HEART RATE: 60 BPM | DIASTOLIC BLOOD PRESSURE: 78 MMHG | OXYGEN SATURATION: 97 %

## 2019-10-25 DIAGNOSIS — E11.9 TYPE 2 DIABETES MELLITUS WITHOUT COMPLICATION, WITHOUT LONG-TERM CURRENT USE OF INSULIN (HCC): ICD-10-CM

## 2019-10-25 DIAGNOSIS — E03.9 ACQUIRED HYPOTHYROIDISM: ICD-10-CM

## 2019-10-25 DIAGNOSIS — E78.5 HYPERLIPIDEMIA, UNSPECIFIED HYPERLIPIDEMIA TYPE: ICD-10-CM

## 2019-10-25 DIAGNOSIS — F41.1 GAD (GENERALIZED ANXIETY DISORDER): ICD-10-CM

## 2019-10-25 DIAGNOSIS — F43.22 ADJUSTMENT REACTION WITH ANXIOUS MOOD: Primary | ICD-10-CM

## 2019-10-25 RX ORDER — HYDROXYZINE PAMOATE 100 MG/1
CAPSULE ORAL
Qty: 120 CAP | Refills: 2 | Status: SHIPPED | OUTPATIENT
Start: 2019-10-25 | End: 2020-01-15 | Stop reason: CLARIF

## 2019-10-25 RX ORDER — METFORMIN HYDROCHLORIDE 1000 MG/1
1000 TABLET ORAL 2 TIMES DAILY
Qty: 180 TAB | Refills: 3 | Status: SHIPPED | OUTPATIENT
Start: 2019-10-25 | End: 2020-10-01 | Stop reason: SDUPTHER

## 2019-10-25 RX ORDER — LEVOTHYROXINE SODIUM 150 UG/1
300 TABLET ORAL
Qty: 60 TAB | Refills: 0 | Status: SHIPPED | OUTPATIENT
Start: 2019-10-25 | End: 2019-12-09 | Stop reason: SDUPTHER

## 2019-10-25 RX ORDER — GLIPIZIDE 5 MG/1
5 TABLET, FILM COATED, EXTENDED RELEASE ORAL DAILY
Qty: 90 TAB | Refills: 0 | Status: SHIPPED | OUTPATIENT
Start: 2019-10-25 | End: 2020-01-28 | Stop reason: SDUPTHER

## 2019-10-25 RX ORDER — BUSPIRONE HYDROCHLORIDE 10 MG/1
TABLET ORAL
Qty: 60 TAB | Refills: 2 | Status: SHIPPED | OUTPATIENT
Start: 2019-10-25 | End: 2020-03-15 | Stop reason: SDUPTHER

## 2019-10-25 NOTE — TELEPHONE ENCOUNTER
Patient now asking for advice of   Dr. Lamine Peralta. Patient has not made any appointment as I left voice mail this morning. /telephone   Received: Today   Message Contents   Nelli Ryan 63             General Message/Vendor Calls     Caller's first and last name:       Reason for call: Pt would like to know can he be prescribed enough Metformin until next week when he is able to give blood work.        Callback required yes/no and why: yes       Best contact number(s): [phone]       Details to clarify the request:       Jessie Mcintyre

## 2019-10-25 NOTE — PROGRESS NOTES
Franco Stoddard. is a 44 y.o. male    Issues discussed today include:    Chief Complaint   Patient presents with    Follow-up     Diabetes    Medication Refill     Metformin and Levothyroxine       1) Depression, anxiety, grief: Pt came for med refill, but is really struggling with feeling anxious and down. A good friend [place]  suddenly. He recalls many fond memories with this friend, fishing and talking. Pt lives with his parents who are both elderly, his father is an alcoholic and his mother has dementia. He feels overwhelmed often with his responsibilities at home and also financially as he is not able to work right now. He denies SI, but expressed he is not sure who he can talk to about his feelings. He has h/o depression and anxiety, is taking wellbutrin and buspar, but does not feel they are helping. 2) DMII:  Taking metformin 1000mg bid without SEs. Not checking glucose at home. Also on lipitor 80mg nightly. Not on ACEi. + tobacco use. Pt is concerned about some moderate flank pain off and on, wonders if it could be his kidneys. No dysuria, hematuria, fever, chills, nausea, emesis, abdominal pain. + h/o renal stones. Lab Results   Component Value Date/Time    Hemoglobin A1c 6.7 (H) 2018 01:09 PM     3) Hypothyroidism:  Taking synthroid 300mcg daily in am. Denies bowel changes, tremors, fatigue. Does feel anxious and overwhelmed often. (see #1 above).   Lab Results   Component Value Date/Time    TSH 0.751 2018 01:09 PM    TSH 95.610 (H) 2017 08:58 AM       Data reviewed or ordered today:       Other problems include:  Patient Active Problem List   Diagnosis Code    Hypothyroid E03.9    Kidney stone N20.0    Anxiety disorder F41.9    Depression with anxiety F41.8    Lipid disorder E78.9    Smoker F17.200    HTN (hypertension) I10    TRACY on CPAP G47.33, Z99.89    T2DM (type 2 diabetes mellitus) [facility] E11.9    Poor dentition K08.9    Stress and adjustment reaction F43.29       Medications:  Current Outpatient Medications   Medication Sig Dispense Refill    metFORMIN (GLUCOPHAGE) 1,000 mg tablet Take 1 Tab by mouth two (2) times a day. 180 Tab 3    glipiZIDE SR (GLUCOTROL XL) 5 mg CR tablet Take 1 Tab by mouth daily. 90 Tab 0    levothyroxine (SYNTHROID) 150 mcg tablet Take 2 Tabs by mouth Daily (before breakfast). 60 Tab 0    hydrOXYzine pamoate (VISTARIL) 100 mg capsule TAKE 1 CAPSULE BY MOUTH every 6 hours AS NEEDED FOR ANXIETY 120 Cap 2    busPIRone (BUSPAR) 10 mg tablet TAKE 1 TABLET BY MOUTH TWICE DAILY 60 Tab 2    atorvastatin (LIPITOR) 80 mg tablet TAKE 1 TABLET BY MOUTH NIGHTLY 90 Tab 2    omeprazole (PRILOSEC) 40 mg capsule TAKE 1 CAPSULE BY MOUTH ONCE DAILY 90 Cap 0    buPROPion SR (WELLBUTRIN SR) 150 mg SR tablet Take 1 Tab by mouth daily. 90 Tab 3    bisoprolol-hydroCHLOROthiazide (ZIAC) 5-6.25 mg per tablet TAKE 1 TABLET BY MOUTH ONCE DAILY 90 Tab 3    Blood-Glucose Meter monitoring kit Use once a day 1 Kit 0    Lancets misc Use once a day with glucometer 1 Each 11    glucose blood VI test strips (BLOOD GLUCOSE TEST) strip Use once a day with glucometer 100 Strip 1    alcohol swabs (ALCOHOL PADS) padm Use once a day when checking blood glucose readings 100 Pad 1       Allergies:  No Known Allergies    LMP:  No LMP for male patient.     Social History     Socioeconomic History    Marital status: SINGLE     Spouse name: Not on file    Number of children: Not on file    Years of education: Not on file    Highest education level: Not on file   Occupational History    Not on file   Social Needs    Financial resource strain: Not on file    Food insecurity:     Worry: Not on file     Inability: Not on file    Transportation needs:     Medical: Not on file     Non-medical: Not on file   Tobacco Use    Smoking status: Current Every Day Smoker     Packs/day: 0.25     Years: 20.00     Pack years: 5.00     Types: Cigarettes  Smokeless tobacco: Former User     Types: Snuff   Substance and Sexual Activity    Alcohol use: No     Comment: occasionally once a month    Drug use: No     Comment: 1/2 pack a day    Sexual activity: Yes     Partners: Female   Lifestyle    Physical activity:     Days per week: Not on file     Minutes per session: Not on file    Stress: Not on file   Relationships    Social connections:     Talks on phone: Not on file     Gets together: Not on file     Attends Quaker service: Not on file     Active member of club or organization: Not on file     Attends meetings of clubs or organizations: Not on file     Relationship status: Not on file    Intimate partner violence:     Fear of current or ex partner: Not on file     Emotionally abused: Not on file     Physically abused: Not on file     Forced sexual activity: Not on file   Other Topics Concern    Not on file   Social History Narrative    Not on file       Family History   Problem Relation Age of Onset    Hypertension Mother     Heart Disease Mother         valve placement     Hypertension Father     Asthma Father          Physical Exam   Visit Vitals  /78   Pulse 60   Temp 98.4 °F (36.9 °C) (Oral)   Resp 16   Ht 5' 11\" (1.803 m)   Wt 208 lb (94.3 kg)   SpO2 97%   BMI 29.01 kg/m²      BP Readings from Last 3 Encounters:   10/25/19 118/78   10/19/18 137/78   04/24/18 142/83     Constitutional: Appears well,  No acute distress, Vitals noted  Psychiatric:  Affect normal, Alert and Oriented to person/place/time  Eyes:  Conjunctiva clear, no drainage  ENT:  External ears and nose normal, Mucous membranes moist  Neck:  General inspection normal. Supple. Heart:  Normal HR, Normal S1 and S2,  Regular rhythm. No murmurs, rubs or gallops.   Lungs:  Clear to auscultation, good respiratory effort, no wheezes, rales or rhonchi  Extremities: Without edema, good peripheral pulses  Skin:  Warm to palpation, without rashes      Assessment/Plan: ICD-10-CM ICD-9-CM    1. Adjustment reaction with anxious mood F43.22 309.24    2. JULIANNA (generalized anxiety disorder) F41.1 300.02 hydrOXYzine pamoate (VISTARIL) 100 mg capsule      busPIRone (BUSPAR) 10 mg tablet   3. Type 2 diabetes mellitus without complication, without long-term current use of insulin (HCC) E11.9 250.00 metFORMIN (GLUCOPHAGE) 1,000 mg tablet      glipiZIDE SR (GLUCOTROL XL) 5 mg CR tablet      HEMOGLOBIN A1C WITH EAG      CBC WITH AUTOMATED DIFF      METABOLIC PANEL, COMPREHENSIVE      URINALYSIS W/ RFLX MICROSCOPIC   4. Acquired hypothyroidism E03.9 244.9 levothyroxine (SYNTHROID) 150 mcg tablet      TSH 3RD GENERATION   5. Hyperlipidemia, unspecified hyperlipidemia type E78.5 272.4 LIPID PANEL       Grief with acute on chronic anxiety and underlying depression. Mood sxs not well controlled on current regimen, but also with normal grieving. Increase buspar from 7.5mg bid to 10mg bid  Add atarax 100mg tid PRN   Consider switching wellbutrin to SSRI at fu visit    Refilled chronic meds for DM, thyroid  Lab closed, pt to return for lab work asap  UA given, but discovered it was not collected/stored correctly to be sent to lab    Follow-up and Dispositions    · Return for 2-4 weeks for mood and HTN, DM, thyroid follow up.        Brent Fallon MD

## 2019-10-25 NOTE — PATIENT INSTRUCTIONS
Recovering From Depression: Care Instructions  Your Care Instructions    Taking good care of yourself is important as you recover from depression. In time, your symptoms will fade as your treatment takes hold. Do not give up. Instead, focus your energy on getting better. Your mood will improve. It just takes some time. Focus on things that can help you feel better, such as being with friends and family, eating well, and getting enough rest. But take things slowly. Do not do too much too soon. You will begin to feel better gradually. Follow-up care is a key part of your treatment and safety. Be sure to make and go to all appointments, and call your doctor if you are having problems. It's also a good idea to know your test results and keep a list of the medicines you take. How can you care for yourself at home? Be realistic  · If you have a large task to do, break it up into smaller steps you can handle, and just do what you can. · You may want to put off important decisions until your depression has lifted. If you have plans that will have a major impact on your life, such as marriage, divorce, or a job change, try to wait a bit. Talk it over with friends and loved ones who can help you look at the overall picture first.  · Reaching out to people for help is important. Do not isolate yourself. Let your family and friends help you. Find someone you can trust and confide in, and talk to that person. · Be patient, and be kind to yourself. Remember that depression is not your fault and is not something you can overcome with willpower alone. Treatment is necessary for depression, just like for any other illness. Feeling better takes time, and your mood will improve little by little. Stay active  · Stay busy and get outside. Take a walk, or try some other light exercise. · Talk with your doctor about an exercise program. Exercise can help with mild depression. · Go to a movie or concert.  Take part in a Mandaeism activity or other social gathering. Go to a [employer] game. · Ask a friend to have dinner with you. Take care of yourself  · Eat a balanced diet with plenty of fresh fruits and vegetables, whole grains, and lean protein. If you have lost your appetite, eat small snacks rather than large meals. · Avoid drinking alcohol or using illegal drugs. Do not take medicines that have not been prescribed for you. They may interfere with medicines you may be taking for depression, or they may make your depression worse. · Take your medicines exactly as they are prescribed. You may start to feel better within 1 to 3 weeks of taking antidepressant medicine. But it can take as many as 6 to 8 weeks to see more improvement. If you have questions or concerns about your medicines, or if you do not notice any improvement by 3 weeks, talk to your doctor. · If you have any side effects from your medicine, tell your doctor. Antidepressants can make you feel tired, dizzy, or nervous. Some people have dry mouth, constipation, headaches, sexual problems, or diarrhea. Many of these side effects are mild and will go away on their own after you have been taking the medicine for a few weeks. Some may last longer. Talk to your doctor if side effects are bothering you too much. You might be able to try a different medicine. · Get enough sleep. If you have problems sleeping:  ? Go to bed at the same time every night, and get up at the same time every morning. ? Keep your bedroom dark and quiet. ? Do not exercise after 5:00 p.m.  ? Avoid drinks with caffeine after 5:00 p.m. · Avoid sleeping pills unless they are prescribed by the doctor treating your depression. Sleeping pills may make you groggy during the day, and they may interact with other medicine you are taking. · If you have any other illnesses, such as diabetes, heart disease, or high blood pressure, make sure to continue with your treatment.  Tell your doctor about all of the medicines you take, including those with or without a prescription. · Keep the numbers for these national suicide hotlines: 4-064-962-TALK (7-494.304.1263) and 8-115-LCVHQKK (6-920.553.9678). If you or someone you know talks about suicide or feeling hopeless, get help right away. When should you call for help? Call 911 anytime you think you may need emergency care. For example, call if:    · You feel like hurting yourself or someone else.     · Someone you know has depression and is about to attempt or is attempting suicide.   [facility] your doctor now or seek immediate medical care if:    · You hear voices.     · Someone you know has depression and:  ? Starts to give away his or her possessions. ? Uses illegal drugs or drinks alcohol heavily. ? Talks or writes about death, including writing suicide notes or talking about guns, knives, or pills. ? Starts to spend a lot of time alone. ? Acts very aggressively or suddenly appears calm.    Watch closely for changes in your health, and be sure to contact your doctor if:    · You do not get better as expected. Where can you learn more? Go to http://cathleen-filipe.info/. Enter T786 in the search box to learn more about \"Recovering From Depression: Care Instructions. \"  Current as of: May 28, 2019  Content Version: 12.2  © 4906-6279 [employer]. Care instructions adapted under license by [employer] (which disclaims liability or warranty for this information). If you have questions about a medical condition or this instruction, always ask your healthcare professional. [place] [address] any warranty or liability for your use of this information. Anxiety Disorder: Care Instructions  Your Care Instructions    Anxiety is a normal reaction to stress. Difficult situations can cause you to have symptoms such as sweaty palms and a nervous feeling. In an anxiety disorder, the symptoms are far more severe.  Constant worry, muscle tension, trouble sleeping, nausea and diarrhea, and other symptoms can make normal daily activities difficult or impossible. These symptoms may occur for no reason, and they can affect your work, school, or social life. Medicines, counseling, and self-care can all help. Follow-up care is a key part of your treatment and safety. Be sure to make and go to all appointments, and call your doctor if you are having problems. It's also a good idea to know your test results and keep a list of the medicines you take. How can you care for yourself at home? · Take medicines exactly as directed. Call your doctor if you think you are having a problem with your medicine. · Go to your counseling sessions and follow-up appointments. · Recognize and accept your anxiety. Then, when you are in a situation that makes you anxious, say to yourself, \"This is not an emergency. I feel uncomfortable, but I am not in danger. I can keep going even if I feel anxious. \"  · Be kind to your body:  ? Relieve tension with exercise or a massage. ? Get enough rest.  ? Avoid alcohol, caffeine, nicotine, and illegal drugs. They can increase your anxiety level and cause sleep problems. ? Learn and do relaxation techniques. See below for more about these techniques. · Engage your mind. Get out and do something you enjoy. Go to a funny movie, or take a walk or hike. Plan your day. Having too much or too little to do can make you anxious. · Keep a record of your symptoms. Discuss your fears with a good friend or family member, or join a support group for people with similar problems. Talking to others sometimes relieves stress. · Get involved in social groups, or volunteer to help others. Being alone sometimes makes things seem worse than they are. · Get at least 30 minutes of exercise on most days of the week to relieve stress. Walking is a good choice.  You also may want to do other activities, such as running, swimming, cycling, or playing tennis or team sports. Relaxation techniques  Do relaxation exercises 10 to 20 minutes a day. You can play soothing, relaxing music while you do them, if you wish. · Tell others in your house that you are going to do your relaxation exercises. Ask them not to disturb you. · Find a comfortable place, away from all distractions and noise. · Lie down on your back, or sit with your back straight. · Focus on your breathing. Make it slow and steady. · Breathe in through your nose. Breathe out through either your nose or mouth. · Breathe deeply, filling up the area between your navel and your rib cage. Breathe so that your belly goes up and down. · Do not hold your breath. · Breathe like this for 5 to 10 minutes. Notice the feeling of calmness throughout your whole body. As you continue to breathe slowly and deeply, relax by doing the following for another 5 to 10 minutes:  · Tighten and relax each muscle group in your body. You can begin at your toes and work your way up to your head. · Imagine your muscle groups relaxing and becoming heavy. · Empty your mind of all thoughts. · Let yourself relax more and more deeply. · Become aware of the state of calmness that surrounds you. · When your relaxation time is over, you can bring yourself back to alertness by moving your fingers and toes and then your hands and feet and then stretching and moving your entire body. Sometimes people fall asleep during relaxation, but they usually wake up shortly afterward. · Always give yourself time to return to full alertness before you drive a car or do anything that might cause an accident if you are not fully alert. Never play a relaxation tape while you drive a car. When should you call for help? Call 911 anytime you think you may need emergency care.  For example, call if:    · You feel you cannot stop from hurting yourself or someone else.   Edie Mo the numbers for these national suicide hotlines: 6-702-347-TALK (6-844.436.6492) and 4-937-XCIUQTY (2-131.414.5468). If you or someone you know talks about suicide or feeling hopeless, get help right away.   Watch closely for changes in your health, and be sure to contact your doctor if:    · You have anxiety or fear that affects your life.     · You have symptoms of anxiety that are new or different from those you had before. Where can you learn more? Go to http://cathleen-filipe.info/. Enter P754 in the search box to learn more about \"Anxiety Disorder: Care Instructions. \"  Current as of: May 28, 2019  Content Version: 12.2  © 7615-3510 [employer]. Care instructions adapted under license by [employer] (which disclaims liability or warranty for this information). If you have questions about a medical condition or this instruction, always ask your healthcare professional. [address] any warranty or liability for your use of this information.

## 2019-10-25 NOTE — TELEPHONE ENCOUNTER
Left voice mail appointment required. [facility]             Medication Refill     Caller (if not patient):   pt     Relationship of caller (if not patient):   pt     Best contact number(s):   [phone]     Name of medication and dosage if known:   Metformin     Is patient out of this medication (yes/no): Tomorrow will be out     Pharmacy name:   [address] [place] [address] listed in chart? (yes/no):   Pharmacy phone number:   [phone]     Details to clarify the request:   Requesting a refill of medication \"Metformin\". Pt stated, he will be out of medication tomorrow. Pt requested refill from [address] [facility] and was informed to contact the office to schedule appt. Pt will schedule an appt this month however requesting refill to be sent.      Danni Palomino

## 2019-10-25 NOTE — PROGRESS NOTES
Chief Complaint   Patient presents with    Follow-up     Diabetes    Medication Refill     Metformin and Levothyroxine     Blood pressure 118/78, pulse 60, temperature 98.4 °F (36.9 °C), temperature source Oral, resp. rate 16, height 5' 11\" (1.803 m), weight 208 lb (94.3 kg), SpO2 97 %. 1. Have you been to the ER, urgent care clinic since your last visit? Hospitalized since your last visit? No    2. Have you seen or consulted any other health care providers outside of the [address] [place] [address] since your last visit? Include any pap smears or colon screening.  No

## 2019-10-25 NOTE — TELEPHONE ENCOUNTER
Patient called for status of medication, Informed an appointment is required. Scheduled for 4pm today because the patient is out of medication.

## 2019-12-28 DIAGNOSIS — F41.9 ANXIETY: ICD-10-CM

## 2019-12-29 RX ORDER — HYDROXYZINE PAMOATE 50 MG/1
CAPSULE ORAL
Qty: 180 CAP | Refills: 0 | OUTPATIENT
Start: 2019-12-29

## 2020-01-14 ENCOUNTER — TELEPHONE (OUTPATIENT)
Dept: FAMILY MEDICINE CLINIC | Age: 50
End: 2020-01-14

## 2020-01-14 NOTE — TELEPHONE ENCOUNTER
Patient states he was not able to  medication as out stock for 100 mg (hydrOXYzine pamoate (VISTARIL) 100 mg capsule         Patient states they have it in 50 mg and asking that you send that in and he will just take the 2 pills a day. Patient need medication and is out.  Asking this be addressed today

## 2020-01-15 DIAGNOSIS — E03.9 ACQUIRED HYPOTHYROIDISM: ICD-10-CM

## 2020-01-15 RX ORDER — HYDROXYZINE 50 MG/1
100 TABLET, FILM COATED ORAL
Qty: 60 TAB | Refills: 1 | Status: SHIPPED | OUTPATIENT
Start: 2020-01-15 | End: 2020-03-17 | Stop reason: SDUPTHER

## 2020-01-15 NOTE — TELEPHONE ENCOUNTER
Rx sent for 50mg tabs to be taken as 2 tabs q6hrs prn  Would like pt to make fu for mood, hypothyroidism, diabetes - with me if possible or another provider if he prefers. Labs ordered and pt could come for lab draw prior to appt if convenient, if not we will collect when he comes in for appt.

## 2020-01-16 RX ORDER — LEVOTHYROXINE SODIUM 150 UG/1
300 TABLET ORAL
Qty: 60 TAB | Refills: 0 | Status: SHIPPED | OUTPATIENT
Start: 2020-01-16 | End: 2020-03-16 | Stop reason: SDUPTHER

## 2020-01-16 NOTE — TELEPHONE ENCOUNTER
Refilling synthroid for 1 mo again bc I don't see that nurse called pt to inform him that appt required for further refills after last request. When you reach him about appt, PLEASE tell him I will not refill synthroid again if he has not had labs or provider appt.  Thank you, INDIRA

## 2020-01-28 ENCOUNTER — TELEPHONE (OUTPATIENT)
Dept: FAMILY MEDICINE CLINIC | Age: 50
End: 2020-01-28

## 2020-01-28 DIAGNOSIS — E11.9 TYPE 2 DIABETES MELLITUS WITHOUT COMPLICATION, WITHOUT LONG-TERM CURRENT USE OF INSULIN (HCC): ICD-10-CM

## 2020-01-28 RX ORDER — GLIPIZIDE 5 MG/1
5 TABLET, FILM COATED, EXTENDED RELEASE ORAL DAILY
Qty: 30 TAB | Refills: 0 | Status: SHIPPED | OUTPATIENT
Start: 2020-01-28 | End: 2020-03-04 | Stop reason: SDUPTHER

## 2020-01-28 NOTE — TELEPHONE ENCOUNTER
Refilling glipizide for 1 month only  Pt needs to be seen prior to further refills of DM or hypothyroid meds, etc  Routing to nurse to notify pt and help with making soonest avail appt

## 2020-01-28 NOTE — TELEPHONE ENCOUNTER
Attempted to call patient to schedule a follow up appointment per Dr. Prentiss Dubin in the next 2-3 weeks. Left voicemail for patient to return call.

## 2020-02-03 NOTE — TELEPHONE ENCOUNTER
Attempted to call patient to schedule follow up appointment per Malena Oakley. Patient was not home at the time of call.

## 2020-02-13 NOTE — TELEPHONE ENCOUNTER
[phone]  Called patient and the message said phone number BUSY. Dr. Felicitas Plummer, would you like to do a letter for the patient to be mailed about this? Thanks. Also there is a message in [email or web address]. 1/15/20 [facility] per dr. Ena Faustin call pt to let him know rx sent and assist with making f/u appt for his chronic conditions, see connect care if needed/[facility]            [place]/Telephone   Received: Today   Message Contents   [address] Office   Phone Number: [phone]             Pt is returning a missed call from the practice. Best contact is [phone].

## 2020-02-27 DIAGNOSIS — E11.9 TYPE 2 DIABETES MELLITUS WITHOUT COMPLICATION, WITHOUT LONG-TERM CURRENT USE OF INSULIN (HCC): ICD-10-CM

## 2020-02-27 RX ORDER — GLIPIZIDE 5 MG/1
5 TABLET, FILM COATED, EXTENDED RELEASE ORAL DAILY
Qty: 30 TAB | Refills: 0 | OUTPATIENT
Start: 2020-02-27

## 2020-02-29 DIAGNOSIS — E11.9 TYPE 2 DIABETES MELLITUS WITHOUT COMPLICATION, WITHOUT LONG-TERM CURRENT USE OF INSULIN (HCC): ICD-10-CM

## 2020-03-03 RX ORDER — GLIPIZIDE 5 MG/1
5 TABLET, FILM COATED, EXTENDED RELEASE ORAL DAILY
Qty: 30 TAB | Refills: 5 | OUTPATIENT
Start: 2020-03-03

## 2020-03-04 DIAGNOSIS — E11.9 TYPE 2 DIABETES MELLITUS WITHOUT COMPLICATION, WITHOUT LONG-TERM CURRENT USE OF INSULIN (HCC): ICD-10-CM

## 2020-03-04 RX ORDER — GLIPIZIDE 5 MG/1
5 TABLET, FILM COATED, EXTENDED RELEASE ORAL DAILY
Qty: 30 TAB | Refills: 0 | Status: SHIPPED | OUTPATIENT
Start: 2020-03-04 | End: 2020-03-30 | Stop reason: SDUPTHER

## 2020-03-04 NOTE — TELEPHONE ENCOUNTER
Patient is completely out of this Rx and has been for 2 days he know he needs to come in for an appt and be seen and get labs drawn for DM check and he states he'll come in next week some time

## 2020-03-10 DIAGNOSIS — E03.9 ACQUIRED HYPOTHYROIDISM: ICD-10-CM

## 2020-03-10 RX ORDER — LEVOTHYROXINE SODIUM 150 UG/1
300 TABLET ORAL
Qty: 60 TAB | Refills: 0 | OUTPATIENT
Start: 2020-03-10

## 2020-03-10 NOTE — TELEPHONE ENCOUNTER
----- Message from Morgan Mccollum sent at 3/9/2020  6:48 PM EDT -----  Regarding: Dr Lyly Richardson  Medication Refill    Caller (if not patient):      Relationship of caller (if not patient):      Best contact number(s):[phone]      Name of medication and dosage if known: \"Levothyroxine 150mcg\"      Is patient out of this medication (yes/no): yes      Pharmacy name: [facility] [address] listed in chart? (yes/no): yes    Pharmacy phone [phone]      Details to clarify the request: Pt is reporting that his Rx bottle indicated that he needs labs before filling, however, he is not comfortable coming in to the practice due to the virus.       Morgan Mccollum

## 2020-03-10 NOTE — TELEPHONE ENCOUNTER
Pt needs to have labs prior to refills, or ideally a provider appt to review chronic conditions and adjust meds if needed  He has has 2 months to make such appts

## 2020-03-13 NOTE — TELEPHONE ENCOUNTER
[phone]    Patient returned call to Dr. Suha Farrell. Said again he is not coming to the office to catch the coronovirus. He was also reminded the office contacted him in January to make appointment.   1/15/20 [facility] per dr. Yaneth Santoyo call pt to let him know rx sent and assist with making f/u appt for his chronic conditions, see connect care if needed/[facility]

## 2020-03-13 NOTE — TELEPHONE ENCOUNTER
Patient asking that Dr. Kellee Rendon call him directly regarding refusal of medication. Notes again he doesn't feel comfortable coming to office with this\" coronavirus\" going on. 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     Call [phone]

## 2020-03-14 NOTE — TELEPHONE ENCOUNTER
I called pt on Fri 3/13 afternoon, no answer - so left vm asking to call me back. Unfortunately, I was not able to take his call last evening, but will try to reach him again over the weekend. Perhaps he could go to [facility] to get chronic dz labs drawn as he has not had TSH, etc in 2 years.   Will discuss this with him

## 2020-03-15 DIAGNOSIS — F41.1 GAD (GENERALIZED ANXIETY DISORDER): ICD-10-CM

## 2020-03-15 RX ORDER — BUSPIRONE HYDROCHLORIDE 10 MG/1
TABLET ORAL
Qty: 180 TAB | Refills: 0 | Status: SHIPPED | OUTPATIENT
Start: 2020-03-15 | End: 2020-07-16 | Stop reason: SDUPTHER

## 2020-03-16 DIAGNOSIS — E03.9 ACQUIRED HYPOTHYROIDISM: ICD-10-CM

## 2020-03-16 RX ORDER — LEVOTHYROXINE SODIUM 150 UG/1
300 TABLET ORAL
Qty: 60 TAB | Refills: 0 | Status: SHIPPED | OUTPATIENT
Start: 2020-03-16 | End: 2020-04-27 | Stop reason: SDUPTHER

## 2020-03-16 NOTE — PROGRESS NOTES
In light of the escalating coronavirus situation, I have sent rx for levothyroxine 150mcg take 2 tabs daily # 60 and will continue to follow month to month to see if pt can safely come for lab work prior to additional refills as it has been 2 years since his last labs

## 2020-03-17 RX ORDER — HYDROXYZINE 50 MG/1
100 TABLET, FILM COATED ORAL
Qty: 60 TAB | Refills: 1 | Status: SHIPPED | OUTPATIENT
Start: 2020-03-17 | End: 2020-04-27 | Stop reason: SDUPTHER

## 2020-03-19 NOTE — TELEPHONE ENCOUNTER
Message to be relayed to the patient.         Patient Call     Patrick Cueva MD  You 3 days ago      Hi there,     Can you please call pt to let him know I sent levothyroxine rx given severity of coronavirus situation and will see if he can safely come in 1 month for lab work prior to further refills     Thanks, AP    Routing comment          Patient Call     Patrick Cueva MD  You 3 days ago      Hi there,     Can you please call pt to let him know I sent levothyroxine rx given severity of coronavirus situation and will see if he can safely come in 1 month for lab work prior to further refills     Thanks, AP    Routing comment

## 2020-03-30 ENCOUNTER — TELEPHONE (OUTPATIENT)
Dept: FAMILY MEDICINE CLINIC | Age: 50
End: 2020-03-30

## 2020-03-30 DIAGNOSIS — E11.9 TYPE 2 DIABETES MELLITUS WITHOUT COMPLICATION, WITHOUT LONG-TERM CURRENT USE OF INSULIN (HCC): ICD-10-CM

## 2020-03-30 RX ORDER — GLIPIZIDE 5 MG/1
5 TABLET, FILM COATED, EXTENDED RELEASE ORAL DAILY
Qty: 30 TAB | Refills: 0 | Status: SHIPPED | OUTPATIENT
Start: 2020-03-30 | End: 2020-04-27 | Stop reason: DRUGHIGH

## 2020-03-31 ENCOUNTER — TELEPHONE (OUTPATIENT)
Dept: FAMILY MEDICINE CLINIC | Age: 50
End: 2020-03-31

## 2020-03-31 DIAGNOSIS — K04.7 DENTAL ABSCESS: Primary | ICD-10-CM

## 2020-03-31 DIAGNOSIS — E03.9 ACQUIRED HYPOTHYROIDISM: ICD-10-CM

## 2020-03-31 DIAGNOSIS — K02.9 DENTAL CARIES: ICD-10-CM

## 2020-03-31 DIAGNOSIS — E11.9 TYPE 2 DIABETES MELLITUS WITHOUT COMPLICATION, WITHOUT LONG-TERM CURRENT USE OF INSULIN (HCC): ICD-10-CM

## 2020-03-31 NOTE — TELEPHONE ENCOUNTER
----- Message from Harshil Champion sent at 3/31/2020 12:55 PM EDT -----  Regarding: Dr. Gautam Gonzales Medication Refill    Caller (if not patient):      Relationship of caller (if not patient):      Best contact number(s): [phone]      Name of medication and dosage if known: Amoxicillin       Is patient out of this medication (yes/no): N/A       Pharmacy name: [address] [place] [address] listed in chart? (yes/no): Yes     Pharmacy phone number: [phone]      Details to clarify the request: Pt stated he has an abscess upper back gum.       Harshil Champion

## 2020-03-31 NOTE — TELEPHONE ENCOUNTER
Last seen at Friday, October 25, 2019 04:00 PM 4 [facility]MAIN OFFICE, [facility], SameDay[facility], 15min.   Diabetes f/u

## 2020-04-01 RX ORDER — AMOXICILLIN 500 MG/1
500 CAPSULE ORAL 3 TIMES DAILY
Qty: 30 CAP | Refills: 0 | Status: SHIPPED | OUTPATIENT
Start: 2020-04-01 | End: 2020-04-11

## 2020-04-01 NOTE — TELEPHONE ENCOUNTER
I attempted to reach pt by phone, his father answered the phone and said pt was not at home and should be back around 2pm today. Pt does not have a cell phone per father. I will try to reach pt again at ~ 2p to discuss his sxs and figure out the correct therapy/plan. If there is a better time for pt to talk, he could call the office and let us know when that would be.

## 2020-04-01 NOTE — TELEPHONE ENCOUNTER
S: I called pt and spoke with him about several conditions:    1) Tooth pain in left upper molar and gum swelling/irritation at the base. Felt like a little ball formed there, is a bit less prominent today. Reports the tooth is hollowed out by cavities. He had 2 teeth pulled within the last month, took amoxicillin after those extractions and did well without SEs or rxn. Is using \"red cross\" solution on cotton ball for the pain with good relief. He denies fever, chills, nausea, dizziness. 2) Hypothyroidism: denies sxs of high or low thyroid, no tremors, fatigue, confusion, GI sxs. Knows he needs blood work, but is very nervous about coming in to the office given his h/o DM and he lives with his [de-identified] yo parents. 3) DMII: not checking sugars recently, but denies sxs of high or low sugar. He has glucometer at home, needs to find it. Has been washing his hands often, only goes out of the house for necessities in light of covid19 outbreak. O: Pt speaking in full sentences, NAD. A/P:   1) Dental caries/gum abscess: No systemic sxs, tolerated amox in the past. Will send rx for amoxicillin and pt knows to call us of sxs not improving as may need augmentin or clinda for anaerbic coverage    2) Hypothyroid: Has been nearly 2 yrs since last labs and have been working on getting pt in for labs for a long time, now in covid outbreak and pt unable to come in for labs, will continue for now and plan for lab check in 4-6 weeks    3) DMII: Pt to use home glucometer to get 3 readings per week over the next 3 weeks and we will set up virtual/phone appt in 3 weeks to address his sugar control and med management.  Continue glipizide 5mg daily and metformin 1000mg bid for now    Routing to PSR to make virtual visit appt in 3 weeks with me if possible and lab appt for 4-6 weeks, thank you

## 2020-04-20 DIAGNOSIS — E03.9 ACQUIRED HYPOTHYROIDISM: ICD-10-CM

## 2020-04-20 DIAGNOSIS — K04.7 DENTAL ABSCESS: ICD-10-CM

## 2020-04-20 DIAGNOSIS — E11.9 TYPE 2 DIABETES MELLITUS WITHOUT COMPLICATION, WITHOUT LONG-TERM CURRENT USE OF INSULIN (HCC): Primary | ICD-10-CM

## 2020-04-21 RX ORDER — AMOXICILLIN 500 MG/1
500 CAPSULE ORAL 3 TIMES DAILY
Qty: 30 CAP | Refills: 0 | OUTPATIENT
Start: 2020-04-21 | End: 2020-05-01

## 2020-04-21 RX ORDER — LEVOTHYROXINE SODIUM 150 UG/1
300 TABLET ORAL
Qty: 60 TAB | Refills: 0 | OUTPATIENT
Start: 2020-04-21

## 2020-04-21 NOTE — TELEPHONE ENCOUNTER
Called pharmacy, informed them that patient needs labs done prior to refill. Please let me know if you want to refill without labs and I will pend medication to you.

## 2020-04-21 NOTE — TELEPHONE ENCOUNTER
It has been 2 yrs since pt's last labs  I have made many exceptions maru lately in light of covid19 and his living situation with elderly parents. I think it is not safe at this point to continue refilling his high dose synthroid without checking his TSH. Also, he should set up a telemedicine or phone appt if he has continued dental symptoms. I cannot refill the abx after he just completed 10 day course. He should see a dentist as well. Nurse to pls work with pt to make 8am lab appt and reassure him of the division of care in [facility] with our clinic being a \"well or green clinic\" and the steps we are taking to prevent any potential exposure to covid19 among our pts and staff. I am reordering his labs from last October and including phlebotomist here so she can expect the pt.      Thank you, INDIRA

## 2020-04-22 ENCOUNTER — TELEPHONE (OUTPATIENT)
Dept: FAMILY MEDICINE CLINIC | Age: 50
End: 2020-04-22

## 2020-04-22 NOTE — TELEPHONE ENCOUNTER
Per Dr. Altagracia Noriega, no refill on Levothyroxine until patient has had labs done. Orders are in. Needs lab appointment.

## 2020-04-22 NOTE — TELEPHONE ENCOUNTER
----- Message from Dalia Adams sent at 4/21/2020  2:42 PM EDT -----  Regarding: Dr. Jluis Medina (if not patient):      Relationship of caller (if not patient):      Best contact number(s):[phone]      Name of medication and dosage if known:      Is patient out of this medication (yes/no):yes      Pharmacy name:[facility] in [facility] listed in chart? (yes/no):yes  Pharmacy phone number: [phone]      Details to clarify the request:refill of Levothyroxine      Dalia Beedorinda

## 2020-04-24 ENCOUNTER — LAB ONLY (OUTPATIENT)
Dept: FAMILY MEDICINE CLINIC | Age: 50
End: 2020-04-24

## 2020-04-24 DIAGNOSIS — E11.9 TYPE 2 DIABETES MELLITUS WITHOUT COMPLICATION, WITHOUT LONG-TERM CURRENT USE OF INSULIN (HCC): ICD-10-CM

## 2020-04-24 DIAGNOSIS — K04.7 DENTAL ABSCESS: ICD-10-CM

## 2020-04-24 DIAGNOSIS — E03.9 ACQUIRED HYPOTHYROIDISM: ICD-10-CM

## 2020-04-27 ENCOUNTER — TELEPHONE (OUTPATIENT)
Dept: FAMILY MEDICINE CLINIC | Age: 50
End: 2020-04-27

## 2020-04-27 DIAGNOSIS — E03.9 ACQUIRED HYPOTHYROIDISM: ICD-10-CM

## 2020-04-27 LAB
ALBUMIN SERPL-MCNC: 4.3 G/DL (ref 3.8–4.9)
ALBUMIN/CREAT UR: 9 MG/G CREAT (ref 0–29)
ALBUMIN/GLOB SERPL: 1.7 {RATIO} (ref 1.2–2.2)
ALP SERPL-CCNC: 88 IU/L (ref 39–117)
ALT SERPL-CCNC: 30 IU/L (ref 0–44)
AST SERPL-CCNC: 23 IU/L (ref 0–40)
BASOPHILS # BLD AUTO: 0.1 X10E3/UL (ref 0–0.2)
BASOPHILS NFR BLD AUTO: 1 %
BILIRUB SERPL-MCNC: 0.9 MG/DL (ref 0–1.2)
BUN SERPL-MCNC: 7 MG/DL (ref 6–24)
BUN/CREAT SERPL: 9 (ref 9–20)
CALCIUM SERPL-MCNC: 9.3 MG/DL (ref 8.7–10.2)
CHLORIDE SERPL-SCNC: 97 MMOL/L (ref 96–106)
CHOLEST SERPL-MCNC: 92 MG/DL (ref 100–199)
CO2 SERPL-SCNC: 18 MMOL/L (ref 20–29)
CREAT SERPL-MCNC: 0.8 MG/DL (ref 0.76–1.27)
CREAT UR-MCNC: 166.8 MG/DL
EOSINOPHIL # BLD AUTO: 0.3 X10E3/UL (ref 0–0.4)
EOSINOPHIL NFR BLD AUTO: 4 %
ERYTHROCYTE [DISTWIDTH] IN BLOOD BY AUTOMATED COUNT: 12 % (ref 11.6–15.4)
EST. AVERAGE GLUCOSE BLD GHB EST-MCNC: 301 MG/DL
GLOBULIN SER CALC-MCNC: 2.6 G/DL (ref 1.5–4.5)
GLUCOSE SERPL-MCNC: 361 MG/DL (ref 65–99)
HBA1C MFR BLD: 12.1 % (ref 4.8–5.6)
HCT VFR BLD AUTO: 45.9 % (ref 37.5–51)
HDLC SERPL-MCNC: 24 MG/DL
HGB BLD-MCNC: 16.4 G/DL (ref 13–17.7)
IMM GRANULOCYTES # BLD AUTO: 0 X10E3/UL (ref 0–0.1)
IMM GRANULOCYTES NFR BLD AUTO: 0 %
INTERPRETATION, 910389: NORMAL
LDLC SERPL CALC-MCNC: 5 MG/DL (ref 0–99)
LYMPHOCYTES # BLD AUTO: 3.2 X10E3/UL (ref 0.7–3.1)
LYMPHOCYTES NFR BLD AUTO: 44 %
Lab: NORMAL
MCH RBC QN AUTO: 30.4 PG (ref 26.6–33)
MCHC RBC AUTO-ENTMCNC: 35.7 G/DL (ref 31.5–35.7)
MCV RBC AUTO: 85 FL (ref 79–97)
MICROALBUMIN UR-MCNC: 14.8 UG/ML
MONOCYTES # BLD AUTO: 0.5 X10E3/UL (ref 0.1–0.9)
MONOCYTES NFR BLD AUTO: 7 %
NEUTROPHILS # BLD AUTO: 3.2 X10E3/UL (ref 1.4–7)
NEUTROPHILS NFR BLD AUTO: 44 %
PDF IMAGE, 910387: NORMAL
PLATELET # BLD AUTO: 162 X10E3/UL (ref 150–450)
POTASSIUM SERPL-SCNC: 4.2 MMOL/L (ref 3.5–5.2)
PROT SERPL-MCNC: 6.9 G/DL (ref 6–8.5)
RBC # BLD AUTO: 5.39 X10E6/UL (ref 4.14–5.8)
SODIUM SERPL-SCNC: 139 MMOL/L (ref 134–144)
TRIGL SERPL-MCNC: 315 MG/DL (ref 0–149)
TSH SERPL DL<=0.005 MIU/L-ACNC: 1.06 UIU/ML (ref 0.45–4.5)
VLDLC SERPL CALC-MCNC: 63 MG/DL (ref 5–40)
WBC # BLD AUTO: 7.4 X10E3/UL (ref 3.4–10.8)

## 2020-04-27 RX ORDER — GLIPIZIDE 10 MG/1
10 TABLET, FILM COATED, EXTENDED RELEASE ORAL DAILY
Qty: 30 TAB | Refills: 2 | Status: SHIPPED | OUTPATIENT
Start: 2020-04-27 | End: 2020-07-29 | Stop reason: SDUPTHER

## 2020-04-27 RX ORDER — HYDROXYZINE 50 MG/1
100 TABLET, FILM COATED ORAL
Qty: 60 TAB | Refills: 1 | Status: SHIPPED | OUTPATIENT
Start: 2020-04-27 | End: 2020-05-27 | Stop reason: SDUPTHER

## 2020-04-27 RX ORDER — GLIPIZIDE 5 MG/1
5 TABLET, FILM COATED, EXTENDED RELEASE ORAL DAILY
Qty: 30 TAB | Refills: 0 | OUTPATIENT
Start: 2020-04-27

## 2020-04-27 RX ORDER — LEVOTHYROXINE SODIUM 150 UG/1
300 TABLET ORAL
Qty: 60 TAB | Refills: 5 | Status: SHIPPED | OUTPATIENT
Start: 2020-04-27 | End: 2020-12-22 | Stop reason: SDUPTHER

## 2020-04-27 NOTE — TELEPHONE ENCOUNTER
Spoke with patient, informed him that refill requests have been sent to Dr. Elizabeth Ding. Lab results in. Will send Dr. Elizabeth Ding a message.

## 2020-04-27 NOTE — PROGRESS NOTES
Metabolic panel - normal kidney function and electrolytes  Urine protein/cr low, no sign of kidney damage from diabetes   A1c is very high at 12.1% which correlated with avg glucose of 300.  Recommend pt increase his glipizide from 5 to 10 mg daily   Blood counts normal   Thyroid lab normal, continue current levothyroxine dose, rx sent   Routing to nurse to pls inform pt of above, thanks

## 2020-04-27 NOTE — TELEPHONE ENCOUNTER
Patient is calling in to check the status of his Rx states he's been out for 4 days now and stated he came in recently and did blood work    Please advise thanks

## 2020-04-30 DIAGNOSIS — K21.9 GASTROESOPHAGEAL REFLUX DISEASE WITHOUT ESOPHAGITIS: ICD-10-CM

## 2020-05-04 RX ORDER — OMEPRAZOLE 40 MG/1
CAPSULE, DELAYED RELEASE ORAL
Qty: 90 CAP | Refills: 0 | Status: SHIPPED | OUTPATIENT
Start: 2020-05-04 | End: 2021-01-22 | Stop reason: SDUPTHER

## 2020-05-11 ENCOUNTER — TELEPHONE (OUTPATIENT)
Dept: FAMILY MEDICINE CLINIC | Age: 50
End: 2020-05-11

## 2020-05-11 NOTE — TELEPHONE ENCOUNTER
[phone]    Call was transferred from Shayla Noriega to say the patient is having left side weird sensation; it is not pain. .    Patient said had diverticulitis in the past.    He is now laying down to rest but is available to speak with.

## 2020-05-29 RX ORDER — HYDROXYZINE 50 MG/1
100 TABLET, FILM COATED ORAL
Qty: 60 TAB | Refills: 1 | Status: SHIPPED | OUTPATIENT
Start: 2020-05-29 | End: 2020-06-30 | Stop reason: SDUPTHER

## 2020-07-04 RX ORDER — HYDROXYZINE 50 MG/1
100 TABLET, FILM COATED ORAL
Qty: 60 TAB | Refills: 1 | Status: SHIPPED | OUTPATIENT
Start: 2020-07-04 | End: 2020-08-08 | Stop reason: SDUPTHER

## 2020-07-13 DIAGNOSIS — E78.5 HYPERLIPIDEMIA, UNSPECIFIED HYPERLIPIDEMIA TYPE: ICD-10-CM

## 2020-07-14 RX ORDER — ATORVASTATIN CALCIUM 80 MG/1
TABLET, FILM COATED ORAL
Qty: 90 TAB | Refills: 0 | Status: SHIPPED | OUTPATIENT
Start: 2020-07-14 | End: 2020-10-05

## 2020-07-16 DIAGNOSIS — F41.1 GAD (GENERALIZED ANXIETY DISORDER): ICD-10-CM

## 2020-07-21 ENCOUNTER — TELEPHONE (OUTPATIENT)
Dept: FAMILY MEDICINE CLINIC | Age: 50
End: 2020-07-21

## 2020-07-21 RX ORDER — BUSPIRONE HYDROCHLORIDE 10 MG/1
TABLET ORAL
Qty: 180 TAB | Refills: 0 | Status: SHIPPED | OUTPATIENT
Start: 2020-07-21 | End: 2020-09-23

## 2020-07-21 NOTE — TELEPHONE ENCOUNTER
Patient is upset about his med not being refilled (Buspirone 10 mg). ... He said that this has been called in for a week & its not filled yet. ... He is completely out of the med. ... Patient is very upset about this & he is angry that he goes through this every month he ask for a refill. ..

## 2020-07-30 RX ORDER — GLIPIZIDE 10 MG/1
10 TABLET, FILM COATED, EXTENDED RELEASE ORAL DAILY
Qty: 30 TAB | Refills: 0 | Status: SHIPPED | OUTPATIENT
Start: 2020-07-30 | End: 2020-09-14 | Stop reason: SDUPTHER

## 2020-08-06 NOTE — TELEPHONE ENCOUNTER
DR FRANZ  /  REFILL   Received: Today   Message Contents   Lydia Hawkins [facility] Front Office               General Message/Vendor Calls     \"HYDROXYZINE \"     To be called into [facility] DR CODY HOWARD listed in chart.        Callback required   Best contact number(s):[phone]

## 2020-08-08 RX ORDER — HYDROXYZINE 50 MG/1
TABLET, FILM COATED ORAL
Qty: 60 TAB | Refills: 0 | Status: SHIPPED | OUTPATIENT
Start: 2020-08-08 | End: 2020-08-26 | Stop reason: SDUPTHER

## 2020-08-10 DIAGNOSIS — I10 ESSENTIAL HYPERTENSION: ICD-10-CM

## 2020-08-12 RX ORDER — BISOPROLOL FUMARATE AND HYDROCHLOROTHIAZIDE 5; 6.25 MG/1; MG/1
TABLET ORAL
Qty: 90 TAB | Refills: 0 | Status: SHIPPED | OUTPATIENT
Start: 2020-08-12 | End: 2020-11-06

## 2020-08-22 DIAGNOSIS — F41.1 GAD (GENERALIZED ANXIETY DISORDER): Primary | ICD-10-CM

## 2020-08-25 RX ORDER — HYDROXYZINE 50 MG/1
TABLET, FILM COATED ORAL
Qty: 60 TAB | Refills: 0 | OUTPATIENT
Start: 2020-08-25

## 2020-08-26 ENCOUNTER — TELEPHONE (OUTPATIENT)
Dept: FAMILY MEDICINE CLINIC | Age: 50
End: 2020-08-26

## 2020-08-26 RX ORDER — HYDROXYZINE 50 MG/1
TABLET, FILM COATED ORAL
Qty: 90 TAB | Refills: 1 | Status: SHIPPED | OUTPATIENT
Start: 2020-08-26 | End: 2020-10-09

## 2020-08-26 NOTE — TELEPHONE ENCOUNTER
HydrOXYzine 50 mg... Patient want to know why this has been denied? Will you call him to explain. ... He states he has to go thru this every month. ...

## 2020-08-26 NOTE — TELEPHONE ENCOUNTER
I called [address] to see what was going on with the HydrOXYzine 50 mg. They stated they are actually working on it and it would be ready in 30 minutes. I called patient to let him know. He is aware.

## 2020-08-28 RX ORDER — GLIPIZIDE 10 MG/1
TABLET, FILM COATED, EXTENDED RELEASE ORAL
Qty: 30 TAB | Refills: 0 | OUTPATIENT
Start: 2020-08-28

## 2020-09-14 DIAGNOSIS — E11.9 TYPE 2 DIABETES MELLITUS WITHOUT COMPLICATION, WITHOUT LONG-TERM CURRENT USE OF INSULIN (HCC): Primary | ICD-10-CM

## 2020-09-14 RX ORDER — GLIPIZIDE 10 MG/1
10 TABLET, FILM COATED, EXTENDED RELEASE ORAL DAILY
Qty: 30 TAB | Refills: 0 | Status: SHIPPED | OUTPATIENT
Start: 2020-09-14 | End: 2020-10-14

## 2020-09-15 DIAGNOSIS — F41.1 GAD (GENERALIZED ANXIETY DISORDER): ICD-10-CM

## 2020-09-16 ENCOUNTER — PATIENT MESSAGE (OUTPATIENT)
Dept: FAMILY MEDICINE CLINIC | Age: 50
End: 2020-09-16

## 2020-09-16 NOTE — TELEPHONE ENCOUNTER
----- Message from Laurence Guzman sent at 9/15/2020 12:45 PM EDT -----  Regarding: DR Gerardo Tenorio / Maggy Schroeder Message/Vendor Calls        Pt is requesting to speak with nurse in regard to rescheduling lab appt canceled for 9/16/20  @ 10 am            Callback required   Best contact number(s):  [phone]                Laurence Guzman

## 2020-09-22 NOTE — TELEPHONE ENCOUNTER
Dr. Selene Zacarias / refill   Received: Yesterday   Message Contents   [place], [address] (if not patient): Self   Relationship of caller (if not patient):   Best contact number(s): [phone]   Name of medication and dosage if known: \"Busparone\" 10mg, \"Bupropione\" 150mg   Is patient out of this medication (yes/no): No, 1 day remaining   Pharmacy name: [facility] on [address] listed in chart? (yes/no): Yes   Pharmacy phone [phone]   Date of last visit: 09/16/2020   Details to clarify the request: Patient called requesting refill for  \"Busparone\" 10mg, \"Bupropione\" 150mg, patient states he has already contacted pharmacy, and pharmacy stated that they have sent a refill request with no response. Patient requests a callback with update.

## 2020-09-23 RX ORDER — BUSPIRONE HYDROCHLORIDE 10 MG/1
TABLET ORAL
Qty: 180 TAB | Refills: 0 | Status: SHIPPED | OUTPATIENT
Start: 2020-09-23 | End: 2020-12-11 | Stop reason: CLARIF

## 2020-10-01 DIAGNOSIS — E11.9 TYPE 2 DIABETES MELLITUS WITHOUT COMPLICATION, WITHOUT LONG-TERM CURRENT USE OF INSULIN (HCC): ICD-10-CM

## 2020-10-01 DIAGNOSIS — E78.5 HYPERLIPIDEMIA, UNSPECIFIED HYPERLIPIDEMIA TYPE: ICD-10-CM

## 2020-10-02 DIAGNOSIS — F41.8 DEPRESSION WITH ANXIETY: ICD-10-CM

## 2020-10-05 RX ORDER — BUPROPION HYDROCHLORIDE 150 MG/1
150 TABLET, EXTENDED RELEASE ORAL DAILY
Qty: 90 TAB | Refills: 3 | Status: SHIPPED | OUTPATIENT
Start: 2020-10-05 | End: 2020-12-11 | Stop reason: SDUPTHER

## 2020-10-05 RX ORDER — ATORVASTATIN CALCIUM 80 MG/1
TABLET, FILM COATED ORAL
Qty: 90 TAB | Refills: 0 | Status: SHIPPED | OUTPATIENT
Start: 2020-10-05 | End: 2021-01-10

## 2020-10-05 RX ORDER — METFORMIN HYDROCHLORIDE 1000 MG/1
1000 TABLET ORAL 2 TIMES DAILY
Qty: 180 TAB | Refills: 3 | Status: SHIPPED | OUTPATIENT
Start: 2020-10-05 | End: 2021-11-19

## 2020-10-08 DIAGNOSIS — F41.1 GAD (GENERALIZED ANXIETY DISORDER): ICD-10-CM

## 2020-10-09 RX ORDER — HYDROXYZINE 50 MG/1
TABLET, FILM COATED ORAL
Qty: 90 TAB | Refills: 0 | Status: SHIPPED | OUTPATIENT
Start: 2020-10-09 | End: 2020-11-06

## 2020-10-12 ENCOUNTER — LAB ONLY (OUTPATIENT)
Dept: FAMILY MEDICINE CLINIC | Age: 50
End: 2020-10-12

## 2020-10-12 DIAGNOSIS — E11.9 TYPE 2 DIABETES MELLITUS WITHOUT COMPLICATION, WITHOUT LONG-TERM CURRENT USE OF INSULIN (HCC): Primary | ICD-10-CM

## 2020-10-12 DIAGNOSIS — E78.5 HYPERLIPIDEMIA, UNSPECIFIED HYPERLIPIDEMIA TYPE: ICD-10-CM

## 2020-10-12 DIAGNOSIS — I10 ESSENTIAL HYPERTENSION: ICD-10-CM

## 2020-10-12 LAB
ANION GAP SERPL CALC-SCNC: 8 MMOL/L (ref 5–15)
BUN SERPL-MCNC: 10 MG/DL (ref 6–20)
BUN/CREAT SERPL: 12 (ref 12–20)
CALCIUM SERPL-MCNC: 8.7 MG/DL (ref 8.5–10.1)
CHLORIDE SERPL-SCNC: 101 MMOL/L (ref 97–108)
CO2 SERPL-SCNC: 28 MMOL/L (ref 21–32)
CREAT SERPL-MCNC: 0.86 MG/DL (ref 0.7–1.3)
EST. AVERAGE GLUCOSE BLD GHB EST-MCNC: 183 MG/DL
GLUCOSE SERPL-MCNC: 254 MG/DL (ref 65–100)
HBA1C MFR BLD: 8 % (ref 4–5.6)
POTASSIUM SERPL-SCNC: 4 MMOL/L (ref 3.5–5.1)
SODIUM SERPL-SCNC: 137 MMOL/L (ref 136–145)

## 2020-10-14 DIAGNOSIS — E11.9 TYPE 2 DIABETES MELLITUS WITHOUT COMPLICATION, WITHOUT LONG-TERM CURRENT USE OF INSULIN (HCC): ICD-10-CM

## 2020-10-14 RX ORDER — GLIPIZIDE 10 MG/1
TABLET, FILM COATED, EXTENDED RELEASE ORAL
Qty: 30 TAB | Refills: 0 | Status: SHIPPED | OUTPATIENT
Start: 2020-10-14 | End: 2020-11-06

## 2020-10-15 ENCOUNTER — TELEPHONE (OUTPATIENT)
Dept: FAMILY MEDICINE CLINIC | Age: 50
End: 2020-10-15

## 2020-10-15 NOTE — TELEPHONE ENCOUNTER
----- Message from Elle Reeves sent at 10/15/2020 10:25 AM EDT -----  Regarding: DR Jorge Galindo / Nils Keating Message/Vendor Calls    Pt is requesting to speak with nurse in regard to test results.        Callback required   Best contact number(s):  [phone]        Elle Reeves

## 2020-10-15 NOTE — TELEPHONE ENCOUNTER
Returned patients call in regards to his lab results. Patient really just wanted to make sure his glipizide was sent to pharmacy, which I told him it has. Patient verbalized understanding. Of note when I was talking to patient he mentioned he had been randomly having some chest discomfort. He currently was not having it while I was talking to him. I told him that if it returns he needs to seek medical attention asap. Patient understood and will go to the hospital if he gets chest pains again.

## 2020-11-05 DIAGNOSIS — F41.1 GAD (GENERALIZED ANXIETY DISORDER): ICD-10-CM

## 2020-11-05 DIAGNOSIS — E11.9 TYPE 2 DIABETES MELLITUS WITHOUT COMPLICATION, WITHOUT LONG-TERM CURRENT USE OF INSULIN (HCC): ICD-10-CM

## 2020-11-05 DIAGNOSIS — I10 ESSENTIAL HYPERTENSION: ICD-10-CM

## 2020-11-05 RX ORDER — HYDROXYZINE 50 MG/1
TABLET, FILM COATED ORAL
Qty: 90 TAB | Refills: 0 | OUTPATIENT
Start: 2020-11-05

## 2020-11-05 NOTE — TELEPHONE ENCOUNTER
I was covering for Dr. Los Torres who prescribed this patient Hydroxyzine. I have never seen the patient. I just refilled 90 tablets. If he is out of medication, he needs to be seen for anxiety for possible worsening of symptoms. Will route to front office to make appt.

## 2020-11-06 RX ORDER — BISOPROLOL FUMARATE AND HYDROCHLOROTHIAZIDE 5; 6.25 MG/1; MG/1
TABLET ORAL
Qty: 90 TAB | Refills: 0 | Status: SHIPPED | OUTPATIENT
Start: 2020-11-06 | End: 2021-02-08

## 2020-11-06 RX ORDER — GLIPIZIDE 10 MG/1
TABLET, FILM COATED, EXTENDED RELEASE ORAL
Qty: 30 TAB | Refills: 0 | Status: SHIPPED | OUTPATIENT
Start: 2020-11-06 | End: 2020-12-07

## 2020-11-06 NOTE — TELEPHONE ENCOUNTER
Spoke with the patient. He said the doctor did not order a 90 day supply as he takes 2 pills , two times a day, which is more than #90 to begin with. Said the doctor did not even give him a 30 day supply. He said he needs the medication filled. No appointment was made during the phone conversation. Dr. Allie Slater / Refill   Received: Today   Message Contents   Tawanda Bello [facility] Front Office               Medication Refill     Caller (if not patient): self     Relationship of caller (if not patient): self     Best contact number(s): [phone], please call to confirm prescription will be sent. Name of medication and dosage if known: hydrOXYzine HCL     Is patient out of this medication (yes/no): Yes     Pharmacy name: [place] listed in chart? (yes/no): Yes     Pharmacy phone number: [phone]     Date of last visit: 09/16/20     Details to clarify the request: Pt adjusting dosage to ensure he doesn't run out, but now he's out and he's a bit upset because he originally requested about 8 days ago.

## 2020-11-14 DIAGNOSIS — F41.1 GAD (GENERALIZED ANXIETY DISORDER): ICD-10-CM

## 2020-11-15 RX ORDER — HYDROXYZINE 50 MG/1
TABLET, FILM COATED ORAL
Qty: 45 TAB | Refills: 0 | Status: SHIPPED | OUTPATIENT
Start: 2020-11-15 | End: 2020-11-25 | Stop reason: SDUPTHER

## 2020-11-25 DIAGNOSIS — F41.1 GAD (GENERALIZED ANXIETY DISORDER): ICD-10-CM

## 2020-11-25 RX ORDER — HYDROXYZINE 50 MG/1
TABLET, FILM COATED ORAL
Qty: 45 TAB | Refills: 0 | Status: SHIPPED | OUTPATIENT
Start: 2020-11-25 | End: 2020-12-11 | Stop reason: DRUGHIGH

## 2020-12-06 DIAGNOSIS — E11.9 TYPE 2 DIABETES MELLITUS WITHOUT COMPLICATION, WITHOUT LONG-TERM CURRENT USE OF INSULIN (HCC): ICD-10-CM

## 2020-12-07 RX ORDER — GLIPIZIDE 10 MG/1
TABLET, FILM COATED, EXTENDED RELEASE ORAL
Qty: 30 TAB | Refills: 0 | Status: SHIPPED | OUTPATIENT
Start: 2020-12-07 | End: 2021-01-08

## 2020-12-08 DIAGNOSIS — F41.1 GAD (GENERALIZED ANXIETY DISORDER): ICD-10-CM

## 2020-12-08 RX ORDER — HYDROXYZINE 50 MG/1
TABLET, FILM COATED ORAL
Qty: 45 TAB | Refills: 0 | OUTPATIENT
Start: 2020-12-08

## 2020-12-11 ENCOUNTER — VIRTUAL VISIT (OUTPATIENT)
Dept: FAMILY MEDICINE CLINIC | Age: 50
End: 2020-12-11

## 2020-12-11 DIAGNOSIS — F41.1 GAD (GENERALIZED ANXIETY DISORDER): Primary | ICD-10-CM

## 2020-12-11 PROCEDURE — 99443 PR PHYS/QHP TELEPHONE EVALUATION 21-30 MIN: CPT | Performed by: STUDENT IN AN ORGANIZED HEALTH CARE EDUCATION/TRAINING PROGRAM

## 2020-12-11 RX ORDER — BUPROPION HYDROCHLORIDE 150 MG/1
150 TABLET, EXTENDED RELEASE ORAL DAILY
Qty: 90 TAB | Refills: 3 | Status: SHIPPED | OUTPATIENT
Start: 2020-12-11 | End: 2022-02-01

## 2020-12-11 RX ORDER — HYDROXYZINE PAMOATE 100 MG/1
100 CAPSULE ORAL
Qty: 90 CAP | Refills: 2 | Status: SHIPPED | OUTPATIENT
Start: 2020-12-11 | End: 2020-12-15 | Stop reason: ALTCHOICE

## 2020-12-11 RX ORDER — BUSPIRONE HYDROCHLORIDE 15 MG/1
15 TABLET ORAL 2 TIMES DAILY
Qty: 90 TAB | Refills: 1 | Status: SHIPPED | OUTPATIENT
Start: 2020-12-11 | End: 2021-03-14

## 2020-12-11 NOTE — PROGRESS NOTES
Marley Butterfield.  44 y.o. male  1970  [facility] [address] [id number]  [id number]    [phone] (home)      [address]:    Telephone Encounter  Alaina Garcia [place]       Encounter Date: 12/11/2020 at 1:45 PM    Consent: Marley Daniel, who was seen by synchronous (real-time) audio only technology, and/or his healthcare decision maker, is aware that this patient-initiated, Telehealth encounter on 12/11/2020 is a billable service, with coverage as determined by his insurance carrier. He is aware that he may receive a bill and has provided verbal consent to proceed: Yes. Chief Complaint   Patient presents with    Anxiety    Medication Refill       History of Present Illness   Marley Daniel is a 44 y.o. male was evaluated by telephone. I communicated with the patient and/or health care decision maker about his medication refill. Anxiety  Currently on Wellbutrin 150mg daily, Buspar 10mg BID and Atarax prn. He feels like his anxiety is out of control. He always is on edge and it has effected his mood. His mother has dementia and it  has been a holcomb everyday. Sometimes they argue, other times he feels like if he doesn't keep an eye on her, something awful may happen. At night sometimes his heart races when he thinks about what's going on. He notes that before when he was on Xanax, he was able to control his symptoms more effectively. He would like to go back on that medication if possible. When it comes to support he is able to talk to his friends and his cousins but he still feels like he has a lot to deal with. He is not interested in counseling. He denies any SI/HI  GAD7:18    Review of Systems   Review of Systems   Constitutional: Negative for chills and fever. Respiratory: Negative for cough and shortness of breath. Psychiatric/Behavioral: Negative for depression, substance abuse and suicidal ideas.  The patient is nervous/anxious. Vitals/Objective:   General: Patient speaking in complete sentences without effort. Normal speech and cooperative. Due to this being a Virtual Check-in/Telephone evaluation, many elements of the physical examination are unable to be assessed. Assessment and Plan:   Time-based coding, delete if not needed: I spent at least 25 minutes with this established patient, and >50% of the time was spent counseling and/or coordinating care regarding his anxiety  Total Time: minutes: 21-30 minutes    1. JULIANNA (generalized anxiety disorder)  -Patient presents with severe anxiety. GAD7=18. Had a long discussion about the patient's stressors in life including family dynamics and his own health. -Patient experiences a lot of anxiety due to his mother's illness and his father not being very involved in her care  -Patient would like to go back on Xanax however we discussed other options instead.  -Will increase home Buspar to 15mg BID. Will so increase home Atarax  -Offered the option of counseling however patient politely declined  -Medication refills send below  -Will have patient follow up in 1-2 weeks to discuss his anxiety on higher dose  - busPIRone (BUSPAR) 15 mg tablet; Take 1 Tab by mouth two (2) times a day. Dispense: 90 Tab; Refill: 1  - hydrOXYzine pamoate (VISTARIL) 100 mg capsule; Take 1 Cap by mouth three (3) times daily as needed for Anxiety. Dispense: 90 Cap; Refill: 2  - buPROPion SR (WELLBUTRIN SR) 150 mg SR tablet; Take 1 Tab by mouth daily. Dispense: 90 Tab; Refill: 3        We discussed the expected course, resolution and complications of the diagnosis(es) in detail. Medication risks, benefits, costs, interactions, and alternatives were discussed as indicated. I advised him to contact the office if his condition worsens, changes or fails to improve as anticipated. He expressed understanding with the diagnosis(es) and plan.  Patient understands that this encounter was a temporary measure, and the importance of further follow up and examination was emphasized. Patient verbalized understanding. Patient informed to follow up: 2 weeks    I affirm this is a Patient Initiated Episode with an Established Patient who has not had a related appointment within my department in the past 7 days or scheduled within the next 24 hours. Note: not billable if this call serves to triage the patient into an appointment for the relevant concern      Electronically Signed: Diane Willard DO  Providers location when delivering service: Home    CPT:  06576 (5-10 minutes)  90689 (11-20 minutes)  21  (21-30 minutes)    Medicare:  [address]      ICD-10-CM ICD-9-CM    1. JULIANNA (generalized anxiety disorder)  F41.1 300.02 busPIRone (BUSPAR) 15 mg tablet      hydrOXYzine pamoate (VISTARIL) 100 mg capsule      buPROPion SR (WELLBUTRIN SR) 150 mg SR tablet       Pursuant to the emergency declaration under the [facility] waiver authority and the [employer] Act, this Virtual  Visit was conducted, with patient's consent, to reduce the patient's risk of exposure to COVID-19 and provide continuity of care for an established patient. History   Patients past medical, surgical and family histories were personally reviewed and updated.       Past Medical History:   Diagnosis Date    Anxiety     Chest pain     Diverticulosis     Hypothyroid 7/20/2010    Kidney stone 7/20/2010     Past Surgical History:   Procedure Laterality Date    HX HEENT  1982    jaw surgery     Family History   Problem Relation Age of Onset    Hypertension Mother     Heart Disease Mother         valve placement     Hypertension Father     Asthma Father      Social History     Socioeconomic History    Marital status: SINGLE     Spouse name: Not on file    Number of children: Not on file    Years of education: Not on file    Highest education level: Not on file   Occupational History    Not on file   Social Needs    Financial resource strain: Not on file    Food insecurity     Worry: Not on file     Inability: Not on file    Transportation needs     Medical: Not on file     Non-medical: Not on file   Tobacco Use    Smoking status: Current Every Day Smoker     Packs/day: 0.25     Years: 20.00     Pack years: 5.00     Types: Cigarettes    Smokeless tobacco: Former User     Types: Snuff   Substance and Sexual Activity    Alcohol use: No     Comment: occasionally once a month    Drug use: No     Comment: 1/2 pack a day    Sexual activity: Yes     Partners: Female   Lifestyle    Physical activity     Days per week: Not on file     Minutes per session: Not on file    Stress: Not on file   Relationships    Social connections     Talks on phone: Not on file     Gets together: Not on file     Attends Congregational service: Not on file     Active member of club or organization: Not on file     Attends meetings of clubs or organizations: Not on file     Relationship status: Not on file    Intimate partner violence     Fear of current or ex partner: Not on file     Emotionally abused: Not on file     Physically abused: Not on file     Forced sexual activity: Not on file   Other Topics Concern    Not on file   Social History Narrative    Not on file            Current Medications/Allergies   Medications and Allergies reviewed:    Current Outpatient Medications   Medication Sig Dispense Refill    busPIRone (BUSPAR) 15 mg tablet Take 1 Tab by mouth two (2) times a day. 90 Tab 1    hydrOXYzine pamoate (VISTARIL) 100 mg capsule Take 1 Cap by mouth three (3) times daily as needed for Anxiety. 90 Cap 2    buPROPion SR (WELLBUTRIN SR) 150 mg SR tablet Take 1 Tab by mouth daily.  90 Tab 3    glipiZIDE SR (GLUCOTROL XL) 10 mg CR tablet Take 1 tablet by mouth once daily 30 Tab 0    bisoprolol-hydroCHLOROthiazide (ZIAC) 5-6.25 mg per tablet Take 1 tablet by mouth once daily 90 Tab 0    atorvastatin (LIPITOR) 80 mg tablet Take 1 tablet by mouth nightly 90 Tab 0    metFORMIN (GLUCOPHAGE) 1,000 mg tablet Take 1 Tab by mouth two (2) times a day. 180 Tab 3    omeprazole (PRILOSEC) 40 mg capsule Take 1 capsule by mouth once daily 90 Cap 0    levothyroxine (SYNTHROID) 150 mcg tablet Take 2 Tabs by mouth Daily (before breakfast).  Needs labs prior to next refill 60 Tab 5    Blood-Glucose Meter monitoring kit Use once a day 1 Kit 0    Lancets misc Use once a day with glucometer 1 Each 11    glucose blood VI test strips (BLOOD GLUCOSE TEST) strip Use once a day with glucometer 100 Strip 1    alcohol swabs (ALCOHOL PADS) padm Use once a day when checking blood glucose readings 100 Pad 1     No Known Allergies

## 2020-12-11 NOTE — PROGRESS NOTES
Vero Alan. 
44 y.o. male 1970 
[address] [facility] [address] [id number] [address]:   
Telemedicine Progress Note Divine Nugent DO 
  
 
Encounter Date and Time: December 11, 2020 at 1:45 PM 
 
Consent: Vero Modi, who was seen by synchronous (real-time) audio-video technology, and/or his healthcare decision maker, is aware that this patient-initiated, Telehealth encounter on 12/11/2020 is a billable service, with coverage as determined by his insurance carrier. He is aware that he may receive a bill and has provided verbal consent to proceed: Yes. Chief Complaint Patient presents with  Anxiety  Medication Refill History of Present Illness Vero Modi is a 44 y.o. male was evaluated by synchronous (real-time) audio-video technology from home, through a secure patient portal. 
 
Anxiety Currently on Buspar Review of Systems ROS Vitals/Objective:  
 
General: {gen appear:90451::\"alert\",\"cooperative\",\"no distress\"} Mental  status: {mental status:065544::\"alert, oriented to person, place, and time\",\"normal mood, behavior, speech, dress, motor activity, and thought processes\":1} Resp: {resp:84576::\"normal effort\",\"no respiratory distress\":1} Neuro: {neuro:40480::\"no gross deficits\":1} Skin: {skin:480173::\"no discoloration or lesions of concern on visible areas\":1} Due to this being a TeleHealth evaluation, many elements of the physical examination are unable to be assessed. Assessment and Plan:  
{Time-based coding, delete if not needed:63033::\"I spent at least 15 minutes with this established patient, and >50% of the time was spent counseling and/or coordinating care regarding ***\"} There are no diagnoses linked to this encounter. Assessment/Plan:*** Time spent in direct conversation with the patient to include medical condition(s) discussed, assessment and treatment plan: We discussed the expected course, resolution and complications of the diagnosis(es) in detail. Medication risks, benefits, costs, interactions, and alternatives were discussed as indicated. I advised him to contact the office if his condition worsens, changes or fails to improve as anticipated. He expressed understanding with the diagnosis(es) and plan. Patient understands that this encounter was a temporary measure, and the importance of further follow up and examination was emphasized. Patient verbalized understanding. Patient informed to follow up: ***. Electronically Signed: Yareli Gloria DO 
 
CPT Codes 56331-86171 for Established Patients may apply to this Telehealth Visit. POS code: 18. Modifier LIZBETH Peterson is a 44 y.o. male who was evaluated by an {virtual platform audio-video vs audio only:86194::\"audio-video\"} encounter for concerns as above. Patient identification was verified prior to start of the visit. A caregiver was present when appropriate. Due to this being a TeleHealth encounter (During [address] emergency), evaluation of the following organ systems was limited: Vitals/Constitutional/EENT/Resp/CV/GI//MS/Neuro/Skin/Heme-Lymph-Imm. Pursuant to the emergency declaration under the [facility] waiver authority and the [employer] Act, this Virtual Visit was conducted, with patient's (and/or legal guardian's) consent, to reduce the patient's risk of exposure to COVID-19 and provide necessary medical care. Services were provided through a synchronous discussion virtually to substitute for in-person clinic visit. I was {location home office other:93990::\"at home\"}. The patient was {location home office other:16254::\"at home\"}. History Patients past medical, surgical and family histories were reviewed and updated. Past Medical History: Diagnosis Date  Anxiety  Chest pain  Diverticulosis  Hypothyroid 7/20/2010  Kidney stone 7/20/2010 Past Surgical History:  
Procedure Laterality Date  HX HEENT  1982  
 jaw surgery Family History Problem Relation Age of Onset  Hypertension Mother  Heart Disease Mother   
     valve placement  Hypertension Father  Asthma Father Social History Socioeconomic History  Marital status: SINGLE Spouse name: Not on file  Number of children: Not on file  Years of education: Not on file  Highest education level: Not on file Occupational History  Not on file Social Needs  Financial resource strain: Not on file  Food insecurity Worry: Not on file Inability: Not on file  Transportation needs Medical: Not on file Non-medical: Not on file Tobacco Use  Smoking status: Current Every Day Smoker Packs/day: 0.25 Years: 20.00 Pack years: 5.00 Types: Cigarettes  Smokeless tobacco: Former User Types: Snuff Substance and Sexual Activity  Alcohol use: No  
  Comment: occasionally once a month  Drug use: No  
  Comment: 1/2 pack a day  Sexual activity: Yes  
  Partners: Female Lifestyle  Physical activity Days per week: Not on file Minutes per session: Not on file  Stress: Not on file Relationships  Social connections Talks on phone: Not on file Gets together: Not on file Attends [employer] service: Not on file Active member of club or organization: Not on file Attends meetings of clubs or organizations: Not on file Relationship status: Not on file  Intimate partner violence Fear of current or ex partner: Not on file Emotionally abused: Not on file Physically abused: Not on file Forced sexual activity: Not on file Other Topics Concern  Not on file Social History Narrative  Not on file Patient Active Problem List  
 Diagnosis Code  Hypothyroid E03.9  Kidney stone N20.0  Anxiety disorder F41.9  Depression with anxiety F41.8  Lipid disorder E78.9  Smoker F17.200  
 HTN (hypertension) I10  
 TRACY on CPAP G47.33, Z99.89  
 T2DM (type 2 diabetes mellitus) ([facility] [address] E11.9  Poor dentition K08.9  Stress and adjustment reaction F43.29 Current Medications/Allergies Medications and Allergies reviewed: 
 
Current Outpatient Medications Medication Sig Dispense Refill  glipiZIDE SR (GLUCOTROL XL) 10 mg CR tablet Take 1 tablet by mouth once daily 30 Tab 0  
 hydrOXYzine HCL (ATARAX) 50 mg tablet TAKE 2 TABLETS BY MOUTH EVERY 6 HOURS AS NEEDED FOR ANXIETY 45 Tab 0  
 bisoprolol-hydroCHLOROthiazide (ZIAC) 5-6.25 mg per tablet Take 1 tablet by mouth once daily 90 Tab 0  
 atorvastatin (LIPITOR) 80 mg tablet Take 1 tablet by mouth nightly 90 Tab 0  
 metFORMIN (GLUCOPHAGE) 1,000 mg tablet Take 1 Tab by mouth two (2) times a day. 180 Tab 3  
 buPROPion SR (WELLBUTRIN SR) 150 mg SR tablet Take 1 Tab by mouth daily. 90 Tab 3  
 busPIRone (BUSPAR) 10 mg tablet Take 1 tablet by mouth twice daily 180 Tab 0  
 omeprazole (PRILOSEC) 40 mg capsule Take 1 capsule by mouth once daily 90 Cap 0  
 levothyroxine (SYNTHROID) 150 mcg tablet Take 2 Tabs by mouth Daily (before breakfast). Needs labs prior to next refill 60 Tab 5  Blood-Glucose Meter monitoring kit Use once a day 1 Kit 0  
 Lancets misc Use once a day with glucometer 1 Each 11  
 glucose blood VI test strips (BLOOD GLUCOSE TEST) strip Use once a day with glucometer 100 Strip 1  
 alcohol swabs (ALCOHOL PADS) padm Use once a day when checking blood glucose readings 100 Pad 1 No Known Allergies

## 2020-12-14 ENCOUNTER — TELEPHONE (OUTPATIENT)
Dept: FAMILY MEDICINE CLINIC | Age: 50
End: 2020-12-14

## 2020-12-14 NOTE — TELEPHONE ENCOUNTER
----- Message from Reola Dose sent at 12/14/2020  4:35 PM EST -----  Regarding: Dr. Brittney Ramirez first and last name: n/a  Reason for call: medication reaction  Callback required yes/no and why: yes  Best contact number(s):[phone]  Details to clarify the request:Mr. Mae is requesting a call from Dr. Deena Oneil due to a reaction of his new Rx \" Hydroxyzine Pamonte \" from 50mg to 100mg. Mr. Terri Richmond is requesting to go back to the original 50mg  HCL tablets. Mr. Terri Richmond states he doesn't have any of the lower dosage left.

## 2020-12-15 DIAGNOSIS — F41.1 GAD (GENERALIZED ANXIETY DISORDER): ICD-10-CM

## 2020-12-15 RX ORDER — HYDROXYZINE 50 MG/1
100 TABLET, FILM COATED ORAL
Qty: 90 TAB | Refills: 2 | Status: SHIPPED | OUTPATIENT
Start: 2020-12-15 | End: 2021-02-25

## 2020-12-16 NOTE — TELEPHONE ENCOUNTER
[phone]    Spoke with patient who was informed of new medication order. He will call back later for an appt in [facility]. Follow up appt  Received: 5 days ago  Message Contents   DO NAREN Olguin [facility] [address]     This patient will need a follow up in 1-2 weeks. It can be VV.  Thank you     JANELL

## 2020-12-21 ENCOUNTER — TELEPHONE (OUTPATIENT)
Dept: FAMILY MEDICINE CLINIC | Age: 50
End: 2020-12-21

## 2020-12-21 DIAGNOSIS — E03.9 ACQUIRED HYPOTHYROIDISM: ICD-10-CM

## 2020-12-21 RX ORDER — LEVOTHYROXINE SODIUM 150 UG/1
300 TABLET ORAL
Qty: 60 TAB | Refills: 5 | Status: CANCELLED | OUTPATIENT
Start: 2020-12-21

## 2020-12-21 NOTE — TELEPHONE ENCOUNTER
----- Message from Izabel Xiong sent at 12/21/2020  1:48 PM EST -----  Regarding: Dr. Deal/refill  Medication Refill    Caller (if not patient): pt      Relationship of caller (if not patient): pt      Best contact number(s): [phone]      Name of medication and dosage if known: \"levothyroxine\" 150 mg      Is patient out of this medication (yes/no): yes      Pharmacy name: [facility] listed in chart? (yes/no): yes  Pharmacy phone number: [phone]    Date of last visit: 12/11/20    Details to clarify the request: Pt states pharmacy and pt has been trying to obtain refill auth for a few weeks.        Izabel Xiong

## 2020-12-22 DIAGNOSIS — E03.9 ACQUIRED HYPOTHYROIDISM: ICD-10-CM

## 2020-12-22 RX ORDER — LEVOTHYROXINE SODIUM 150 UG/1
300 TABLET ORAL
Qty: 60 TAB | Refills: 5 | Status: SHIPPED | OUTPATIENT
Start: 2020-12-22 | End: 2021-08-11

## 2020-12-22 NOTE — TELEPHONE ENCOUNTER
[facility] DR CODY HOWARD calling, states they have sent request for refill of levothyroxine 150 mcg without response. Pt is out of medication. Asking this be addressed ASAP.      [phone]

## 2021-01-09 DIAGNOSIS — E78.5 HYPERLIPIDEMIA, UNSPECIFIED HYPERLIPIDEMIA TYPE: ICD-10-CM

## 2021-01-10 RX ORDER — ATORVASTATIN CALCIUM 80 MG/1
TABLET, FILM COATED ORAL
Qty: 90 TAB | Refills: 0 | Status: SHIPPED | OUTPATIENT
Start: 2021-01-10 | End: 2021-04-15

## 2021-01-20 DIAGNOSIS — K21.9 GASTROESOPHAGEAL REFLUX DISEASE WITHOUT ESOPHAGITIS: ICD-10-CM

## 2021-01-20 RX ORDER — OMEPRAZOLE 40 MG/1
CAPSULE, DELAYED RELEASE ORAL
Qty: 90 CAP | Refills: 0 | OUTPATIENT
Start: 2021-01-20

## 2021-01-22 DIAGNOSIS — K21.9 GASTROESOPHAGEAL REFLUX DISEASE WITHOUT ESOPHAGITIS: ICD-10-CM

## 2021-01-22 RX ORDER — OMEPRAZOLE 40 MG/1
CAPSULE, DELAYED RELEASE ORAL
Qty: 90 CAP | Refills: 0 | Status: SHIPPED | OUTPATIENT
Start: 2021-01-22 | End: 2021-07-17

## 2021-01-22 NOTE — TELEPHONE ENCOUNTER
Dr. Ange Russo telephone                          high priority  Received: 2 days ago  Message Contents   Neil [address] Message/Vendor Calls     Caller's first and last name: pt     Reason for call: pt states his pharmacy has sent several requests in two weeks for a refill of his Omeprazole medication. Pt states pharmacy has not received the response from practice as of yet. Pt expresses frustration and is requesting a refill today.      Callback required yes/no and why: yes     Best contact number(s): [phone]     Details to clarify the request: Pretty Pickens

## 2021-02-05 DIAGNOSIS — I10 ESSENTIAL HYPERTENSION: ICD-10-CM

## 2021-02-08 RX ORDER — BISOPROLOL FUMARATE AND HYDROCHLOROTHIAZIDE 5; 6.25 MG/1; MG/1
TABLET ORAL
Qty: 90 TAB | Refills: 0 | Status: SHIPPED | OUTPATIENT
Start: 2021-02-08 | End: 2021-05-12

## 2021-02-24 DIAGNOSIS — F41.1 GAD (GENERALIZED ANXIETY DISORDER): ICD-10-CM

## 2021-02-25 RX ORDER — HYDROXYZINE 50 MG/1
TABLET, FILM COATED ORAL
Qty: 90 TAB | Refills: 0 | Status: SHIPPED | OUTPATIENT
Start: 2021-02-25 | End: 2021-03-22

## 2021-03-14 DIAGNOSIS — F41.1 GAD (GENERALIZED ANXIETY DISORDER): ICD-10-CM

## 2021-03-14 RX ORDER — BUSPIRONE HYDROCHLORIDE 15 MG/1
TABLET ORAL
Qty: 90 TAB | Refills: 0 | Status: SHIPPED | OUTPATIENT
Start: 2021-03-14 | End: 2021-05-10

## 2021-03-21 DIAGNOSIS — F41.1 GAD (GENERALIZED ANXIETY DISORDER): ICD-10-CM

## 2021-03-22 RX ORDER — HYDROXYZINE 50 MG/1
TABLET, FILM COATED ORAL
Qty: 90 TAB | Refills: 0 | Status: SHIPPED | OUTPATIENT
Start: 2021-03-22 | End: 2021-04-15

## 2021-04-15 DIAGNOSIS — E78.5 HYPERLIPIDEMIA, UNSPECIFIED HYPERLIPIDEMIA TYPE: ICD-10-CM

## 2021-04-15 DIAGNOSIS — F41.1 GAD (GENERALIZED ANXIETY DISORDER): ICD-10-CM

## 2021-04-15 RX ORDER — HYDROXYZINE 50 MG/1
TABLET, FILM COATED ORAL
Qty: 90 TAB | Refills: 0 | Status: SHIPPED | OUTPATIENT
Start: 2021-04-15 | End: 2021-05-10

## 2021-04-15 RX ORDER — ATORVASTATIN CALCIUM 80 MG/1
TABLET, FILM COATED ORAL
Qty: 90 TAB | Refills: 0 | Status: SHIPPED | OUTPATIENT
Start: 2021-04-15 | End: 2021-07-14

## 2021-05-07 DIAGNOSIS — I10 ESSENTIAL HYPERTENSION: ICD-10-CM

## 2021-05-07 DIAGNOSIS — F41.1 GAD (GENERALIZED ANXIETY DISORDER): ICD-10-CM

## 2021-05-07 RX ORDER — BISOPROLOL FUMARATE AND HYDROCHLOROTHIAZIDE 5; 6.25 MG/1; MG/1
TABLET ORAL
Qty: 90 TAB | Refills: 0 | OUTPATIENT
Start: 2021-05-07

## 2021-05-07 RX ORDER — HYDROXYZINE 50 MG/1
TABLET, FILM COATED ORAL
Qty: 90 TAB | Refills: 0 | OUTPATIENT
Start: 2021-05-07

## 2021-05-07 RX ORDER — BUSPIRONE HYDROCHLORIDE 15 MG/1
TABLET ORAL
Qty: 90 TAB | Refills: 0 | OUTPATIENT
Start: 2021-05-07

## 2021-05-10 DIAGNOSIS — F41.1 GAD (GENERALIZED ANXIETY DISORDER): ICD-10-CM

## 2021-05-10 RX ORDER — HYDROXYZINE 50 MG/1
TABLET, FILM COATED ORAL
Qty: 90 TAB | Refills: 0 | Status: SHIPPED | OUTPATIENT
Start: 2021-05-10 | End: 2021-06-05

## 2021-05-10 RX ORDER — BUSPIRONE HYDROCHLORIDE 15 MG/1
TABLET ORAL
Qty: 90 TAB | Refills: 0 | Status: SHIPPED | OUTPATIENT
Start: 2021-05-10 | End: 2021-06-28

## 2021-05-10 NOTE — TELEPHONE ENCOUNTER
Spoke with patient. Jewel Chimera he will run out of medication in 2 days. Please explain the doctors reason to the patient. He doesn't understand.       hydrOXYzine HCL (ATARAX) 50 mg tablet [Pharmacy Med Name: hydrOXYzine HCl 50 MG Oral Tablet] 90 Tab 0 5/7/2021     Request refused: Patient has requested refill too soon    Sig: TAKE 2 TABLETS BY MOUTH EVERY 6 HOURS AS NEEDED FOR ANXIETY

## 2021-05-10 NOTE — TELEPHONE ENCOUNTER
Good Afternoon Dr.Jean cherry,  This patient was requesting medication refills. bisoprolol-hydroCHLOROthiazide (ZIAC) 5-6.25 mg per tablet  This medication was last prescribed on 02/08/2021 for a 90 day supply. It looks like it was denied but technically they are due for it.     hydrOXYzine HCL (ATARAX) 50 mg tablet  This medication was prescribed on 04/15/2021. It looks like patient takes it twice a day. You had prescribed 90 tabs, so he should be running out soon. Can you help me with this? Patient is also questioning on why these medications were denied.      Thank you

## 2021-06-04 DIAGNOSIS — F41.1 GAD (GENERALIZED ANXIETY DISORDER): ICD-10-CM

## 2021-06-05 RX ORDER — HYDROXYZINE 50 MG/1
TABLET, FILM COATED ORAL
Qty: 90 TABLET | Refills: 0 | Status: SHIPPED | OUTPATIENT
Start: 2021-06-05 | End: 2021-07-07 | Stop reason: SDUPTHER

## 2021-07-07 DIAGNOSIS — F41.1 GAD (GENERALIZED ANXIETY DISORDER): ICD-10-CM

## 2021-07-07 RX ORDER — HYDROXYZINE 50 MG/1
100 TABLET, FILM COATED ORAL
Qty: 90 TABLET | Refills: 2 | Status: SHIPPED | OUTPATIENT
Start: 2021-07-07 | End: 2021-09-23

## 2021-07-12 DIAGNOSIS — E78.5 HYPERLIPIDEMIA, UNSPECIFIED HYPERLIPIDEMIA TYPE: ICD-10-CM

## 2021-07-14 RX ORDER — ATORVASTATIN CALCIUM 80 MG/1
TABLET, FILM COATED ORAL
Qty: 90 TABLET | Refills: 0 | Status: SHIPPED | OUTPATIENT
Start: 2021-07-14 | End: 2021-10-13

## 2021-07-17 DIAGNOSIS — K21.9 GASTROESOPHAGEAL REFLUX DISEASE WITHOUT ESOPHAGITIS: ICD-10-CM

## 2021-07-17 DIAGNOSIS — E11.9 TYPE 2 DIABETES MELLITUS WITHOUT COMPLICATION, WITHOUT LONG-TERM CURRENT USE OF INSULIN (HCC): ICD-10-CM

## 2021-07-17 RX ORDER — OMEPRAZOLE 40 MG/1
CAPSULE, DELAYED RELEASE ORAL
Qty: 90 CAPSULE | Refills: 0 | Status: SHIPPED | OUTPATIENT
Start: 2021-07-17 | End: 2022-01-07

## 2021-07-17 RX ORDER — GLIPIZIDE 10 MG/1
TABLET, FILM COATED, EXTENDED RELEASE ORAL
Qty: 90 TABLET | Refills: 0 | Status: SHIPPED | OUTPATIENT
Start: 2021-07-17 | End: 2021-10-12

## 2021-08-11 DIAGNOSIS — E78.5 HYPERLIPIDEMIA, UNSPECIFIED HYPERLIPIDEMIA TYPE: ICD-10-CM

## 2021-08-11 DIAGNOSIS — E11.9 TYPE 2 DIABETES MELLITUS WITHOUT COMPLICATION, WITHOUT LONG-TERM CURRENT USE OF INSULIN (HCC): ICD-10-CM

## 2021-08-11 DIAGNOSIS — E03.9 ACQUIRED HYPOTHYROIDISM: Primary | ICD-10-CM

## 2021-08-26 ENCOUNTER — TELEPHONE (OUTPATIENT)
Dept: FAMILY MEDICINE CLINIC | Age: 51
End: 2021-08-26

## 2021-08-26 NOTE — TELEPHONE ENCOUNTER
8/26/21 2:26 PM  Note     Spoke w/pt.   appt sched wi1st avail provider as pt wanted to be seen asap  ----- Message from Haven Duarte January sent at 8/26/2021  1:43 PM EDT -----  Regarding: Dr. Nikhil Rivera, telephone  Appointment not available     Caller's first and last name and relationship to patient (if not the patient):        Best contact number: [phone]        Preferred date and time: ASAP        Scheduled appointment date and time: N/A        Reason for appointment: Back pain         Details to clarify the request: Patient would like to like to schedule an appointment for back pain and there are no available appointments to schedule for.         Haven Farrell

## 2021-08-27 ENCOUNTER — OFFICE VISIT (OUTPATIENT)
Dept: FAMILY MEDICINE CLINIC | Age: 51
End: 2021-08-27

## 2021-08-27 VITALS
SYSTOLIC BLOOD PRESSURE: 122 MMHG | HEART RATE: 60 BPM | OXYGEN SATURATION: 98 % | BODY MASS INDEX: 28 KG/M2 | DIASTOLIC BLOOD PRESSURE: 79 MMHG | WEIGHT: 200 LBS | RESPIRATION RATE: 16 BRPM | TEMPERATURE: 97.3 F | HEIGHT: 71 IN

## 2021-08-27 DIAGNOSIS — E03.9 ACQUIRED HYPOTHYROIDISM: ICD-10-CM

## 2021-08-27 DIAGNOSIS — E11.9 TYPE 2 DIABETES MELLITUS WITHOUT COMPLICATION, WITHOUT LONG-TERM CURRENT USE OF INSULIN (HCC): ICD-10-CM

## 2021-08-27 DIAGNOSIS — M54.31 SCIATICA OF RIGHT SIDE: Primary | ICD-10-CM

## 2021-08-27 DIAGNOSIS — E78.5 HYPERLIPIDEMIA, UNSPECIFIED HYPERLIPIDEMIA TYPE: ICD-10-CM

## 2021-08-27 LAB
ALBUMIN SERPL-MCNC: 4.5 G/DL (ref 3.5–5)
ALBUMIN/GLOB SERPL: 1.3 {RATIO} (ref 1.1–2.2)
ALP SERPL-CCNC: 76 U/L (ref 45–117)
ALT SERPL-CCNC: 56 U/L (ref 12–78)
ANION GAP SERPL CALC-SCNC: 8 MMOL/L (ref 5–15)
AST SERPL-CCNC: 27 U/L (ref 15–37)
BILIRUB SERPL-MCNC: 1.4 MG/DL (ref 0.2–1)
BUN SERPL-MCNC: 13 MG/DL (ref 6–20)
BUN/CREAT SERPL: 14 (ref 12–20)
CALCIUM SERPL-MCNC: 9.4 MG/DL (ref 8.5–10.1)
CHLORIDE SERPL-SCNC: 105 MMOL/L (ref 97–108)
CHOLEST SERPL-MCNC: 116 MG/DL
CO2 SERPL-SCNC: 27 MMOL/L (ref 21–32)
CREAT SERPL-MCNC: 0.93 MG/DL (ref 0.7–1.3)
EST. AVERAGE GLUCOSE BLD GHB EST-MCNC: 192 MG/DL
GLOBULIN SER CALC-MCNC: 3.4 G/DL (ref 2–4)
GLUCOSE SERPL-MCNC: 154 MG/DL (ref 65–100)
HBA1C MFR BLD: 8.3 % (ref 4–5.6)
HDLC SERPL-MCNC: 37 MG/DL
HDLC SERPL: 3.1 {RATIO} (ref 0–5)
LDLC SERPL CALC-MCNC: 48.4 MG/DL (ref 0–100)
POTASSIUM SERPL-SCNC: 4.4 MMOL/L (ref 3.5–5.1)
PROT SERPL-MCNC: 7.9 G/DL (ref 6.4–8.2)
SODIUM SERPL-SCNC: 140 MMOL/L (ref 136–145)
TRIGL SERPL-MCNC: 153 MG/DL (ref ?–150)
TSH SERPL DL<=0.05 MIU/L-ACNC: 1.16 UIU/ML (ref 0.36–3.74)
VLDLC SERPL CALC-MCNC: 30.6 MG/DL

## 2021-08-27 PROCEDURE — 99214 OFFICE O/P EST MOD 30 MIN: CPT

## 2021-08-27 PROCEDURE — 3052F HG A1C>EQUAL 8.0%<EQUAL 9.0%: CPT

## 2021-08-27 RX ORDER — PREDNISONE 10 MG/1
TABLET ORAL
Qty: 24 TABLET | Refills: 0 | Status: SHIPPED | OUTPATIENT
Start: 2021-08-27 | End: 2021-08-27 | Stop reason: ALTCHOICE

## 2021-08-27 RX ORDER — LEVOTHYROXINE SODIUM 150 UG/1
TABLET ORAL
Qty: 60 TABLET | Refills: 2 | Status: SHIPPED | OUTPATIENT
Start: 2021-08-27 | End: 2021-12-23

## 2021-08-27 RX ORDER — PREDNISONE 20 MG/1
TABLET ORAL
Qty: 12 TABLET | Refills: 0 | Status: SHIPPED | OUTPATIENT
Start: 2021-08-27 | End: 2021-09-02

## 2021-08-27 NOTE — PROGRESS NOTES
CC: back pain  Subjective   Skyler Ardon is an 44 y.o. male who presents for back pain. He reports the pain started almost 3 months ago when he picked up a 5 gallon bucket of dirt in his garden. He said he felt sharp pain in that moment and the pain has now traveled into his right buttock and down his mid thigh. He describes the pain as 10/10 sharp pain that causes occasional numbness in his R leg. He reports it is painful to stand and that when he walks he often has to stop to sit down due to the pain. He has tried Advil, Tylenol, Aleve, and Excedrin with no relief. He says that laying down provides some relief. He used to work at [employer] and says he can no longer work d/t the pain and his ADLs/IADLs are also affected (toileting, transferring, cooking). Review of Systems   Review of Systems : occasional numbness in R leg    Allergies   No Known Allergies    Medications  Current Outpatient Medications   Medication Sig    predniSONE (DELTASONE) 20 mg tablet Take 60 mg by mouth daily (with breakfast) for 2 days, THEN 40 mg daily (with breakfast) for 2 days, THEN 20 mg daily (with breakfast) for 2 days.  Euthyrox 150 mcg tablet TAKE 2 TABLETS BY MOUTH ONCE DAILY BEFORE  BREAKFAST  (NEED  LABS  PRIOR  TO  NEXT  REFILL)    glipiZIDE SR (GLUCOTROL XL) 10 mg CR tablet Take 1 tablet by mouth once daily    omeprazole (PRILOSEC) 40 mg capsule Take 1 capsule by mouth once daily    atorvastatin (LIPITOR) 80 mg tablet Take 1 tablet by mouth nightly    hydrOXYzine HCL (ATARAX) 50 mg tablet Take 2 Tablets by mouth every six (6) hours as needed for Anxiety.  busPIRone (BUSPAR) 15 mg tablet Take 1 tablet by mouth twice daily    bisoprolol-hydroCHLOROthiazide (ZIAC) 5-6.25 mg per tablet Take 1 tablet by mouth once daily    buPROPion SR (WELLBUTRIN SR) 150 mg SR tablet Take 1 Tab by mouth daily.  metFORMIN (GLUCOPHAGE) 1,000 mg tablet Take 1 Tab by mouth two (2) times a day.     Blood-Glucose Meter monitoring kit Use once a day (Patient not taking: Reported on 8/27/2021)    Lancets misc Use once a day with glucometer (Patient not taking: Reported on 8/27/2021)    glucose blood VI test strips (BLOOD GLUCOSE TEST) strip Use once a day with glucometer (Patient not taking: Reported on 8/27/2021)     No current facility-administered medications for this visit. Medical History  Past Medical History:   Diagnosis Date    Anxiety     Chest pain     Diverticulosis     Hypothyroid 7/20/2010    Kidney stone 7/20/2010       Immunizations   Immunization History   Administered Date(s) Administered    Tdap 05/14/2014       Objective   Vital Signs  Visit Vitals  /79   Pulse 60   Temp 97.3 °F (36.3 °C) (Temporal)   Resp 16   Ht 5' 11\" (1.803 m)   Wt 200 lb (90.7 kg)   SpO2 98%   BMI 27.89 kg/m²       Physical Examination  Physical Exam   General: appears agitated   Heart: +s1/s2, RRR  Lungs: CTA b/l, no w/r/r  Abdomen: soft, nontender to palpation  MSK: +Straight leg raise on R, midline lumbar muscle hypertonicity,   Neuro: Muscle strength 5/5 b/l extremities, sensation intact b/l       Assessment and Plan   Lewellen Click. is a 44 y.o. who presents for R sided low back pain, likely sciatic pain. 1. Sciatica of right side  - Patient has tried a variety of NSAIDs without relief. Will now proceed to trying trial of Prednisone with PT.   - Xray Lumbar Spine shows L5-S1 facet arthropathy   - predniSONE (DELTASONE) 20 mg tablet; Take 60 mg by mouth daily (with breakfast) for 2 days, THEN 40 mg daily (with breakfast) for 2 days, THEN 20 mg daily (with breakfast) for 2 days. Dispense: 12 Tablet; Refill: 0  - Referred to PT    2. Acquired hypothyroidism  - Labs checked today, TSH:   - Euthyrox refilled           I have discussed the aforementioned diagnoses and plan with the patient in detail. I have provided information in person and/or in AVS. All questions answered prior to discharge.       I discussed this patient with Dr. Aby Perez (Attending Physician)   Signed By:  Penny Tirado DO    Family Medicine Resident

## 2021-08-27 NOTE — PATIENT INSTRUCTIONS
Sciatica: Care Instructions  Your Care Instructions     Sciatica (say \"fyp-BB-tb-kuh\") is an irritation of one of the sciatic nerves, which come from the spinal cord in the lower back. The sciatic nerves and their branches extend down through the buttock to the foot. Sciatica can develop when an injured disc in the back irritates or presses against a spinal nerve root. Its main symptom is pain, numbness, or weakness that is often worse in the leg or foot than in the back. Sciatica often will improve and go away with time. Early treatment usually includes medicines and exercises to relieve pain. Follow-up care is a key part of your treatment and safety. Be sure to make and go to all appointments, and call your doctor if you are having problems. It's also a good idea to know your test results and keep a list of the medicines you take. How can you care for yourself at home? · Take pain medicines exactly as directed. ? If the doctor gave you a prescription medicine for pain, take it as prescribed. ? If you are not taking a prescription pain medicine, ask your doctor if you can take an over-the-counter medicine. · Use heat or ice to relieve pain. ? To apply heat, put a warm water bottle, heating pad set on low, or warm cloth on your back. Do not go to sleep with a heating pad on your skin. ? To use ice, put ice or a cold pack on the area for 10 to 20 minutes at a time. Put a thin cloth between the ice and your skin. · Avoid sitting if possible, unless it feels better than standing. · Alternate lying down with short walks. Increase your walking distance as you are able to without making your symptoms worse. · Do not do anything that makes your symptoms worse. When should you call for help? Call 911 anytime you think you may need emergency care. For example, call if:    · You are unable to move a leg at all.    Call your doctor now or seek immediate medical care if:    · You have new or worse symptoms in your legs or buttocks. Symptoms may include:  ? Numbness or tingling. ? Weakness. ? Pain.     · You lose bladder or bowel control. Watch closely for changes in your health, and be sure to contact your doctor if:    · You are not getting better as expected. Where can you learn more? Go to http://www.gray.com/  Enter Z239 in the search box to learn more about \"Sciatica: Care Instructions. \"  Current as of: November 16, 2020               Content Version: 12.8  © 2006-2021 [employer]. Care instructions adapted under license by [employer] (which disclaims liability or warranty for this information). If you have questions about a medical condition or this instruction, always ask your healthcare professional. [address] any warranty or liability for your use of this information.

## 2021-08-27 NOTE — PROGRESS NOTES
Chief Complaint   Patient presents with    Back Pain     times 2 months     1. Have you been to the ER, urgent care clinic since your last visit? Hospitalized since your last visit? No    2. Have you seen or consulted any other health care providers outside of the [address] [place] [address] since your last visit? Include any pap smears or colon screening.  No

## 2021-08-28 ENCOUNTER — TELEPHONE (OUTPATIENT)
Dept: FAMILY MEDICINE CLINIC | Age: 51
End: 2021-08-28

## 2021-08-30 ENCOUNTER — TELEPHONE (OUTPATIENT)
Dept: FAMILY MEDICINE CLINIC | Age: 51
End: 2021-08-30

## 2021-08-30 NOTE — TELEPHONE ENCOUNTER
Called, pt unable to talk at call. His father states he will have pt call us back. Ilene Clemens, DO  [address]     Can we schedule an appt with this patient to discuss his lab work?  It can be virtual. Thanks

## 2021-08-31 ENCOUNTER — TELEPHONE (OUTPATIENT)
Dept: FAMILY MEDICINE CLINIC | Age: 51
End: 2021-08-31

## 2021-08-31 NOTE — TELEPHONE ENCOUNTER
--[phone]    Patient said he was put on prednisone and it caused his blood pressure to go ruben high Saturday night. Said his face got all red and he called 911 who advised ER but said he refused in which he just toughed it out. Asked him what the blood pressure reading was and said he DOES NOT have a blood pressure machine at home so he did not know what it was. Gayathri Palomo he wants to speak with the doctor about this and also he is still having back pain.

## 2021-09-01 NOTE — TELEPHONE ENCOUNTER
Called and left message for a call back.  Would advise patient to go to ER if having any difficulty breathing, chest pains etc.

## 2021-09-02 ENCOUNTER — TELEPHONE (OUTPATIENT)
Dept: FAMILY MEDICINE CLINIC | Age: 51
End: 2021-09-02

## 2021-09-02 RX ORDER — CYCLOBENZAPRINE HCL 5 MG
5 TABLET ORAL
Qty: 7 TABLET | Refills: 0 | Status: SHIPPED | OUTPATIENT
Start: 2021-09-02 | End: 2021-09-14 | Stop reason: SDUPTHER

## 2021-09-02 NOTE — TELEPHONE ENCOUNTER
Pt reports on 2nd day of taking prednisone for back pain his face turned red and his BP shot up. Did not take actual BP measurement at the time and did not go to ER. Reports he just went to sleep and when he woke up, the side effects had resolved. He reports his back pain persists with no improvement and reports some muscle spasms in his low back. Denies worsening of pain and loss of bowel/bladder function. No saddle anesthesia. He also says he cannot afford to attend physical therapy. Will prescribe trial of Flexeril with NSAID/or tylenol for 1 week.

## 2021-09-09 ENCOUNTER — TELEPHONE (OUTPATIENT)
Dept: FAMILY MEDICINE CLINIC | Age: 51
End: 2021-09-09

## 2021-09-09 NOTE — TELEPHONE ENCOUNTER
----- Message from Sanya Mack sent at 9/9/2021  1:19 PM EDT -----  Regarding: Ayoob/telephone  Contact: [phone]  General Message/Vendor Calls    Caller's first and last name: n/a      Reason for call: Back still bothering him, wants somebody to call him      Callback required yes/no and why: Yes to talk to him      Best contact number(s): [phone]      Details to clarify the request: n/a      Mikaelas Frederick

## 2021-09-13 NOTE — TELEPHONE ENCOUNTER
Attempted to call patient to discuss back pain, was prompted to enter access code and then the line hung up.

## 2021-09-14 RX ORDER — CYCLOBENZAPRINE HCL 5 MG
5 TABLET ORAL
Qty: 7 TABLET | Refills: 0 | Status: SHIPPED | OUTPATIENT
Start: 2021-09-14 | End: 2021-10-21

## 2021-09-23 DIAGNOSIS — F41.1 GAD (GENERALIZED ANXIETY DISORDER): ICD-10-CM

## 2021-09-23 RX ORDER — HYDROXYZINE 50 MG/1
TABLET, FILM COATED ORAL
Qty: 90 TABLET | Refills: 0 | Status: SHIPPED | OUTPATIENT
Start: 2021-09-23 | End: 2021-10-18 | Stop reason: SDUPTHER

## 2021-10-01 ENCOUNTER — TELEPHONE (OUTPATIENT)
Dept: FAMILY MEDICINE CLINIC | Age: 51
End: 2021-10-01

## 2021-10-01 NOTE — TELEPHONE ENCOUNTER
----- Message from Luis M Valdez sent at 9/30/2021  4:27 PM EDT -----  Regarding: /telephone  General Message/Vendor Calls    Caller's first and last name: N/a      Reason for call: Request for medication        Callback required yes/no and why: yes       Best contact number(s): [phone]      Details to clarify the request: Pt calling to request stranger medication for back. Pt stated current medication is not helping too well.       Luis M Valdez

## 2021-10-05 DIAGNOSIS — F41.1 GAD (GENERALIZED ANXIETY DISORDER): ICD-10-CM

## 2021-10-06 RX ORDER — BUSPIRONE HYDROCHLORIDE 15 MG/1
TABLET ORAL
Qty: 90 TABLET | Refills: 0 | Status: SHIPPED | OUTPATIENT
Start: 2021-10-06 | End: 2021-11-19

## 2021-10-11 DIAGNOSIS — E11.9 TYPE 2 DIABETES MELLITUS WITHOUT COMPLICATION, WITHOUT LONG-TERM CURRENT USE OF INSULIN (HCC): ICD-10-CM

## 2021-10-12 RX ORDER — GLIPIZIDE 10 MG/1
TABLET, FILM COATED, EXTENDED RELEASE ORAL
Qty: 90 TABLET | Refills: 0 | Status: SHIPPED | OUTPATIENT
Start: 2021-10-12 | End: 2021-10-13

## 2021-10-17 DIAGNOSIS — F41.1 GAD (GENERALIZED ANXIETY DISORDER): ICD-10-CM

## 2021-10-18 RX ORDER — HYDROXYZINE 50 MG/1
TABLET, FILM COATED ORAL
Qty: 90 TABLET | Refills: 0 | Status: SHIPPED | OUTPATIENT
Start: 2021-10-18 | End: 2021-11-19

## 2021-10-21 RX ORDER — CYCLOBENZAPRINE HCL 5 MG
5 TABLET ORAL
Qty: 7 TABLET | Refills: 0 | Status: SHIPPED | OUTPATIENT
Start: 2021-10-21

## 2021-11-14 DIAGNOSIS — E11.9 TYPE 2 DIABETES MELLITUS WITHOUT COMPLICATION, WITHOUT LONG-TERM CURRENT USE OF INSULIN (HCC): ICD-10-CM

## 2021-11-14 DIAGNOSIS — F41.1 GAD (GENERALIZED ANXIETY DISORDER): ICD-10-CM

## 2021-11-19 RX ORDER — METFORMIN HYDROCHLORIDE 1000 MG/1
TABLET ORAL
Qty: 180 TABLET | Refills: 0 | Status: SHIPPED | OUTPATIENT
Start: 2021-11-19 | End: 2022-02-21

## 2021-11-19 RX ORDER — BUSPIRONE HYDROCHLORIDE 15 MG/1
TABLET ORAL
Qty: 90 TABLET | Refills: 0 | Status: SHIPPED | OUTPATIENT
Start: 2021-11-19 | End: 2022-01-07

## 2021-12-20 DIAGNOSIS — E03.9 ACQUIRED HYPOTHYROIDISM: ICD-10-CM

## 2021-12-23 RX ORDER — LEVOTHYROXINE SODIUM 150 UG/1
TABLET ORAL
Qty: 60 TABLET | Refills: 0 | Status: SHIPPED | OUTPATIENT
Start: 2021-12-23 | End: 2022-01-14

## 2022-01-05 DIAGNOSIS — F41.1 GAD (GENERALIZED ANXIETY DISORDER): ICD-10-CM

## 2022-01-05 DIAGNOSIS — K21.9 GASTROESOPHAGEAL REFLUX DISEASE WITHOUT ESOPHAGITIS: ICD-10-CM

## 2022-01-07 RX ORDER — BUSPIRONE HYDROCHLORIDE 15 MG/1
TABLET ORAL
Qty: 90 TABLET | Refills: 0 | Status: SHIPPED | OUTPATIENT
Start: 2022-01-07 | End: 2022-02-28 | Stop reason: SDUPTHER

## 2022-01-07 RX ORDER — OMEPRAZOLE 40 MG/1
CAPSULE, DELAYED RELEASE ORAL
Qty: 90 CAPSULE | Refills: 0 | Status: SHIPPED | OUTPATIENT
Start: 2022-01-07

## 2022-01-14 DIAGNOSIS — E03.9 ACQUIRED HYPOTHYROIDISM: ICD-10-CM

## 2022-01-14 RX ORDER — LEVOTHYROXINE SODIUM 150 UG/1
TABLET ORAL
Qty: 60 TABLET | Refills: 0 | Status: SHIPPED | OUTPATIENT
Start: 2022-01-14 | End: 2022-03-18

## 2022-01-20 DIAGNOSIS — F41.1 GAD (GENERALIZED ANXIETY DISORDER): ICD-10-CM

## 2022-01-21 RX ORDER — HYDROXYZINE 50 MG/1
TABLET, FILM COATED ORAL
Qty: 120 TABLET | Refills: 0 | Status: SHIPPED | OUTPATIENT
Start: 2022-01-21 | End: 2022-02-28 | Stop reason: SDUPTHER

## 2022-02-02 ENCOUNTER — TELEPHONE (OUTPATIENT)
Dept: FAMILY MEDICINE CLINIC | Age: 52
End: 2022-02-02

## 2022-02-02 NOTE — TELEPHONE ENCOUNTER
Called patient to set up an appointment per [facility] for further refills. Left vm for patient to give us a call back.

## 2022-02-18 DIAGNOSIS — E11.9 TYPE 2 DIABETES MELLITUS WITHOUT COMPLICATION, WITHOUT LONG-TERM CURRENT USE OF INSULIN (HCC): ICD-10-CM

## 2022-02-21 RX ORDER — METFORMIN HYDROCHLORIDE 1000 MG/1
TABLET ORAL
Qty: 180 TABLET | Refills: 0 | Status: SHIPPED | OUTPATIENT
Start: 2022-02-21 | End: 2022-04-22 | Stop reason: SDUPTHER

## 2022-03-03 DIAGNOSIS — F41.1 GAD (GENERALIZED ANXIETY DISORDER): ICD-10-CM

## 2022-03-03 RX ORDER — BUPROPION HYDROCHLORIDE 150 MG/1
TABLET, EXTENDED RELEASE ORAL
Qty: 30 TABLET | Refills: 0 | OUTPATIENT
Start: 2022-03-03

## 2022-03-08 NOTE — TELEPHONE ENCOUNTER
Hello pt call to refill this medicine,the pt is out of medicine. pt will call back to schedule appt for next month. pt wants medicine before appt.     Please and thank you

## 2022-03-10 RX ORDER — BUPROPION HYDROCHLORIDE 150 MG/1
150 TABLET, EXTENDED RELEASE ORAL DAILY
Qty: 30 TABLET | Refills: 0 | Status: SHIPPED | OUTPATIENT
Start: 2022-03-10 | End: 2022-04-22 | Stop reason: SDUPTHER

## 2022-03-14 DIAGNOSIS — E03.9 ACQUIRED HYPOTHYROIDISM: ICD-10-CM

## 2022-03-18 RX ORDER — LEVOTHYROXINE SODIUM 150 UG/1
TABLET ORAL
Qty: 60 TABLET | Refills: 0 | Status: SHIPPED | OUTPATIENT
Start: 2022-03-18 | End: 2022-04-21

## 2022-04-07 DIAGNOSIS — F41.1 GAD (GENERALIZED ANXIETY DISORDER): ICD-10-CM

## 2022-04-08 ENCOUNTER — TELEPHONE (OUTPATIENT)
Dept: FAMILY MEDICINE CLINIC | Age: 52
End: 2022-04-08

## 2022-04-08 RX ORDER — HYDROXYZINE 50 MG/1
TABLET, FILM COATED ORAL
Qty: 60 TABLET | Refills: 0 | OUTPATIENT
Start: 2022-04-08

## 2022-04-08 RX ORDER — BUPROPION HYDROCHLORIDE 150 MG/1
150 TABLET, EXTENDED RELEASE ORAL DAILY
Qty: 30 TABLET | Refills: 0 | OUTPATIENT
Start: 2022-04-08

## 2022-04-08 RX ORDER — BUSPIRONE HYDROCHLORIDE 15 MG/1
15 TABLET ORAL 2 TIMES DAILY
Qty: 60 TABLET | Refills: 0 | OUTPATIENT
Start: 2022-04-08

## 2022-04-08 NOTE — TELEPHONE ENCOUNTER
Good Morning,  Please schedule this patient an in office appointment in order to get medications refilled. He needs to follow up since he has not been seen in a while.    Thank you

## 2022-04-08 NOTE — TELEPHONE ENCOUNTER
----- Message from Sathya Gonzalez sent at 4/7/2022  1:21 PM EDT -----  Subject: Refill Request    QUESTIONS  Name of Medication? busPIRone (BUSPAR) 15 mg tablet  Patient-reported dosage and instructions? 1 tablet 2x's daily  How many days do you have left? 0  Preferred Pharmacy? [facility]  Pharmacy phone number (if available)? [phone]  ---------------------------------------------------------------------------  --------------,  Name of Medication? hydrOXYzine HCL (ATARAX) 50 mg tablet  Patient-reported dosage and instructions? 1 tablet 2x's daily  How many days do you have left? 0  Preferred Pharmacy? [facility]  Pharmacy phone number (if available)? [phone]  ---------------------------------------------------------------------------  --------------,  Name of Medication? buPROPion SR (WELLBUTRIN SR) 150 mg SR tablet  Patient-reported dosage and instructions? 1 tablet daily  How many days do you have left? 0  Preferred Pharmacy? [facility]  Pharmacy phone number (if available)? [phone]  Additional Information for Provider? pt has called these in as he in the   last week and has not heard anything as to why they are not being   refilled, pt is currently out of all meds and needs refills Please call   with instructions if he needs to be seen in office or how to get RX's   refilled ASAP  ---------------------------------------------------------------------------  --------------  [address]  What is the best way for the office to contact you? OK to leave message on   voicemail  Preferred Call Back Phone Number? [phone]  ---------------------------------------------------------------------------  --------------  SCRIPT ANSWERS  Relationship to Patient?  Self

## 2022-04-08 NOTE — TELEPHONE ENCOUNTER
I called pt to schedule follow up appt for future  med refill.i left a voicemail for the pt to give us a call back for scheduling.

## 2022-04-20 DIAGNOSIS — E03.9 ACQUIRED HYPOTHYROIDISM: ICD-10-CM

## 2022-04-21 RX ORDER — LEVOTHYROXINE SODIUM 150 UG/1
TABLET ORAL
Qty: 60 TABLET | Refills: 0 | Status: SHIPPED | OUTPATIENT
Start: 2022-04-21 | End: 2022-05-31

## 2022-04-22 ENCOUNTER — OFFICE VISIT (OUTPATIENT)
Dept: FAMILY MEDICINE CLINIC | Age: 52
End: 2022-04-22

## 2022-04-22 VITALS
TEMPERATURE: 98 F | WEIGHT: 205 LBS | HEIGHT: 71 IN | DIASTOLIC BLOOD PRESSURE: 83 MMHG | BODY MASS INDEX: 28.7 KG/M2 | HEART RATE: 80 BPM | OXYGEN SATURATION: 98 % | SYSTOLIC BLOOD PRESSURE: 130 MMHG | RESPIRATION RATE: 18 BRPM

## 2022-04-22 DIAGNOSIS — E11.9 TYPE 2 DIABETES MELLITUS WITHOUT COMPLICATION, WITHOUT LONG-TERM CURRENT USE OF INSULIN (HCC): ICD-10-CM

## 2022-04-22 DIAGNOSIS — F41.1 GAD (GENERALIZED ANXIETY DISORDER): ICD-10-CM

## 2022-04-22 DIAGNOSIS — F41.9 ANXIETY AND DEPRESSION: Primary | ICD-10-CM

## 2022-04-22 DIAGNOSIS — F32.A ANXIETY AND DEPRESSION: Primary | ICD-10-CM

## 2022-04-22 DIAGNOSIS — E78.5 HYPERLIPIDEMIA, UNSPECIFIED HYPERLIPIDEMIA TYPE: ICD-10-CM

## 2022-04-22 PROCEDURE — 99214 OFFICE O/P EST MOD 30 MIN: CPT | Performed by: STUDENT IN AN ORGANIZED HEALTH CARE EDUCATION/TRAINING PROGRAM

## 2022-04-22 RX ORDER — METFORMIN HYDROCHLORIDE 1000 MG/1
1000 TABLET ORAL 2 TIMES DAILY
Qty: 180 TABLET | Refills: 3 | Status: SHIPPED | OUTPATIENT
Start: 2022-04-22 | End: 2022-08-30 | Stop reason: SDUPTHER

## 2022-04-22 RX ORDER — HYDROXYZINE 50 MG/1
TABLET, FILM COATED ORAL
Qty: 60 TABLET | Refills: 1 | Status: SHIPPED | OUTPATIENT
Start: 2022-04-22 | End: 2022-06-29

## 2022-04-22 RX ORDER — BUSPIRONE HYDROCHLORIDE 15 MG/1
15 TABLET ORAL 2 TIMES DAILY
Qty: 180 TABLET | Refills: 3 | Status: SHIPPED | OUTPATIENT
Start: 2022-04-22

## 2022-04-22 RX ORDER — ATORVASTATIN CALCIUM 80 MG/1
80 TABLET, FILM COATED ORAL
Qty: 90 TABLET | Refills: 3 | Status: SHIPPED | OUTPATIENT
Start: 2022-04-22

## 2022-04-22 RX ORDER — BUPROPION HYDROCHLORIDE 150 MG/1
150 TABLET, EXTENDED RELEASE ORAL DAILY
Qty: 90 TABLET | Refills: 3 | Status: SHIPPED | OUTPATIENT
Start: 2022-04-22

## 2022-04-22 RX ORDER — GLIPIZIDE 10 MG/1
10 TABLET, FILM COATED, EXTENDED RELEASE ORAL DAILY
Qty: 90 TABLET | Refills: 3 | Status: SHIPPED | OUTPATIENT
Start: 2022-04-22

## 2022-04-22 NOTE — PROGRESS NOTES
[facility] [address] [place] [address]   Office [phone], Fax [phone]      Chief Complaint:     Chief Complaint   Patient presents with    Follow Up Chronic Condition     Patient needs multiple medications refilled       Ajit Marcos is a 46 y.o. male that presents for: Anxiety and Depression      Assessment/Plan:   I personally reviewed the following Pertinent Labs/Studies:   - Encounter Notes from [id number], Labs from 2021. Diagnoses and all orders for this visit:    1. Anxiety and depression  Assessment & Plan:  Well controlled on current regimen  PHQ9 score: 9  GAD7 score: 10  Denies SI/HI    Orders:  -     buPROPion SR (WELLBUTRIN SR) 150 mg SR tablet; Take 1 Tablet by mouth daily. -     busPIRone (BUSPAR) 15 mg tablet; Take 1 Tablet by mouth two (2) times a day. 2. Hyperlipidemia, unspecified hyperlipidemia type  Assessment & Plan:  Refill given    Orders:  -     atorvastatin (LIPITOR) 80 mg tablet; Take 1 Tablet by mouth nightly. 3. Type 2 diabetes mellitus without complication, without long-term current use of insulin (HCC)  Assessment & Plan:  Pt has some financial restraints but is complaint with meds  Well repeat labs in 3 months at yearly visit  Declined labs today  Refills given    Orders:  -     glipiZIDE SR (GLUCOTROL XL) 10 mg CR tablet; Take 1 Tablet by mouth daily. -     metFORMIN (GLUCOPHAGE) 1,000 mg tablet; Take 1 Tablet by mouth two (2) times a day. 4. JULIANNA (generalized anxiety disorder)  -     hydrOXYzine HCL (ATARAX) 50 mg tablet; TAKE 2 TABLETS BY MOUTH EVERY 6 HOURS AS NEEDED FOR ANXIETY         Follow up: Follow-up and Dispositions    · Return in about 4 months (around 8/22/2022) for Diabetes and Hypothyroid.           Subjective:   HPI:  Ajit Marcos is a 46 y.o. male that presents for:    Anxiety and Depression:  - Has been out of his medications for about a week  - Tried multiple SSRI's however were not effective  - Otherwise is compliant with Wellbutrin 150 mg and Buspar 15 mg BID and Atarax PRN  - Denies SI/HI, elevated mood/irritability, euphoria, pressured speech, visual/auditory hallucinations, cold intolerance, dry skin, constipation, diarrhea, tachycardia, palpitations, and dysphagia/hoarseness    - Patient also needing refills on medications for diabetes and hyperlipidemia    ROS:   Review of Systems   Constitutional: Negative for chills, fever and malaise/fatigue. Respiratory: Negative for shortness of breath. Cardiovascular: Negative for chest pain, palpitations and leg swelling. Gastrointestinal: Negative for abdominal pain, constipation, diarrhea, nausea and vomiting. Neurological: Negative for dizziness and headaches. Psychiatric/Behavioral: Negative for depression, hallucinations, substance abuse and suicidal ideas. The patient is not nervous/anxious and does not have insomnia. Past medical history, social history, and medications personally reviewed. Past Medical History:   Diagnosis Date    Anxiety     Chest pain     Diverticulosis     Hypothyroid 7/20/2010    Kidney stone 7/20/2010        Allergies personally reviewed. No Known Allergies       Objective:   Vitals reviewed. Visit Vitals  /83 (BP 1 Location: Right arm, BP Patient Position: Sitting, BP Cuff Size: Adult)   Pulse 80   Temp 98 °F (36.7 °C) (Oral)   Resp 18   Ht 5' 11\" (1.803 m)   Wt 205 lb (93 kg)   SpO2 98%   BMI 28.59 kg/m²        Physical Exam  Physical Exam  Constitutional:       General: He is not in acute distress. Appearance: Normal appearance. He is not ill-appearing, toxic-appearing or diaphoretic. Cardiovascular:      Rate and Rhythm: Normal rate and regular rhythm. Heart sounds: No murmur heard. No friction rub. No gallop. Pulmonary:      Effort: Pulmonary effort is normal.      Breath sounds: Normal breath sounds. Skin:     General: Skin is warm and dry.    Neurological:      General: No focal deficit present. Mental Status: He is alert and oriented to person, place, and time. Psychiatric:         Mood and Affect: Mood normal.         Behavior: Behavior normal.         Thought Content: Thought content normal.         Judgment: Judgment normal.          Pt was discussed with Dr Yi Randolph (attending physician). I have reviewed pertinent labs results and other data. I have discussed the diagnosis with the patient and the intended plan as seen in the above orders. The patient has received an after-visit summary and questions were answered concerning future plans. I have discussed medication side effects and warnings with the patient as well.     Nury Viramontes MD  Resident [facility] 110  04/22/22

## 2022-04-22 NOTE — PROGRESS NOTES
Identified Patient with two Patient identifiers (Name and ). Two Patient Identifiers confirmed. Reviewed record in preparation for visit and have obtained necessary documentation. Chief Complaint   Patient presents with    Follow Up Chronic Condition     Patient needs multiple medications refilled       Visit Vitals  /83 (BP 1 Location: Right arm, BP Patient Position: Sitting, BP Cuff Size: Adult)   Pulse 80   Temp 98 °F (36.7 °C) (Oral)   Resp 18   Ht 5' 11\" (1.803 m)   Wt 205 lb (93 kg)   SpO2 98%   BMI 28.59 kg/m²       1. Have you been to the ER, urgent care clinic since your last visit? Hospitalized since your last visit? No    2. Have you seen or consulted any other health care providers outside of the [address] [place] [address] since your last visit? Include any pap smears or colon screening.  No

## 2022-04-22 NOTE — ASSESSMENT & PLAN NOTE
Pt has some financial restraints but is complaint with meds  Well repeat labs in 3 months at yearly visit  Declined labs today  Refills given

## 2022-05-17 DIAGNOSIS — I10 ESSENTIAL HYPERTENSION: ICD-10-CM

## 2022-05-17 RX ORDER — BISOPROLOL FUMARATE AND HYDROCHLOROTHIAZIDE 5; 6.25 MG/1; MG/1
1 TABLET ORAL DAILY
Qty: 90 TABLET | Refills: 1 | Status: SHIPPED | OUTPATIENT
Start: 2022-05-17 | End: 2022-05-20 | Stop reason: SDUPTHER

## 2022-05-20 DIAGNOSIS — I10 ESSENTIAL HYPERTENSION: ICD-10-CM

## 2022-05-20 RX ORDER — BISOPROLOL FUMARATE AND HYDROCHLOROTHIAZIDE 5; 6.25 MG/1; MG/1
1 TABLET ORAL DAILY
Qty: 90 TABLET | Refills: 1 | Status: SHIPPED | OUTPATIENT
Start: 2022-05-20

## 2022-05-31 DIAGNOSIS — E03.9 ACQUIRED HYPOTHYROIDISM: ICD-10-CM

## 2022-05-31 RX ORDER — LEVOTHYROXINE SODIUM 150 UG/1
TABLET ORAL
Qty: 60 TABLET | Refills: 0 | Status: SHIPPED | OUTPATIENT
Start: 2022-05-31 | End: 2022-06-27

## 2022-06-27 DIAGNOSIS — F41.1 GAD (GENERALIZED ANXIETY DISORDER): ICD-10-CM

## 2022-06-27 DIAGNOSIS — E03.9 ACQUIRED HYPOTHYROIDISM: ICD-10-CM

## 2022-06-27 RX ORDER — LEVOTHYROXINE SODIUM 150 UG/1
TABLET ORAL
Qty: 60 TABLET | Refills: 0 | Status: SHIPPED | OUTPATIENT
Start: 2022-06-27 | End: 2022-08-16

## 2022-06-29 RX ORDER — HYDROXYZINE 50 MG/1
TABLET, FILM COATED ORAL
Qty: 60 TABLET | Refills: 0 | Status: SHIPPED | OUTPATIENT
Start: 2022-06-29 | End: 2022-08-11

## 2022-08-07 DIAGNOSIS — F41.1 GAD (GENERALIZED ANXIETY DISORDER): ICD-10-CM

## 2022-08-11 RX ORDER — HYDROXYZINE 50 MG/1
TABLET, FILM COATED ORAL
Qty: 60 TABLET | Refills: 0 | Status: SHIPPED | OUTPATIENT
Start: 2022-08-11 | End: 2022-09-16 | Stop reason: SDUPTHER

## 2022-08-16 DIAGNOSIS — E03.9 ACQUIRED HYPOTHYROIDISM: ICD-10-CM

## 2022-08-16 RX ORDER — LEVOTHYROXINE SODIUM 150 UG/1
TABLET ORAL
Qty: 60 TABLET | Refills: 0 | Status: SHIPPED | OUTPATIENT
Start: 2022-08-16 | End: 2022-10-06

## 2022-08-30 DIAGNOSIS — E11.9 TYPE 2 DIABETES MELLITUS WITHOUT COMPLICATION, WITHOUT LONG-TERM CURRENT USE OF INSULIN (HCC): ICD-10-CM

## 2022-08-30 RX ORDER — METFORMIN HYDROCHLORIDE 1000 MG/1
1000 TABLET ORAL 2 TIMES DAILY
Qty: 180 TABLET | Refills: 3 | Status: SHIPPED | OUTPATIENT
Start: 2022-08-30

## 2022-08-31 ENCOUNTER — TELEPHONE (OUTPATIENT)
Dept: FAMILY MEDICINE CLINIC | Age: 52
End: 2022-08-31

## 2022-08-31 NOTE — TELEPHONE ENCOUNTER
----- Message from Fady Funk sent at 8/31/2022  8:24 AM EDT -----  Regarding: diabetes follow up  Starr Boone!  When you get a chance, could you get this pt set up for a diabetes follow up with dr Fiona Huang please

## 2022-08-31 NOTE — TELEPHONE ENCOUNTER
Called patient to get him scheduled for a follow up appointment for diabetes. Patient said that he will call us back when he's almost out of medication to schedule appointment.

## 2022-09-16 DIAGNOSIS — F41.1 GAD (GENERALIZED ANXIETY DISORDER): ICD-10-CM

## 2022-09-16 NOTE — TELEPHONE ENCOUNTER
Good Afternoon ,   I received a message stating that the patient is requesting a refill for:  hydrOXYzine HCL (ATARAX) 50 mg tablet  To be refilled and sent to the pharmacy. This is one of 's patients. Can you help me with this please?   Thank you

## 2022-09-18 RX ORDER — HYDROXYZINE 50 MG/1
TABLET, FILM COATED ORAL
Qty: 60 TABLET | Refills: 0 | Status: SHIPPED | OUTPATIENT
Start: 2022-09-18 | End: 2022-10-27 | Stop reason: SDUPTHER

## 2022-09-22 ENCOUNTER — TELEPHONE (OUTPATIENT)
Dept: FAMILY MEDICINE CLINIC | Age: 52
End: 2022-09-22

## 2022-10-05 DIAGNOSIS — E03.9 ACQUIRED HYPOTHYROIDISM: ICD-10-CM

## 2022-10-06 RX ORDER — LEVOTHYROXINE SODIUM 150 UG/1
TABLET ORAL
Qty: 60 TABLET | Refills: 0 | Status: SHIPPED | OUTPATIENT
Start: 2022-10-06

## 2022-11-01 ENCOUNTER — TELEPHONE (OUTPATIENT)
Dept: FAMILY MEDICINE CLINIC | Age: 52
End: 2022-11-01

## 2022-11-01 NOTE — TELEPHONE ENCOUNTER
Called patient to schedule a lab appointment per Dr. Dara Dalal. There was no answer but I did leave a vm with a call back number.

## 2022-12-15 ENCOUNTER — VIRTUAL VISIT (OUTPATIENT)
Dept: FAMILY MEDICINE CLINIC | Age: 52
End: 2022-12-15

## 2022-12-15 DIAGNOSIS — I10 PRIMARY HYPERTENSION: ICD-10-CM

## 2022-12-15 DIAGNOSIS — E78.5 HYPERLIPIDEMIA, UNSPECIFIED HYPERLIPIDEMIA TYPE: ICD-10-CM

## 2022-12-15 DIAGNOSIS — R80.9 TYPE 2 DIABETES MELLITUS WITH MICROALBUMINURIA, WITHOUT LONG-TERM CURRENT USE OF INSULIN (HCC): ICD-10-CM

## 2022-12-15 DIAGNOSIS — E11.29 TYPE 2 DIABETES MELLITUS WITH MICROALBUMINURIA, WITHOUT LONG-TERM CURRENT USE OF INSULIN (HCC): ICD-10-CM

## 2022-12-15 DIAGNOSIS — E03.9 ACQUIRED HYPOTHYROIDISM: Primary | ICD-10-CM

## 2022-12-15 PROCEDURE — 99213 OFFICE O/P EST LOW 20 MIN: CPT

## 2022-12-15 RX ORDER — LEVOTHYROXINE SODIUM 150 UG/1
TABLET ORAL
Qty: 30 TABLET | Refills: 0 | Status: SHIPPED | OUTPATIENT
Start: 2022-12-15

## 2022-12-15 NOTE — PROGRESS NOTES
Pat Pal.  45 y.o. male  1970  [facility] [id number]  [id number]   [address]:    Telemedicine Progress Note  Trinidad Began, DO       Encounter Date and Time: December 15, 2022 at 2:03 PM    Consent:  He and/or the health care decision maker is aware that that he may receive a bill for this telephone service, depending on his insurance coverage, and has provided verbal consent to proceed: Yes    Chief Complaint   Patient presents with    Medication Refill     History of Present Illness   Pat Villa is a 45 y.o. male was evaluated by synchronous (real-time) audio-video technology from home, through the telephone. Patient reports doing well. No complaints today. Has been out of synthroid for the past week. Has not had any adverse effects. Has been on the same dose for years. Requesting refills. States he will come in for blood work. Review of Systems   Review of Systems   Constitutional:  Negative for chills, fever, malaise/fatigue and weight loss. HENT:  Negative for congestion. Respiratory:  Negative for cough and shortness of breath. Cardiovascular:  Negative for chest pain. Gastrointestinal:  Negative for abdominal pain. Genitourinary:  Negative for dysuria. Neurological:  Negative for dizziness, weakness and headaches. Psychiatric/Behavioral:  Negative for depression. The patient is not nervous/anxious. Vitals/Objective:       Due to this being a TeleHealth evaluation, many elements of the physical examination are unable to be assessed. Assessment and Plan:   Time-based coding, delete if not needed: I spent at least 10 minutes with this established patient, and >50% of the time was spent counseling and/or coordinating care regarding hypothyroidism, medical management. 1. Acquired hypothyroidism  - TSH 3RD GENERATION;  Future  - Will also order additional routine labs as patient has not had any labs done this year. - levothyroxine (SYNTHROID) 150 mcg tablet; TAKE 2 TABLETS BY MOUTH ONCE DAILY BEFORE BREAKFAST  Dispense: 30 Tablet; Refill: 0      Time spent in direct conversation with the patient to include medical condition(s) discussed, assessment and treatment plan:       We discussed the expected course, resolution and complications of the diagnosis(es) in detail. Medication risks, benefits, costs, interactions, and alternatives were discussed as indicated. I advised him to contact the office if his condition worsens, changes or fails to improve as anticipated. He expressed understanding with the diagnosis(es) and plan. Patient understands that this encounter was a temporary measure, and the importance of further follow up and examination was emphasized. Patient verbalized understanding. Patient informed to follow up: Follow-up and Dispositions    Return in about 1 week (around 12/22/2022) for lab appt for TSH. Electronically Signed: Lorrie Washington DO    Providers location when delivering service (clinic, hospital, home): home      Pursuant to the emergency declaration under the [facility], 1135 waiver authority and the Coronavirus Preparedness and Response Supplemental Appropriations Act, this Virtual  Visit was conducted, with patient's consent, to reduce the patient's risk of exposure to COVID-19 and provide continuity of care for an established patient. Services were provided through a video synchronous discussion virtually to substitute for in-person clinic visit. History   Patients past medical, surgical and family histories were reviewed and updated.       Past Medical History:   Diagnosis Date    Anxiety     Chest pain     Diverticulosis     Hypothyroid 7/20/2010    Kidney stone 7/20/2010     Past Surgical History:   Procedure Laterality Date    HX HEENT  1982    jaw surgery     Family History   Problem Relation Age of Onset    Hypertension Mother     Heart Disease Mother         valve placement     Hypertension Father     Asthma Father      Social History     Socioeconomic History    Marital status: SINGLE     Spouse name: Not on file    Number of children: Not on file    Years of education: Not on file    Highest education level: Not on file   Occupational History    Not on file   Tobacco Use    Smoking status: Every Day     Packs/day: 0.25     Years: 20.00     Pack years: 5.00     Types: Cigarettes    Smokeless tobacco: Former     Types: Snuff   Substance and Sexual Activity    Alcohol use: No     Comment: occasionally once a month    Drug use: No     Comment: 1/2 pack a day    Sexual activity: Yes     Partners: Female   Other Topics Concern    Not on file   Social History Narrative    Not on file     Social Determinants of Health     Financial Resource Strain: Not on file   Food Insecurity: Not on file   Transportation Needs: Not on file   Physical Activity: Not on file   Stress: Not on file   Social Connections: Not on file   Intimate Partner Violence: Not on file   Housing Stability: Not on file     Patient Active Problem List   Diagnosis Code    Hypothyroid E03.9    Kidney stone N20.0    Anxiety and depression F41.9, F32. A    Smoker F17.200    HTN (hypertension) I10    TRACY on CPAP G47.33, Z99.89    T2DM (type 2 diabetes mellitus) (HCC) E11.9    Poor dentition K08.9    Stress and adjustment reaction F43.29    Hyperlipidemia E78.5          Current Medications/Allergies   Medications and Allergies reviewed:    Current Outpatient Medications   Medication Sig Dispense Refill    levothyroxine (SYNTHROID) 150 mcg tablet TAKE 2 TABLETS BY MOUTH ONCE DAILY BEFORE BREAKFAST 30 Tablet 0    hydrOXYzine HCL (ATARAX) 50 mg tablet TAKE 2 TABLETS BY MOUTH EVERY 6 HOURS AS NEEDED FOR ANXIETY 60 Tablet 2    metFORMIN (GLUCOPHAGE) 1,000 mg tablet Take 1 Tablet by mouth two (2) times a day.  180 Tablet 3    bisoprolol-hydroCHLOROthiazide (ZIAC) 5-6.25 mg per tablet Take 1 Tablet by mouth daily. 90 Tablet 1    atorvastatin (LIPITOR) 80 mg tablet Take 1 Tablet by mouth nightly. 90 Tablet 3    buPROPion SR (WELLBUTRIN SR) 150 mg SR tablet Take 1 Tablet by mouth daily. 90 Tablet 3    busPIRone (BUSPAR) 15 mg tablet Take 1 Tablet by mouth two (2) times a day. 180 Tablet 3    glipiZIDE SR (GLUCOTROL XL) 10 mg CR tablet Take 1 Tablet by mouth daily.  90 Tablet 3    omeprazole (PRILOSEC) 40 mg capsule Take 1 capsule by mouth once daily 90 Capsule 0    Blood-Glucose Meter monitoring kit Use once a day 1 Kit 0    Lancets misc Use once a day with glucometer 1 Each 11    glucose blood VI test strips (BLOOD GLUCOSE TEST) strip Use once a day with glucometer 100 Strip 1     No Known Allergies

## 2022-12-18 NOTE — PROGRESS NOTES
[address] Medicine Residency Attending Addendum:  Dr. Sachi Woodall, DO,  the patient and I were not physically present during this encounter. The resident and I are concurrently monitoring the patient care through appropriate telecommunication technology. I discussed the findings, assessment and plan with the resident and agree with the resident's findings and plan as documented in the resident's note.       Augusto Botello MD

## 2023-01-24 DIAGNOSIS — E03.9 ACQUIRED HYPOTHYROIDISM: ICD-10-CM

## 2023-01-24 RX ORDER — LEVOTHYROXINE SODIUM 150 UG/1
TABLET ORAL
Qty: 30 TABLET | Refills: 0 | Status: SHIPPED | OUTPATIENT
Start: 2023-01-24

## 2023-01-24 NOTE — TELEPHONE ENCOUNTER
Patient called stating that he is out of his medication and needs it to be refilled soon. He is scheduled for a lab appointment tomorrow at 9:15. Thanks!

## 2023-01-25 ENCOUNTER — LAB ONLY (OUTPATIENT)
Dept: FAMILY MEDICINE CLINIC | Age: 53
End: 2023-01-25

## 2023-01-25 DIAGNOSIS — E11.29 TYPE 2 DIABETES MELLITUS WITH MICROALBUMINURIA, WITHOUT LONG-TERM CURRENT USE OF INSULIN (HCC): ICD-10-CM

## 2023-01-25 DIAGNOSIS — I10 PRIMARY HYPERTENSION: ICD-10-CM

## 2023-01-25 DIAGNOSIS — E78.5 HYPERLIPIDEMIA, UNSPECIFIED HYPERLIPIDEMIA TYPE: ICD-10-CM

## 2023-01-25 DIAGNOSIS — R80.9 TYPE 2 DIABETES MELLITUS WITH MICROALBUMINURIA, WITHOUT LONG-TERM CURRENT USE OF INSULIN (HCC): ICD-10-CM

## 2023-01-25 DIAGNOSIS — E03.9 ACQUIRED HYPOTHYROIDISM: ICD-10-CM

## 2023-01-26 DIAGNOSIS — K21.9 GASTROESOPHAGEAL REFLUX DISEASE WITHOUT ESOPHAGITIS: ICD-10-CM

## 2023-01-26 LAB
ANION GAP SERPL CALC-SCNC: 7 MMOL/L (ref 5–15)
BUN SERPL-MCNC: 13 MG/DL (ref 6–20)
BUN/CREAT SERPL: 12 (ref 12–20)
CALCIUM SERPL-MCNC: 9 MG/DL (ref 8.5–10.1)
CHLORIDE SERPL-SCNC: 101 MMOL/L (ref 97–108)
CHOLEST SERPL-MCNC: 122 MG/DL
CO2 SERPL-SCNC: 28 MMOL/L (ref 21–32)
CREAT SERPL-MCNC: 1.07 MG/DL (ref 0.7–1.3)
EST. AVERAGE GLUCOSE BLD GHB EST-MCNC: 252 MG/DL
GLUCOSE SERPL-MCNC: 355 MG/DL (ref 65–100)
HBA1C MFR BLD: 10.4 % (ref 4–5.6)
HDLC SERPL-MCNC: 30 MG/DL
HDLC SERPL: 4.1 (ref 0–5)
LDLC SERPL CALC-MCNC: 46.2 MG/DL (ref 0–100)
POTASSIUM SERPL-SCNC: 4.3 MMOL/L (ref 3.5–5.1)
SODIUM SERPL-SCNC: 136 MMOL/L (ref 136–145)
TRIGL SERPL-MCNC: 229 MG/DL (ref ?–150)
TSH SERPL DL<=0.05 MIU/L-ACNC: 1.11 UIU/ML (ref 0.36–3.74)
VLDLC SERPL CALC-MCNC: 45.8 MG/DL

## 2023-01-27 DIAGNOSIS — F41.1 GAD (GENERALIZED ANXIETY DISORDER): ICD-10-CM

## 2023-01-27 RX ORDER — OMEPRAZOLE 40 MG/1
CAPSULE, DELAYED RELEASE ORAL
Qty: 90 CAPSULE | Refills: 0 | Status: SHIPPED | OUTPATIENT
Start: 2023-01-27

## 2023-01-27 RX ORDER — HYDROXYZINE 50 MG/1
TABLET, FILM COATED ORAL
Qty: 60 TABLET | Refills: 2 | Status: SHIPPED | OUTPATIENT
Start: 2023-01-27

## 2023-01-29 NOTE — PROGRESS NOTES
TSH wnl. Gluc on  and A1c increased to 10.4 since last year (was 8.3). Will need DM follow up in clinic. Likely needs to start insulin, already maxed out on metformin and glipizide. Continue synthroid at current dose. Message sent to .

## 2023-02-17 ENCOUNTER — TELEPHONE (OUTPATIENT)
Dept: FAMILY MEDICINE CLINIC | Age: 53
End: 2023-02-17

## 2023-02-17 NOTE — TELEPHONE ENCOUNTER
----- Message from [facility], DO sent at 1/29/2023 12:07 PM EST -----  TSH wnl. Gluc on  and A1c increased to 10.4 since last year (was 8.3). Will need DM follow up in clinic. Likely needs to start insulin, already maxed out on metformin and glipizide. Continue synthroid at current dose. Message sent to .

## 2023-04-16 NOTE — TELEPHONE ENCOUNTER
[phone]  Spoke with the father who will have the patient to call the office. A&Ox3, resp wnl, c/o nose bleed since last night, pt went to urgent care was told to go to ED for cautery, pt has gauze in left nare, denies blood thinners

## 2023-05-01 DIAGNOSIS — F41.1 GAD (GENERALIZED ANXIETY DISORDER): ICD-10-CM

## 2023-05-01 RX ORDER — HYDROXYZINE 50 MG/1
TABLET, FILM COATED ORAL
Qty: 60 TABLET | Refills: 0 | Status: SHIPPED | OUTPATIENT
Start: 2023-05-01

## 2023-05-15 NOTE — TELEPHONE ENCOUNTER
Pharmacy is looking for a refill on:    Bisoprolol/HCTZ 5-6.25mg 1 tab daily. Buspirone 15mg 1 tab twice daily    Thanks!

## 2023-05-17 RX ORDER — BISOPROLOL FUMARATE AND HYDROCHLOROTHIAZIDE 5; 6.25 MG/1; MG/1
1 TABLET ORAL DAILY
Qty: 90 TABLET | Refills: 0 | Status: SHIPPED | OUTPATIENT
Start: 2023-05-17

## 2023-05-17 RX ORDER — BUSPIRONE HYDROCHLORIDE 15 MG/1
15 TABLET ORAL 2 TIMES DAILY
Qty: 90 TABLET | Refills: 1 | Status: SHIPPED | OUTPATIENT
Start: 2023-05-17

## 2023-05-25 RX ORDER — BISOPROLOL FUMARATE AND HYDROCHLOROTHIAZIDE 5; 6.25 MG/1; MG/1
1 TABLET ORAL DAILY
Qty: 90 TABLET | Refills: 0 | OUTPATIENT
Start: 2023-05-25

## 2023-05-25 RX ORDER — BUSPIRONE HYDROCHLORIDE 15 MG/1
15 TABLET ORAL 2 TIMES DAILY
Qty: 90 TABLET | Refills: 1 | OUTPATIENT
Start: 2023-05-25

## 2023-05-31 RX ORDER — LEVOTHYROXINE SODIUM 0.15 MG/1
TABLET ORAL
Qty: 90 TABLET | Refills: 0 | Status: SHIPPED | OUTPATIENT
Start: 2023-05-31

## 2023-06-07 RX ORDER — HYDROXYZINE 50 MG/1
TABLET, FILM COATED ORAL
Qty: 60 TABLET | Refills: 0 | Status: SHIPPED | OUTPATIENT
Start: 2023-06-07

## 2023-06-08 NOTE — TELEPHONE ENCOUNTER
----- Message from Clydemarvin Curiel sent at 6/7/2023  2:26 PM EDT -----  Subject: Refill Request    QUESTIONS  Name of Medication? hydrOXYzine HCl (ATARAX) 50 MG tablet  Patient-reported dosage and instructions? 50 mg tablet twice daily   How many days do you have left? 1  Preferred Pharmacy? [facility]  Pharmacy phone number (if available)? [phone]  ---------------------------------------------------------------------------  --------------  Aimee Garcia INFO  What is the best way for the office to contact you? OK to leave message on   voicemail  Preferred Call Back Phone Number? [phone]  ---------------------------------------------------------------------------  --------------  SCRIPT ANSWERS  Relationship to Patient?  Self

## 2023-06-09 RX ORDER — HYDROXYZINE 50 MG/1
TABLET, FILM COATED ORAL
Qty: 60 TABLET | Refills: 0 | OUTPATIENT
Start: 2023-06-09

## 2023-07-10 RX ORDER — HYDROXYZINE 50 MG/1
TABLET, FILM COATED ORAL
Qty: 90 TABLET | Refills: 0 | Status: SHIPPED | OUTPATIENT
Start: 2023-07-10 | End: 2023-08-22 | Stop reason: SDUPTHER

## 2023-07-12 RX ORDER — BUPROPION HYDROCHLORIDE 150 MG/1
150 TABLET, EXTENDED RELEASE ORAL DAILY
Qty: 60 TABLET | Refills: 1 | Status: SHIPPED | OUTPATIENT
Start: 2023-07-12

## 2023-07-12 RX ORDER — BUSPIRONE HYDROCHLORIDE 15 MG/1
15 TABLET ORAL 2 TIMES DAILY
Qty: 90 TABLET | Refills: 1 | Status: CANCELLED | OUTPATIENT
Start: 2023-07-12

## 2023-07-13 RX ORDER — BUPROPION HYDROCHLORIDE 150 MG/1
TABLET, EXTENDED RELEASE ORAL
Qty: 90 TABLET | Refills: 0 | OUTPATIENT
Start: 2023-07-13

## 2023-07-13 RX ORDER — HYDROXYZINE 50 MG/1
TABLET, FILM COATED ORAL
Qty: 60 TABLET | Refills: 0 | OUTPATIENT
Start: 2023-07-13

## 2023-07-18 ENCOUNTER — TELEPHONE (OUTPATIENT)
Age: 53
End: 2023-07-18

## 2023-07-18 NOTE — TELEPHONE ENCOUNTER
Attempted to call patient and schedule appointment for a physical. [phone] number is out of service and unable to leave voicemail on [phone]. No

## 2023-07-18 NOTE — TELEPHONE ENCOUNTER
Patient states he is out of his Glipizide ER 10mg 1 tab daily and Atorvastatin 80mg 1 tab daily. Please send to [facility]. Thanks!

## 2023-07-24 RX ORDER — BUPROPION HYDROCHLORIDE 150 MG/1
150 TABLET, EXTENDED RELEASE ORAL DAILY
Qty: 60 TABLET | Refills: 1 | OUTPATIENT
Start: 2023-07-24

## 2023-07-24 RX ORDER — ATORVASTATIN CALCIUM 80 MG/1
TABLET, FILM COATED ORAL
Qty: 30 TABLET | OUTPATIENT
Start: 2023-07-24

## 2023-07-24 RX ORDER — GLIPIZIDE 10 MG/1
TABLET, FILM COATED, EXTENDED RELEASE ORAL DAILY
Qty: 30 TABLET | OUTPATIENT
Start: 2023-07-24

## 2023-07-25 RX ORDER — LEVOTHYROXINE SODIUM 0.15 MG/1
TABLET ORAL
Qty: 90 TABLET | Refills: 0 | Status: SHIPPED | OUTPATIENT
Start: 2023-07-25

## 2023-08-02 ENCOUNTER — OFFICE VISIT (OUTPATIENT)
Age: 53
End: 2023-08-02

## 2023-08-02 VITALS
TEMPERATURE: 98 F | HEIGHT: 71 IN | WEIGHT: 195 LBS | DIASTOLIC BLOOD PRESSURE: 83 MMHG | SYSTOLIC BLOOD PRESSURE: 169 MMHG | BODY MASS INDEX: 27.3 KG/M2 | HEART RATE: 64 BPM | RESPIRATION RATE: 18 BRPM | OXYGEN SATURATION: 97 %

## 2023-08-02 DIAGNOSIS — I10 ESSENTIAL (PRIMARY) HYPERTENSION: ICD-10-CM

## 2023-08-02 DIAGNOSIS — E78.2 MIXED HYPERLIPIDEMIA: ICD-10-CM

## 2023-08-02 DIAGNOSIS — E03.9 HYPOTHYROIDISM, UNSPECIFIED TYPE: ICD-10-CM

## 2023-08-02 DIAGNOSIS — F41.9 ANXIETY AND DEPRESSION: ICD-10-CM

## 2023-08-02 DIAGNOSIS — F32.A ANXIETY AND DEPRESSION: ICD-10-CM

## 2023-08-02 DIAGNOSIS — E11.9 TYPE 2 DIABETES MELLITUS WITHOUT COMPLICATION, WITHOUT LONG-TERM CURRENT USE OF INSULIN (HCC): Primary | ICD-10-CM

## 2023-08-02 DIAGNOSIS — Z00.01 ENCOUNTER FOR ANNUAL GENERAL MEDICAL EXAMINATION WITH ABNORMAL FINDINGS IN ADULT: ICD-10-CM

## 2023-08-02 DIAGNOSIS — K08.9 POOR DENTITION: ICD-10-CM

## 2023-08-02 LAB
CREAT UR-MCNC: 56.4 MG/DL
MICROALBUMIN UR-MCNC: <0.5 MG/DL
MICROALBUMIN/CREAT UR-RTO: <9 MG/G (ref 0–30)

## 2023-08-02 PROCEDURE — 99396 PREV VISIT EST AGE 40-64: CPT

## 2023-08-02 PROCEDURE — 3046F HEMOGLOBIN A1C LEVEL >9.0%: CPT | Performed by: STUDENT IN AN ORGANIZED HEALTH CARE EDUCATION/TRAINING PROGRAM

## 2023-08-02 PROCEDURE — 99214 OFFICE O/P EST MOD 30 MIN: CPT

## 2023-08-02 PROCEDURE — 3074F SYST BP LT 130 MM HG: CPT | Performed by: STUDENT IN AN ORGANIZED HEALTH CARE EDUCATION/TRAINING PROGRAM

## 2023-08-02 PROCEDURE — 3078F DIAST BP <80 MM HG: CPT | Performed by: STUDENT IN AN ORGANIZED HEALTH CARE EDUCATION/TRAINING PROGRAM

## 2023-08-02 RX ORDER — LEVOTHYROXINE SODIUM 0.15 MG/1
TABLET ORAL
Qty: 90 TABLET | Refills: 0 | Status: SHIPPED | OUTPATIENT
Start: 2023-08-02

## 2023-08-02 RX ORDER — ATORVASTATIN CALCIUM 80 MG/1
80 TABLET, FILM COATED ORAL DAILY
Qty: 90 TABLET | Refills: 1 | Status: SHIPPED | OUTPATIENT
Start: 2023-08-02

## 2023-08-02 RX ORDER — ASPIRIN 81 MG/1
81 TABLET ORAL DAILY
COMMUNITY

## 2023-08-02 RX ORDER — GLIPIZIDE 10 MG/1
10 TABLET, FILM COATED, EXTENDED RELEASE ORAL DAILY
Qty: 30 TABLET | Refills: 0 | Status: SHIPPED | OUTPATIENT
Start: 2023-08-02

## 2023-08-02 RX ORDER — BUSPIRONE HYDROCHLORIDE 15 MG/1
15 TABLET ORAL 2 TIMES DAILY
Qty: 90 TABLET | Refills: 1 | Status: SHIPPED | OUTPATIENT
Start: 2023-08-02

## 2023-08-02 RX ORDER — BUPROPION HYDROCHLORIDE 150 MG/1
150 TABLET, EXTENDED RELEASE ORAL DAILY
Qty: 90 TABLET | Refills: 1 | Status: SHIPPED | OUTPATIENT
Start: 2023-08-02

## 2023-08-02 RX ORDER — LANCETS 30 GAUGE
EACH MISCELLANEOUS
Qty: 100 EACH | Refills: 1 | Status: SHIPPED | OUTPATIENT
Start: 2023-08-02

## 2023-08-02 SDOH — ECONOMIC STABILITY: FOOD INSECURITY: WITHIN THE PAST 12 MONTHS, THE FOOD YOU BOUGHT JUST DIDN'T LAST AND YOU DIDN'T HAVE MONEY TO GET MORE.: SOMETIMES TRUE

## 2023-08-02 SDOH — ECONOMIC STABILITY: FOOD INSECURITY: WITHIN THE PAST 12 MONTHS, YOU WORRIED THAT YOUR FOOD WOULD RUN OUT BEFORE YOU GOT MONEY TO BUY MORE.: SOMETIMES TRUE

## 2023-08-02 SDOH — ECONOMIC STABILITY: INCOME INSECURITY: HOW HARD IS IT FOR YOU TO PAY FOR THE VERY BASICS LIKE FOOD, HOUSING, MEDICAL CARE, AND HEATING?: SOMEWHAT HARD

## 2023-08-02 SDOH — ECONOMIC STABILITY: HOUSING INSECURITY
IN THE LAST 12 MONTHS, WAS THERE A TIME WHEN YOU DID NOT HAVE A STEADY PLACE TO SLEEP OR SLEPT IN A SHELTER (INCLUDING NOW)?: NO

## 2023-08-02 ASSESSMENT — ANXIETY QUESTIONNAIRES
5. BEING SO RESTLESS THAT IT IS HARD TO SIT STILL: 1
2. NOT BEING ABLE TO STOP OR CONTROL WORRYING: 1
IF YOU CHECKED OFF ANY PROBLEMS ON THIS QUESTIONNAIRE, HOW DIFFICULT HAVE THESE PROBLEMS MADE IT FOR YOU TO DO YOUR WORK, TAKE CARE OF THINGS AT HOME, OR GET ALONG WITH OTHER PEOPLE: SOMEWHAT DIFFICULT
3. WORRYING TOO MUCH ABOUT DIFFERENT THINGS: 2
7. FEELING AFRAID AS IF SOMETHING AWFUL MIGHT HAPPEN: 1
1. FEELING NERVOUS, ANXIOUS, OR ON EDGE: 1
GAD7 TOTAL SCORE: 11
6. BECOMING EASILY ANNOYED OR IRRITABLE: 3
4. TROUBLE RELAXING: 2

## 2023-08-02 ASSESSMENT — PATIENT HEALTH QUESTIONNAIRE - PHQ9
6. FEELING BAD ABOUT YOURSELF - OR THAT YOU ARE A FAILURE OR HAVE LET YOURSELF OR YOUR FAMILY DOWN: 1
3. TROUBLE FALLING OR STAYING ASLEEP: 1
2. FEELING DOWN, DEPRESSED OR HOPELESS: 1
1. LITTLE INTEREST OR PLEASURE IN DOING THINGS: 1
8. MOVING OR SPEAKING SO SLOWLY THAT OTHER PEOPLE COULD HAVE NOTICED. OR THE OPPOSITE, BEING SO FIGETY OR RESTLESS THAT YOU HAVE BEEN MOVING AROUND A LOT MORE THAN USUAL: 1
9. THOUGHTS THAT YOU WOULD BE BETTER OFF DEAD, OR OF HURTING YOURSELF: 0
4. FEELING TIRED OR HAVING LITTLE ENERGY: 1
SUM OF ALL RESPONSES TO PHQ9 QUESTIONS 1 & 2: 2
7. TROUBLE CONCENTRATING ON THINGS, SUCH AS READING THE NEWSPAPER OR WATCHING TELEVISION: 1
SUM OF ALL RESPONSES TO PHQ QUESTIONS 1-9: 8
5. POOR APPETITE OR OVEREATING: 1
SUM OF ALL RESPONSES TO PHQ QUESTIONS 1-9: 8
10. IF YOU CHECKED OFF ANY PROBLEMS, HOW DIFFICULT HAVE THESE PROBLEMS MADE IT FOR YOU TO DO YOUR WORK, TAKE CARE OF THINGS AT HOME, OR GET ALONG WITH OTHER PEOPLE: 1
SUM OF ALL RESPONSES TO PHQ QUESTIONS 1-9: 8
SUM OF ALL RESPONSES TO PHQ QUESTIONS 1-9: 8

## 2023-08-02 NOTE — PATIENT INSTRUCTIONS
Please measure your blood sugars and blood pressure at home in the morning and bring in to your next visit in one month. Cut out sodas and energy drinks (redbull) from your diet. We will discuss any medication changes at the next visit. Dentist Resources:  [facility]  To schedule an appointment, call [phone]. Select option 2 for urgent care.

## 2023-08-03 ENCOUNTER — TELEPHONE (OUTPATIENT)
Age: 53
End: 2023-08-03

## 2023-08-03 LAB
ALBUMIN SERPL-MCNC: 3.9 G/DL (ref 3.5–5)
ALBUMIN/GLOB SERPL: 1.2 (ref 1.1–2.2)
ALP SERPL-CCNC: 78 U/L (ref 45–117)
ALT SERPL-CCNC: 75 U/L (ref 12–78)
ANION GAP SERPL CALC-SCNC: 7 MMOL/L (ref 5–15)
AST SERPL-CCNC: 37 U/L (ref 15–37)
BILIRUB SERPL-MCNC: 0.6 MG/DL (ref 0.2–1)
BUN SERPL-MCNC: 8 MG/DL (ref 6–20)
BUN/CREAT SERPL: 9 (ref 12–20)
CALCIUM SERPL-MCNC: 9.3 MG/DL (ref 8.5–10.1)
CHLORIDE SERPL-SCNC: 100 MMOL/L (ref 97–108)
CHOLEST SERPL-MCNC: 115 MG/DL
CO2 SERPL-SCNC: 28 MMOL/L (ref 21–32)
CREAT SERPL-MCNC: 0.91 MG/DL (ref 0.7–1.3)
ERYTHROCYTE [DISTWIDTH] IN BLOOD BY AUTOMATED COUNT: 12.8 % (ref 11.5–14.5)
EST. AVERAGE GLUCOSE BLD GHB EST-MCNC: 226 MG/DL
GLOBULIN SER CALC-MCNC: 3.2 G/DL (ref 2–4)
GLUCOSE SERPL-MCNC: 292 MG/DL (ref 65–100)
HBA1C MFR BLD: 9.5 % (ref 4–5.6)
HCT VFR BLD AUTO: 43 % (ref 36.6–50.3)
HDLC SERPL-MCNC: 32 MG/DL
HDLC SERPL: 3.6 (ref 0–5)
HGB BLD-MCNC: 14.1 G/DL (ref 12.1–17)
LDLC SERPL CALC-MCNC: 59.8 MG/DL (ref 0–100)
MCH RBC QN AUTO: 28.2 PG (ref 26–34)
MCHC RBC AUTO-ENTMCNC: 32.8 G/DL (ref 30–36.5)
MCV RBC AUTO: 86 FL (ref 80–99)
NRBC # BLD: 0 K/UL (ref 0–0.01)
NRBC BLD-RTO: 0 PER 100 WBC
PLATELET # BLD AUTO: 174 K/UL (ref 150–400)
PMV BLD AUTO: 10.5 FL (ref 8.9–12.9)
POTASSIUM SERPL-SCNC: 4.4 MMOL/L (ref 3.5–5.1)
PROT SERPL-MCNC: 7.1 G/DL (ref 6.4–8.2)
RBC # BLD AUTO: 5 M/UL (ref 4.1–5.7)
SODIUM SERPL-SCNC: 135 MMOL/L (ref 136–145)
TRIGL SERPL-MCNC: 116 MG/DL
TSH SERPL DL<=0.05 MIU/L-ACNC: 0.39 UIU/ML (ref 0.36–3.74)
VLDLC SERPL CALC-MCNC: 23.2 MG/DL
WBC # BLD AUTO: 7.3 K/UL (ref 4.1–11.1)

## 2023-08-03 NOTE — TELEPHONE ENCOUNTER
Spoke with Patient who identified pt with two pt identifiers(name and ). Chief Complaint/Subjective: Checked blood sugar this morning, 311, only had coffee with pecan sweetener. Checked blood glucose one hour later: 233. Current Symptoms: Trouble sleeping last night with chest pain. No chest pain currently. Had 3 boiled eggs mid-morning    Associated Symptoms: None    Temperature: Not assessed     BP: 169/83 (yesterday)          140/88  HR - 67 (today)    Pain Severity: 0    Location:    Pain Quality: N/A    Onset: Last night    LMP: NA Pregnant: NA      Patient called this morning and spoke with PSR and stated that his blood sugar was elevated and that he had chest pain. This nurse called and spoke with patient and he stated that after he had his coffee this morning that his blood sugar was 311. Asked if coffee was black or had any sugar/sweetener added; patient endorsed sweetener added. After 1 hour, he rechecked his blood sugar and it went down to 233 and wanted to know if this was normal. I advised patient that if he had checked blood sugar directly after his coffee and then waited for an hour, that the sugar would have been given some time to leave his system. Also, patient asked if eating boiled eggs would help to decrease his blood sugar. I advised him that when consuming any food that blood sugar will increase some, but that the protein in the boiled eggs would help minimize blood sugar spikes. Patient also stated that last night he was having difficulty sleeping, tossing and turning, and having some chest pain that he stated he thought was his acid reflux. Patient denies any chest pain or cardiac symptoms at this time.

## 2023-08-03 NOTE — TELEPHONE ENCOUNTER
Pt completed a physical yesterday with Dr. Emilee Sherwood. He stated that he was diagnosed with diabetes. He is concerned due to his glucose reading as 311 and stated that he chest has been hurting. This writer attempted to connect with NT; however, there was no answer. Pt is requesting a returned phone call.

## 2023-08-06 ASSESSMENT — ENCOUNTER SYMPTOMS
COLOR CHANGE: 0
DIARRHEA: 0
NAUSEA: 0
SHORTNESS OF BREATH: 0
RHINORRHEA: 0
CHEST TIGHTNESS: 0
CONSTIPATION: 0
COUGH: 0
SORE THROAT: 0
VOMITING: 0
BLOOD IN STOOL: 0
ABDOMINAL PAIN: 0

## 2023-08-08 ENCOUNTER — TELEPHONE (OUTPATIENT)
Age: 53
End: 2023-08-08

## 2023-08-08 NOTE — TELEPHONE ENCOUNTER
Labs reviewed with pt on phone call. Discussed elevated A1c and emphasized lifestyle modification, adherence to medications and home measurements of blood sugars. Discussed that medication changes are possible at the next visit including starting insulin. Reminded to record blood pressures at home. Explained cardiac risk factors - and ER precautions for chest pain, shortness of breath, weakness, numbness. Advised to complete application for carecard and set up appointment with [facility]. All questions addressed.

## 2023-08-11 ENCOUNTER — TELEPHONE (OUTPATIENT)
Age: 53
End: 2023-08-11

## 2023-08-11 NOTE — TELEPHONE ENCOUNTER
[address] [place] [address] is requesting a refill for the following medication:    Hydroxyzine HCL 50mg tablets with a 60 days supply.

## 2023-08-14 RX ORDER — BISOPROLOL FUMARATE AND HYDROCHLOROTHIAZIDE 5; 6.25 MG/1; MG/1
TABLET ORAL
Qty: 90 TABLET | Refills: 0 | OUTPATIENT
Start: 2023-08-14

## 2023-08-16 RX ORDER — BISOPROLOL FUMARATE AND HYDROCHLOROTHIAZIDE 5; 6.25 MG/1; MG/1
TABLET ORAL
Qty: 90 TABLET | Refills: 0 | OUTPATIENT
Start: 2023-08-16

## 2023-08-21 ENCOUNTER — TELEPHONE (OUTPATIENT)
Age: 53
End: 2023-08-21

## 2023-08-21 NOTE — TELEPHONE ENCOUNTER
Patient called expressing frustration about medications not being filled correctly. He stated that he needs refills on Atorvastatin 80 mg, Glipizide 10 mg extended release, Hydroxyzine HCl 50 mg, and Bisoprolol-Hydrochlorothiazide 5-6.25 mg. He stated that he has been out of the Bisoprolol-Hydrochlorothiazide for a few days now and high bp has been high without it. He also stated that he discussed getting his medications getting filled for 3 months with the doctor. Would like for prescriptions to be sent to [facility] [place] [facility]. Thanks!

## 2023-08-21 NOTE — PATIENT INSTRUCTIONS
- check BP at home and bring readings for review  - Exercise at least 150 mins/week. It has been shown to increase the lifespan of diabetics. I encourage an active lifestyle that includes regular exercise. - Follow a diabetic diet. For diet information go to www. EATRIGHT. org or call ([phone] to speak with a dietician at [place] [address]  - Diabetes is the leading cause of blindness in the [place] It is important that you see an eye doctor every year for a dilated retinal exam.    - You may benefit from the [facility] at [place] [address]. For more information, please call [phone]  - it is very important that you see a counselor, call your community service board     Learning About Diabetes Food Guidelines  Your Care Instructions  Meal planning is important to manage diabetes. It helps keep your blood sugar at a target level (which you set with your doctor). You don't have to eat special foods. You can eat what your family eats, including sweets once in a while. But you do have to pay attention to how often you eat and how much you eat of certain foods. You may want to work with a dietitian or a certified diabetes educator (CDE) to help you plan meals and snacks. A dietitian or CDE can also help you lose weight if that is one of your goals. What should you know about eating carbs? Managing the amount of carbohydrate (carbs) you eat is an important part of healthy meals when you have diabetes. Carbohydrate is found in many foods. · Learn which foods have carbs. And learn the amounts of carbs in different foods. ¨ Bread, cereal, pasta, and rice have about 15 grams of carbs in a serving. A serving is 1 slice of bread (1 ounce), ½ cup of cooked cereal, or 1/3 cup of cooked pasta or rice. ¨ Fruits have 15 grams of carbs in a serving.  A serving is 1 small fresh fruit, such as an apple or orange; ½ of a banana; ½ cup of cooked or canned fruit; ½ cup of fruit juice; 1 cup of RN returning pt's triage call, but unable to reach patient; voicemail left for patient that she does not have to fast for lab work and to return call to clinic with any questions or concerns.     melon or raspberries; or 2 tablespoons of dried fruit. ¨ Milk and no-sugar-added yogurt have 15 grams of carbs in a serving. A serving is 1 cup of milk or 2/3 cup of no-sugar-added yogurt. ¨ Starchy vegetables have 15 grams of carbs in a serving. A serving is ½ cup of mashed potatoes or sweet potato; 1 cup winter squash; ½ of a small baked potato; ½ cup of cooked beans; or ½ cup cooked corn or green peas. · Learn how much carbs to eat each day and at each meal. A dietitian or CDE can teach you how to keep track of the amount of carbs you eat. This is called carbohydrate counting. · If you are not sure how to count carbohydrate grams, use the Plate Method to plan meals. It is a good, quick way to make sure that you have a balanced meal. It also helps you spread carbs throughout the day. ¨ Divide your plate by types of foods. Put non-starchy vegetables on half the plate, meat or other protein food on one-quarter of the plate, and a grain or starchy vegetable in the final quarter of the plate. To this you can add a small piece of fruit and 1 cup of milk or yogurt, depending on how many carbs you are supposed to eat at a meal.  · Try to eat about the same amount of carbs at each meal. Do not \"save up\" your daily allowance of carbs to eat at one meal.  · Proteins have very little or no carbs per serving. Examples of proteins are beef, chicken, turkey, fish, eggs, tofu, cheese, cottage cheese, and peanut butter. A serving size of meat is 3 ounces, which is about the size of a deck of cards. Examples of meat substitute serving sizes (equal to 1 ounce of meat) are 1/4 cup of cottage cheese, 1 egg, 1 tablespoon of peanut butter, and ½ cup of tofu. How can you eat out and still eat healthy? · Learn to estimate the serving sizes of foods that have carbohydrate. If you measure food at home, it will be easier to estimate the amount in a serving of restaurant food.   · If the meal you order has too much carbohydrate (such as potatoes, corn, or baked beans), ask to have a low-carbohydrate food instead. Ask for a salad or green vegetables. · If you use insulin, check your blood sugar before and after eating out to help you plan how much to eat in the future. · If you eat more carbohydrate at a meal than you had planned, take a walk or do other exercise. This will help lower your blood sugar. What else should you know? · Limit saturated fat, such as the fat from meat and dairy products. This is a healthy choice because people who have diabetes are at higher risk of heart disease. So choose lean cuts of meat and nonfat or low-fat dairy products. Use olive or canola oil instead of butter or shortening when cooking. · Don't skip meals. Your blood sugar may drop too low if you skip meals and take insulin or certain medicines for diabetes. · Check with your doctor before you drink alcohol. Alcohol can cause your blood sugar to drop too low. Alcohol can also cause a bad reaction if you take certain diabetes medicines. Follow-up care is a key part of your treatment and safety. Be sure to make and go to all appointments, and call your doctor if you are having problems. It's also a good idea to know your test results and keep a list of the medicines you take. Where can you learn more? Go to http://cathleen-filipe.info/. Enter E406 in the search box to learn more about \"Learning About Diabetes Food Guidelines. \"  Current as of: May 23, 2016  Content Version: 11.2  © 5883-0438 [employer]. Care instructions adapted under license by [employer] (which disclaims liability or warranty for this information). If you have questions about a medical condition or this instruction, always ask your healthcare professional. [place] [address] any warranty or liability for your use of this information.

## 2023-08-22 ENCOUNTER — TELEPHONE (OUTPATIENT)
Age: 53
End: 2023-08-22

## 2023-08-22 DIAGNOSIS — I10 ESSENTIAL (PRIMARY) HYPERTENSION: Primary | ICD-10-CM

## 2023-08-22 DIAGNOSIS — E78.2 MIXED HYPERLIPIDEMIA: ICD-10-CM

## 2023-08-22 DIAGNOSIS — E11.9 TYPE 2 DIABETES MELLITUS WITHOUT COMPLICATION, WITHOUT LONG-TERM CURRENT USE OF INSULIN (HCC): ICD-10-CM

## 2023-08-22 RX ORDER — GLIPIZIDE 10 MG/1
10 TABLET, FILM COATED, EXTENDED RELEASE ORAL DAILY
Qty: 90 TABLET | Refills: 0 | Status: SHIPPED | OUTPATIENT
Start: 2023-08-22

## 2023-08-22 RX ORDER — HYDROXYZINE 50 MG/1
TABLET, FILM COATED ORAL
Qty: 90 TABLET | Refills: 2 | Status: SHIPPED | OUTPATIENT
Start: 2023-08-22

## 2023-08-22 RX ORDER — BISOPROLOL FUMARATE AND HYDROCHLOROTHIAZIDE 5; 6.25 MG/1; MG/1
1 TABLET ORAL DAILY
Qty: 90 TABLET | Refills: 0 | Status: SHIPPED | OUTPATIENT
Start: 2023-08-22

## 2023-08-22 RX ORDER — HYDROXYZINE 50 MG/1
TABLET, FILM COATED ORAL
Qty: 90 TABLET | Refills: 0 | OUTPATIENT
Start: 2023-08-22

## 2023-08-22 RX ORDER — ATORVASTATIN CALCIUM 80 MG/1
80 TABLET, FILM COATED ORAL DAILY
Qty: 90 TABLET | Refills: 1 | Status: SHIPPED | OUTPATIENT
Start: 2023-08-22

## 2023-08-22 NOTE — TELEPHONE ENCOUNTER
Medication refills discussed with pt on phone call. Ordered the following: bisoprolol-hctz, hydroxyzine, atorvastatin, glipizide, metformin. Medication management needed at next visit. All questions addressed.

## 2023-08-22 NOTE — TELEPHONE ENCOUNTER
Called Pt on his cell phone to inform that his requested medications have been refilled. No answer, this writer has left voice message with call  back number.

## 2023-08-28 ENCOUNTER — OFFICE VISIT (OUTPATIENT)
Age: 53
End: 2023-08-28

## 2023-08-28 VITALS
OXYGEN SATURATION: 98 % | WEIGHT: 194.8 LBS | SYSTOLIC BLOOD PRESSURE: 115 MMHG | DIASTOLIC BLOOD PRESSURE: 68 MMHG | RESPIRATION RATE: 16 BRPM | BODY MASS INDEX: 27.17 KG/M2

## 2023-08-28 DIAGNOSIS — E03.9 HYPOTHYROIDISM, UNSPECIFIED TYPE: ICD-10-CM

## 2023-08-28 DIAGNOSIS — Z72.0 TOBACCO USE: ICD-10-CM

## 2023-08-28 DIAGNOSIS — F41.1 GENERALIZED ANXIETY DISORDER: ICD-10-CM

## 2023-08-28 DIAGNOSIS — I10 ESSENTIAL (PRIMARY) HYPERTENSION: ICD-10-CM

## 2023-08-28 DIAGNOSIS — E11.65 TYPE 2 DIABETES MELLITUS WITH HYPERGLYCEMIA, WITHOUT LONG-TERM CURRENT USE OF INSULIN (HCC): Primary | ICD-10-CM

## 2023-08-28 DIAGNOSIS — Z59.86 FINANCIAL INSECURITY: ICD-10-CM

## 2023-08-28 PROCEDURE — 3074F SYST BP LT 130 MM HG: CPT

## 2023-08-28 PROCEDURE — 99214 OFFICE O/P EST MOD 30 MIN: CPT

## 2023-08-28 PROCEDURE — 3046F HEMOGLOBIN A1C LEVEL >9.0%: CPT

## 2023-08-28 PROCEDURE — 3078F DIAST BP <80 MM HG: CPT

## 2023-08-28 SDOH — ECONOMIC STABILITY - INCOME SECURITY: FINANCIAL INSECURITY: Z59.86

## 2023-08-28 ASSESSMENT — PATIENT HEALTH QUESTIONNAIRE - PHQ9
2. FEELING DOWN, DEPRESSED OR HOPELESS: 1
SUM OF ALL RESPONSES TO PHQ QUESTIONS 1-9: 2
SUM OF ALL RESPONSES TO PHQ QUESTIONS 1-9: 2
SUM OF ALL RESPONSES TO PHQ9 QUESTIONS 1 & 2: 2
1. LITTLE INTEREST OR PLEASURE IN DOING THINGS: 1
SUM OF ALL RESPONSES TO PHQ QUESTIONS 1-9: 2
SUM OF ALL RESPONSES TO PHQ QUESTIONS 1-9: 2

## 2023-08-28 ASSESSMENT — ANXIETY QUESTIONNAIRES
6. BECOMING EASILY ANNOYED OR IRRITABLE: 3
4. TROUBLE RELAXING: 1
GAD7 TOTAL SCORE: 11
IF YOU CHECKED OFF ANY PROBLEMS ON THIS QUESTIONNAIRE, HOW DIFFICULT HAVE THESE PROBLEMS MADE IT FOR YOU TO DO YOUR WORK, TAKE CARE OF THINGS AT HOME, OR GET ALONG WITH OTHER PEOPLE: SOMEWHAT DIFFICULT
7. FEELING AFRAID AS IF SOMETHING AWFUL MIGHT HAPPEN: 1
3. WORRYING TOO MUCH ABOUT DIFFERENT THINGS: 1
5. BEING SO RESTLESS THAT IT IS HARD TO SIT STILL: 1
2. NOT BEING ABLE TO STOP OR CONTROL WORRYING: 1
1. FEELING NERVOUS, ANXIOUS, OR ON EDGE: 3

## 2023-08-28 ASSESSMENT — ENCOUNTER SYMPTOMS
SHORTNESS OF BREATH: 0
SORE THROAT: 0
SINUS PRESSURE: 0
COUGH: 0
VOMITING: 0
NAUSEA: 0
TROUBLE SWALLOWING: 0
DIARRHEA: 0
ABDOMINAL PAIN: 0

## 2023-08-28 NOTE — PATIENT INSTRUCTIONS
Take your thyroid medication 5 days a week, skipping medication on Sunday and Wednesday. Department of   For help applying for [employer], financial / .   Jonathan Sanz, [address]

## 2023-08-28 NOTE — PROGRESS NOTES
I saw and evaluated the patient, performing the key elements of the service. I discussed the findings, assessment and plan with the resident and agree with the resident's findings and plan as documented in the resident's note.
applying for medicaid and provided resources for local department of  office.  - Advised to apply for care card    5. Tobacco use  Heavy history of tobacco use. Briefly discussed at this visit. Pt prefers to discuss at a later time. - Counseled on cardiovascular risks of smoking  - Discussed options available to help with cravings. 6. Generalized anxiety disorder  Worsened anxiety, mood changes and irritability. Currently on hydroxyzine 50, buspar 15, wellbutrin 150mg. Symptoms possibly attributable to elevated thyroid hormones. - Continue current anxiety medications  - Monitor for symptom improvement after altering levothyroxine dose  - Counseled on benefits of therapy and having coping mechanisms during times of stress, pt does not feel it is beneficial; has tried therapy in the past.    Return in about 4 weeks (around 9/25/2023) for diabetes f/u. Pt was discussed with Dr. Omar Miguel (attending physician). I have reviewed patient medical and social history and medications. I have reviewed pertinent labs results and other data. I have discussed the diagnosis with the patient and the intended plan as seen in the above orders. The patient has received an after-visit summary and questions were answered concerning future plans. I have discussed medication side effects and warnings with the patient as well.     Gaston Powell MD  Resident, [facility]  08/30/23

## 2023-08-30 RX ORDER — BISOPROLOL FUMARATE AND HYDROCHLOROTHIAZIDE 5; 6.25 MG/1; MG/1
1 TABLET ORAL DAILY
Qty: 90 TABLET | Refills: 0 | OUTPATIENT
Start: 2023-08-30

## 2023-08-30 RX ORDER — HUMAN INSULIN 100 [IU]/ML
10 INJECTION, SUSPENSION SUBCUTANEOUS NIGHTLY
Qty: 1 ADJUSTABLE DOSE PRE-FILLED PEN SYRINGE | Refills: 0 | Status: SHIPPED | OUTPATIENT
Start: 2023-08-30

## 2023-08-30 RX ORDER — BLOOD SUGAR DIAGNOSTIC
1 STRIP MISCELLANEOUS DAILY
Qty: 100 EACH | Refills: 0 | Status: SHIPPED | OUTPATIENT
Start: 2023-08-30

## 2023-09-14 RX ORDER — OMEPRAZOLE 40 MG/1
CAPSULE, DELAYED RELEASE ORAL
Qty: 90 CAPSULE | Refills: 0 | Status: SHIPPED | OUTPATIENT
Start: 2023-09-14

## 2023-10-04 ENCOUNTER — TELEPHONE (OUTPATIENT)
Age: 53
End: 2023-10-04

## 2023-10-04 NOTE — TELEPHONE ENCOUNTER
From call center-  Reason for Call: Established Patient Appointment needed: Urgent (Patient   Request) No Script

## 2023-11-08 DIAGNOSIS — F41.9 ANXIETY AND DEPRESSION: ICD-10-CM

## 2023-11-08 DIAGNOSIS — F32.A ANXIETY AND DEPRESSION: ICD-10-CM

## 2023-11-08 RX ORDER — BUPROPION HYDROCHLORIDE 150 MG/1
150 TABLET, EXTENDED RELEASE ORAL DAILY
Qty: 60 TABLET | Refills: 0 | OUTPATIENT
Start: 2023-11-08

## 2023-11-08 NOTE — TELEPHONE ENCOUNTER
Spoke to pharmacist at [email or web address]   They do have rx for 90/1 RF dated 08/2023  They will fill and get ready for pt for Wellbutrin   Pt called and aware

## 2023-11-13 ENCOUNTER — TELEPHONE (OUTPATIENT)
Age: 53
End: 2023-11-13

## 2023-11-13 DIAGNOSIS — E03.9 HYPOTHYROIDISM, UNSPECIFIED TYPE: ICD-10-CM

## 2023-11-13 RX ORDER — LEVOTHYROXINE SODIUM 0.15 MG/1
TABLET ORAL
Qty: 90 TABLET | Refills: 0 | Status: SHIPPED | OUTPATIENT
Start: 2023-11-13 | End: 2023-11-14 | Stop reason: SDUPTHER

## 2023-11-13 NOTE — TELEPHONE ENCOUNTER
Medication Refill Request    Shady Wilman. is requesting a refill of the following medication(s):   Requested Prescriptions     Pending Prescriptions Disp Refills    levothyroxine (SYNTHROID) 150 MCG tablet 90 tablet 0     Sig: TAKE 2 TABLETS BY MOUTH ONCE DAILY BEFORE BREAKFAST        Listed PCP is Kiel Casas MD   Last provider to prescribe medication: Dr. Iraida Guillory  Last Date of Medication Prescribed: 8/2/23   Date of Last Office Visit at [facility]: 8/28/23   FUTURE APPOINTMENT: No future appointments. Please send refill to: Irene, [address] [phone] Jaydon Willoughby [phone]  [facility] [address]  Phone: [phone] Fax: [phone]      Please review request and approve or deny with recommendations.

## 2023-11-13 NOTE — TELEPHONE ENCOUNTER
Pt is requesting a refill of the following medication:    bisoprolol-hydroCHLOROthiazide (ZIAC) 5-6.25 MG per tablet    If appropriate, please send refill to Irene, [address] [facility] [address] [facility] [facility]

## 2023-11-14 DIAGNOSIS — I10 ESSENTIAL (PRIMARY) HYPERTENSION: ICD-10-CM

## 2023-11-14 DIAGNOSIS — E03.9 HYPOTHYROIDISM, UNSPECIFIED TYPE: ICD-10-CM

## 2023-11-14 RX ORDER — BISOPROLOL FUMARATE AND HYDROCHLOROTHIAZIDE 5; 6.25 MG/1; MG/1
1 TABLET ORAL DAILY
Qty: 90 TABLET | Refills: 0 | Status: SHIPPED | OUTPATIENT
Start: 2023-11-14

## 2023-11-14 RX ORDER — LEVOTHYROXINE SODIUM 0.15 MG/1
TABLET ORAL
Qty: 90 TABLET | Refills: 0 | Status: SHIPPED | OUTPATIENT
Start: 2023-11-14

## 2023-11-17 ENCOUNTER — TELEPHONE (OUTPATIENT)
Age: 53
End: 2023-11-17

## 2023-11-17 NOTE — TELEPHONE ENCOUNTER
[facility] sent a PA request for Bisoprolol/HCT 5/6.25 MG Tab dated 11/08/23. Per Pt's chart, the trx was sent to pharmacy on 11/14/23. Called Pt to inquire if the refill for Bisoprplol has been picked up yet. Call went to voice mail, left voice message. [facility] as well to inquire, pharma tech Davina martines firmed that Pt has picked up Bisoprolol already. A PA is not required at this time.

## 2023-11-17 NOTE — TELEPHONE ENCOUNTER
----- Message from Yojana Castillo sent at 11/17/2023  3:50 PM EST -----  Subject: Message to Provider    QUESTIONS  Information for Provider?  Pt called in returning a call he missed   regarding his medication refill request. Please call pt.   ---------------------------------------------------------------------------  --------------  Stiven Roche [facility]  [phone]; OK to leave message on voicemail  ---------------------------------------------------------------------------  --------------  SCRIPT ANSWERS  undefined

## 2023-11-18 ENCOUNTER — TELEPHONE (OUTPATIENT)
Facility: HOSPITAL | Age: 53
End: 2023-11-18

## 2023-11-18 NOTE — TELEPHONE ENCOUNTER
Covering inbox for Dr. Nik Valerio:  Received message pt requesting call back regarding med refill. Per chart review refills for bisoprolol/GCT and levothyroxine sent 11/14; [address] con firmed that Pt has picked up Bisoprolol already. Called pt to confirm this; no answer; ok to leave VM per pt so left VM regarding this.   Néstor Weathers MD

## 2023-11-20 ENCOUNTER — TELEPHONE (OUTPATIENT)
Facility: HOSPITAL | Age: 53
End: 2023-11-20

## 2023-11-20 DIAGNOSIS — F32.A ANXIETY AND DEPRESSION: ICD-10-CM

## 2023-11-20 DIAGNOSIS — F41.9 ANXIETY AND DEPRESSION: ICD-10-CM

## 2023-11-20 RX ORDER — BUSPIRONE HYDROCHLORIDE 15 MG/1
15 TABLET ORAL 2 TIMES DAILY
Qty: 90 TABLET | Refills: 1 | Status: SHIPPED | OUTPATIENT
Start: 2023-11-20

## 2023-11-20 NOTE — TELEPHONE ENCOUNTER
----- Message from Tony Howard sent at 11/20/2023  2:32 PM EST -----  Subject: Medication Problem    Medication: busPIRone (BUSPAR) 15 MG tablet  Dosage: as per Rx  Ordering Provider: alhaji    Question/Problem: Patient has called pharmacy for his refill and they told   him to call PCP. Says he has been out of this for the weekend and needs   this as soon as possible. Please call patient if questions.       Pharmacy: [facility] [address] [facility], [address] [place] [address]    [address] [phone] Aureliano Morocho [phone]    ---------------------------------------------------------------------------  --------------  Vangie LOYOLA  [phone]; OK to leave message on voicemail  ---------------------------------------------------------------------------  --------------    SCRIPT ANSWERS  Relationship to Patient: Self

## 2023-11-20 NOTE — TELEPHONE ENCOUNTER
Refilled Buspar 15mg BID. Reviewed chart; LOV 8/28/23.    Tam Jamil MD. Covering inbox of Dr. Jauregui   11/20/23

## 2023-12-30 DIAGNOSIS — E11.65 TYPE 2 DIABETES MELLITUS WITH HYPERGLYCEMIA, WITHOUT LONG-TERM CURRENT USE OF INSULIN (HCC): ICD-10-CM

## 2023-12-30 NOTE — TELEPHONE ENCOUNTER
Pharmacy requesting refill of Novolin N Flexpen 100 unit inj. Pharmacy correct on file.  Please approve or deny refill request.

## 2023-12-31 RX ORDER — HUMAN INSULIN 100 [IU]/ML
10 INJECTION, SUSPENSION SUBCUTANEOUS NIGHTLY
Qty: 1 ADJUSTABLE DOSE PRE-FILLED PEN SYRINGE | Refills: 3 | Status: SHIPPED | OUTPATIENT
Start: 2023-12-31

## 2024-01-02 ENCOUNTER — TELEPHONE (OUTPATIENT)
Age: 54
End: 2024-01-02

## 2024-01-02 DIAGNOSIS — E11.65 TYPE 2 DIABETES MELLITUS WITH HYPERGLYCEMIA, WITHOUT LONG-TERM CURRENT USE OF INSULIN (HCC): ICD-10-CM

## 2024-01-02 NOTE — TELEPHONE ENCOUNTER
----- Message from Faby Stovall sent at 1/2/2024 11:43 AM EST -----  Subject: Refill Request    QUESTIONS  Name of Medication? insulin NPH (NOVOLIN N FLEXPEN) 100 UNIT/ML injection   pen  Patient-reported dosage and instructions? once a night  How many days do you have left? 0  Preferred Pharmacy? [facility]  Pharmacy phone number (if available)? [phone]  Additional Information for Provider? patient of Dr. Cochran   ---------------------------------------------------------------------------  --------------  CALL BACK INFO  What is the best way for the office to contact you? Do not leave any   message, patient will call back for answer  Preferred Call Back Phone Number? [phone]  ---------------------------------------------------------------------------  --------------  SCRIPT ANSWERS  Relationship to Patient? Self

## 2024-01-04 DIAGNOSIS — I10 ESSENTIAL (PRIMARY) HYPERTENSION: ICD-10-CM

## 2024-01-04 NOTE — TELEPHONE ENCOUNTER
Pharmacy requesting refill of hydroxyzine hcl 50MG tabs. Pharmacy correct on file. Please approve or deny refill request.

## 2024-01-06 RX ORDER — HYDROXYZINE 50 MG/1
TABLET, FILM COATED ORAL
Qty: 90 TABLET | Refills: 2 | Status: SHIPPED | OUTPATIENT
Start: 2024-01-06

## 2024-01-10 ENCOUNTER — TELEPHONE (OUTPATIENT)
Age: 54
End: 2024-01-10

## 2024-01-10 NOTE — TELEPHONE ENCOUNTER
Called [facility] pharmacy to check if they have received the Rx . Pharmacist confirmed that they have received it and it's too soon for another refill.    Called Pt on the phone number as per chart as well to relay the same, no reply, left voice message with call back number.

## 2024-01-10 NOTE — TELEPHONE ENCOUNTER
----- Message from Lacy Vieira sent at 1/5/2024  2:10 PM EST -----  Subject: Medication Problem     Medication: insulin NPH (NOVOLIN N FLEXPEN) 100 UNIT/ML injection pen  Dosage: Inject 10 Units into the skin nightly  Ordering Provider: MARYANN GRUBBS    Question/Problem: Patient states pharmacy did not receive the prescription   that was sent on12/31/23      Pharmacy: [facility] [address] [place] [address] [facility] [phone] - F [phone]    ---------------------------------------------------------------------------  --------------  CALL BACK INFO  [phone]; OK to leave message on voicemail  ---------------------------------------------------------------------------  --------------    SCRIPT ANSWERS  Relationship to Patient: Self

## 2024-02-12 DIAGNOSIS — I10 ESSENTIAL (PRIMARY) HYPERTENSION: ICD-10-CM

## 2024-02-12 NOTE — TELEPHONE ENCOUNTER
Medication Refill Request    Jhonny LEÓN Cook Jr. is requesting a refill of the following medication(s):   Requested Prescriptions     Pending Prescriptions Disp Refills    bisoprolol-hydroCHLOROthiazide (ZIAC) 5-6.25 MG per tablet 90 tablet 0     Sig: Take 1 tablet by mouth daily        Listed PCP is Laina Snow MD   Last provider to prescribe medication: Dr. Naranjo  Last Date of Medication Prescribed: 11/14/2023   Date of Last Office Visit at [facility]: 08/28/2023     FUTURE APPOINTMENT: No future appointments.    Please send refill to:    [facility] [address] [place] [address] [id number] [place] [address] [phone] -  [phone]  [address] [place] [address]  Phone: [phone] Fax: [phone]      Please review request and approve or deny with recommendations.

## 2024-02-14 DIAGNOSIS — E03.9 HYPOTHYROIDISM, UNSPECIFIED TYPE: ICD-10-CM

## 2024-02-14 RX ORDER — BISOPROLOL FUMARATE AND HYDROCHLOROTHIAZIDE 5; 6.25 MG/1; MG/1
1 TABLET ORAL DAILY
Qty: 90 TABLET | Refills: 0 | Status: SHIPPED | OUTPATIENT
Start: 2024-02-14

## 2024-02-14 RX ORDER — LEVOTHYROXINE SODIUM 0.15 MG/1
TABLET ORAL
Qty: 90 TABLET | Refills: 2 | Status: SHIPPED | OUTPATIENT
Start: 2024-02-14

## 2024-02-14 NOTE — TELEPHONE ENCOUNTER
Medication Refill Request    Jhonny TRAORE Verenicedioni  is requesting a refill of the following medication(s):   Requested Prescriptions     Pending Prescriptions Disp Refills    levothyroxine (SYNTHROID) 150 MCG tablet 90 tablet 2     Sig: TAKE 2 TABLETS BY MOUTH ONCE DAILY BEFORE BREAKFAST        Listed PCP is GraLaina Rollins MD   Last provider to prescribe medication: Dr. Naranjo  Last Date of Medication Prescribed: 11/14/2023   Date of Last Office Visit at [facility]: 08/28/2023   FUTURE APPOINTMENT: No future appointments.    Please send refill to:    [facility] [address] [place] [address] [id number] [place] [address] [phone] -  [phone]  [address] [place] [address]  Phone: [phone] Fax: [phone]      Please review request and approve or deny with recommendations.

## 2024-02-22 DIAGNOSIS — F41.9 ANXIETY AND DEPRESSION: ICD-10-CM

## 2024-02-22 DIAGNOSIS — F32.A ANXIETY AND DEPRESSION: ICD-10-CM

## 2024-02-23 RX ORDER — BUSPIRONE HYDROCHLORIDE 15 MG/1
15 TABLET ORAL 2 TIMES DAILY
Qty: 90 TABLET | Refills: 0 | OUTPATIENT
Start: 2024-02-23

## 2024-02-24 DIAGNOSIS — F32.A ANXIETY AND DEPRESSION: ICD-10-CM

## 2024-02-24 DIAGNOSIS — F41.9 ANXIETY AND DEPRESSION: ICD-10-CM

## 2024-02-24 RX ORDER — BUSPIRONE HYDROCHLORIDE 15 MG/1
15 TABLET ORAL 2 TIMES DAILY
Qty: 90 TABLET | Refills: 1 | Status: SHIPPED | OUTPATIENT
Start: 2024-02-24

## 2024-04-17 DIAGNOSIS — E11.65 TYPE 2 DIABETES MELLITUS WITH HYPERGLYCEMIA, WITHOUT LONG-TERM CURRENT USE OF INSULIN (HCC): ICD-10-CM

## 2024-04-17 NOTE — TELEPHONE ENCOUNTER
Medication Refill Request    Jhonny Cook Jr. is requesting a refill of the following medication(s):   Requested Prescriptions     Pending Prescriptions Disp Refills    insulin NPH (NOVOLIN N FLEXPEN) 100 UNIT/ML injection pen 1 Adjustable Dose Pre-filled Pen Syringe 3     Sig: Inject 10 Units into the skin nightly        Listed PCP is Laina Snow MD   Last provider to prescribe medication: Dr. Naranjo  Last Date of Medication Prescribed: 12/31/2023   Date of Last Office Visit at [facility]: 08/28/2023     FUTURE APPOINTMENT: No future appointments.    Please send refill to:    [facility] [address] [place] [address] - [id number] [place] [address] [phone] - F [phone]  [address] [place] [address]  Phone: [phone] Fax: [phone]      Please review request and approve or deny with recommendations.

## 2024-04-22 RX ORDER — HUMAN INSULIN 100 [IU]/ML
10 INJECTION, SUSPENSION SUBCUTANEOUS NIGHTLY
Qty: 1 ADJUSTABLE DOSE PRE-FILLED PEN SYRINGE | Refills: 3 | Status: SHIPPED | OUTPATIENT
Start: 2024-04-22

## 2024-04-24 ENCOUNTER — TELEPHONE (OUTPATIENT)
Age: 54
End: 2024-04-24

## 2024-04-24 NOTE — TELEPHONE ENCOUNTER
----- Message from Laina Naranjo MD sent at 4/22/2024  7:16 AM EDT -----  Regarding: Office visit  Please schedule an office visit - I refilled his insulin for 1 month, but he must be seen in clinic prior to further refills.     Thank you

## 2024-05-06 DIAGNOSIS — F32.A ANXIETY AND DEPRESSION: ICD-10-CM

## 2024-05-06 DIAGNOSIS — F41.9 ANXIETY AND DEPRESSION: ICD-10-CM

## 2024-05-06 DIAGNOSIS — I10 ESSENTIAL (PRIMARY) HYPERTENSION: ICD-10-CM

## 2024-05-06 RX ORDER — BUPROPION HYDROCHLORIDE 150 MG/1
150 TABLET, EXTENDED RELEASE ORAL DAILY
Qty: 90 TABLET | Refills: 1 | Status: SHIPPED | OUTPATIENT
Start: 2024-05-06

## 2024-05-06 RX ORDER — BISOPROLOL FUMARATE AND HYDROCHLOROTHIAZIDE 5; 6.25 MG/1; MG/1
1 TABLET ORAL DAILY
Qty: 90 TABLET | Refills: 0 | Status: SHIPPED | OUTPATIENT
Start: 2024-05-06

## 2024-05-06 NOTE — TELEPHONE ENCOUNTER
A Fax refill request from [facility]    Medication Refill Request     Jhonny TRAORE Joey Paige is requesting a refill of the following medication(s):   Requested Prescriptions     Pending Prescriptions Disp Refills    bisoprolol-hydroCHLOROthiazide (ZIAC) 5-6.25 MG per tablet 90 tablet 0     Sig: Take 1 tablet by mouth daily    buPROPion (WELLBUTRIN SR) 150 MG extended release tablet 90 tablet 1     Sig: Take 1 tablet by mouth daily        Listed PCP is Laina Snow MD   Last provider to prescribe medication: Dr. Naranjo    Bisoprolol-HCTZ:   Last Date of Medication Prescribed: 02/14/2024     Bupropion ER/ MG:  Last Date of Medication Prescribed:08/02/2023    Date of Last Office Visit at [facility]: 08/28/2023     FUTURE APPOINTMENT: No future appointments.    Please send refill to:    [facility] [address] [place] [address] [facility] [phone] -  [phone]  [address] [place] [address]  Phone: [phone] Fax: [phone]      Please review request and approve or deny with recommendations.

## 2024-05-08 NOTE — TELEPHONE ENCOUNTER
Pt stated that he has checked with the pharmacy twice and he continues to be told that [facility] has not received any prescriptions.     Please resend both prescriptions to [facility] in [facility].    Thank you

## 2024-05-13 ENCOUNTER — HOSPITAL ENCOUNTER (EMERGENCY)
Facility: HOSPITAL | Age: 54
Discharge: HOME | End: 2024-05-13
Attending: STUDENT IN AN ORGANIZED HEALTH CARE EDUCATION/TRAINING PROGRAM

## 2024-05-13 ENCOUNTER — APPOINTMENT (OUTPATIENT)
Facility: HOSPITAL | Age: 54
End: 2024-05-13

## 2024-05-13 VITALS
BODY MASS INDEX: 25.56 KG/M2 | HEART RATE: 67 BPM | OXYGEN SATURATION: 97 % | DIASTOLIC BLOOD PRESSURE: 74 MMHG | RESPIRATION RATE: 18 BRPM | SYSTOLIC BLOOD PRESSURE: 119 MMHG | WEIGHT: 178.57 LBS | TEMPERATURE: 98.4 F | HEIGHT: 70 IN

## 2024-05-13 DIAGNOSIS — K57.32 DIVERTICULITIS OF SIGMOID COLON: Primary | ICD-10-CM

## 2024-05-13 DIAGNOSIS — R10.31 RIGHT LOWER QUADRANT ABDOMINAL PAIN: ICD-10-CM

## 2024-05-13 DIAGNOSIS — R11.2 NAUSEA AND VOMITING, UNSPECIFIED VOMITING TYPE: ICD-10-CM

## 2024-05-13 DIAGNOSIS — R10.32 LEFT LOWER QUADRANT ABDOMINAL PAIN: ICD-10-CM

## 2024-05-13 DIAGNOSIS — R19.7 DIARRHEA, UNSPECIFIED TYPE: ICD-10-CM

## 2024-05-13 LAB
ALBUMIN SERPL-MCNC: 3.8 G/DL (ref 3.5–5)
ALBUMIN/GLOB SERPL: 0.9 (ref 1.1–2.2)
ALP SERPL-CCNC: 118 U/L (ref 45–117)
ALT SERPL-CCNC: 26 U/L (ref 12–78)
ANION GAP SERPL CALC-SCNC: 6 MMOL/L (ref 5–15)
APPEARANCE UR: CLEAR
AST SERPL-CCNC: 14 U/L (ref 15–37)
BACTERIA URNS QL MICRO: NEGATIVE /HPF
BASOPHILS # BLD: 0.1 K/UL (ref 0–0.1)
BASOPHILS NFR BLD: 1 % (ref 0–1)
BILIRUB SERPL-MCNC: 2.4 MG/DL (ref 0.2–1)
BILIRUB UR QL: NEGATIVE
BUN SERPL-MCNC: 8 MG/DL (ref 6–20)
BUN/CREAT SERPL: 8 (ref 12–20)
CALCIUM SERPL-MCNC: 9.8 MG/DL (ref 8.5–10.1)
CHLORIDE SERPL-SCNC: 98 MMOL/L (ref 97–108)
CO2 SERPL-SCNC: 29 MMOL/L (ref 21–32)
COLOR UR: ABNORMAL
CREAT SERPL-MCNC: 1.05 MG/DL (ref 0.7–1.3)
DIFFERENTIAL METHOD BLD: ABNORMAL
EOSINOPHIL # BLD: 0.2 K/UL (ref 0–0.4)
EOSINOPHIL NFR BLD: 2 % (ref 0–7)
EPITH CASTS URNS QL MICRO: ABNORMAL /LPF
ERYTHROCYTE [DISTWIDTH] IN BLOOD BY AUTOMATED COUNT: 12.8 % (ref 11.5–14.5)
GLOBULIN SER CALC-MCNC: 4.2 G/DL (ref 2–4)
GLUCOSE SERPL-MCNC: 254 MG/DL (ref 65–100)
GLUCOSE UR STRIP.AUTO-MCNC: NEGATIVE MG/DL
HCT VFR BLD AUTO: 47 % (ref 36.6–50.3)
HGB BLD-MCNC: 16.2 G/DL (ref 12.1–17)
HGB UR QL STRIP: NEGATIVE
HYALINE CASTS URNS QL MICRO: ABNORMAL /LPF (ref 0–2)
IMM GRANULOCYTES # BLD AUTO: 0.1 K/UL (ref 0–0.04)
IMM GRANULOCYTES NFR BLD AUTO: 0 % (ref 0–0.5)
KETONES UR QL STRIP.AUTO: ABNORMAL MG/DL
LEUKOCYTE ESTERASE UR QL STRIP.AUTO: NEGATIVE
LIPASE SERPL-CCNC: 14 U/L (ref 13–75)
LYMPHOCYTES # BLD: 3.1 K/UL (ref 0.8–3.5)
LYMPHOCYTES NFR BLD: 22 % (ref 12–49)
MAGNESIUM SERPL-MCNC: 1.6 MG/DL (ref 1.6–2.4)
MCH RBC QN AUTO: 28.8 PG (ref 26–34)
MCHC RBC AUTO-ENTMCNC: 34.5 G/DL (ref 30–36.5)
MCV RBC AUTO: 83.6 FL (ref 80–99)
MONOCYTES # BLD: 1 K/UL (ref 0–1)
MONOCYTES NFR BLD: 7 % (ref 5–13)
NEUTS SEG # BLD: 9.4 K/UL (ref 1.8–8)
NEUTS SEG NFR BLD: 68 % (ref 32–75)
NITRITE UR QL STRIP.AUTO: NEGATIVE
NRBC # BLD: 0 K/UL (ref 0–0.01)
NRBC BLD-RTO: 0 PER 100 WBC
PH UR STRIP: 6 (ref 5–8)
PLATELET # BLD AUTO: 244 K/UL (ref 150–400)
PMV BLD AUTO: 9.1 FL (ref 8.9–12.9)
POTASSIUM SERPL-SCNC: 4.2 MMOL/L (ref 3.5–5.1)
PROT SERPL-MCNC: 8 G/DL (ref 6.4–8.2)
PROT UR STRIP-MCNC: NEGATIVE MG/DL
RBC # BLD AUTO: 5.62 M/UL (ref 4.1–5.7)
RBC #/AREA URNS HPF: ABNORMAL /HPF (ref 0–5)
SODIUM SERPL-SCNC: 133 MMOL/L (ref 136–145)
SP GR UR REFRACTOMETRY: 1.01 (ref 1–1.03)
URINE CULTURE IF INDICATED: ABNORMAL
UROBILINOGEN UR QL STRIP.AUTO: 1 EU/DL (ref 0.2–1)
WBC # BLD AUTO: 13.7 K/UL (ref 4.1–11.1)
WBC URNS QL MICRO: ABNORMAL /HPF (ref 0–4)

## 2024-05-13 PROCEDURE — 83735 ASSAY OF MAGNESIUM: CPT

## 2024-05-13 PROCEDURE — 80053 COMPREHEN METABOLIC PANEL: CPT

## 2024-05-13 PROCEDURE — 6360000002 HC RX W HCPCS

## 2024-05-13 PROCEDURE — 36415 COLL VENOUS BLD VENIPUNCTURE: CPT

## 2024-05-13 PROCEDURE — 85025 COMPLETE CBC W/AUTO DIFF WBC: CPT

## 2024-05-13 PROCEDURE — 74174 CTA ABD&PLVS W/CONTRAST: CPT

## 2024-05-13 PROCEDURE — 2580000003 HC RX 258

## 2024-05-13 PROCEDURE — 83690 ASSAY OF LIPASE: CPT

## 2024-05-13 PROCEDURE — 81001 URINALYSIS AUTO W/SCOPE: CPT

## 2024-05-13 PROCEDURE — 94761 N-INVAS EAR/PLS OXIMETRY MLT: CPT

## 2024-05-13 PROCEDURE — 6360000004 HC RX CONTRAST MEDICATION

## 2024-05-13 PROCEDURE — 99285 EMERGENCY DEPT VISIT HI MDM: CPT

## 2024-05-13 PROCEDURE — 96374 THER/PROPH/DIAG INJ IV PUSH: CPT

## 2024-05-13 RX ORDER — CIPROFLOXACIN 500 MG/1
500 TABLET, FILM COATED ORAL 2 TIMES DAILY
Qty: 20 TABLET | Refills: 0 | Status: SHIPPED | OUTPATIENT
Start: 2024-05-13 | End: 2024-05-23

## 2024-05-13 RX ORDER — 0.9 % SODIUM CHLORIDE 0.9 %
1000 INTRAVENOUS SOLUTION INTRAVENOUS ONCE
Status: COMPLETED | OUTPATIENT
Start: 2024-05-13 | End: 2024-05-13

## 2024-05-13 RX ORDER — KETOROLAC TROMETHAMINE 30 MG/ML
30 INJECTION, SOLUTION INTRAMUSCULAR; INTRAVENOUS
Status: COMPLETED | OUTPATIENT
Start: 2024-05-13 | End: 2024-05-13

## 2024-05-13 RX ORDER — ONDANSETRON 8 MG/1
4 TABLET, ORALLY DISINTEGRATING ORAL EVERY 8 HOURS PRN
Qty: 8 TABLET | Refills: 0 | Status: SHIPPED | OUTPATIENT
Start: 2024-05-13

## 2024-05-13 RX ORDER — HYDROCODONE BITARTRATE AND ACETAMINOPHEN 5; 300 MG/1; MG/1
1 TABLET ORAL
Qty: 9 TABLET | Refills: 0 | Status: SHIPPED | OUTPATIENT
Start: 2024-05-13 | End: 2024-05-16

## 2024-05-13 RX ORDER — METRONIDAZOLE 500 MG/1
500 TABLET ORAL 3 TIMES DAILY
Qty: 30 TABLET | Refills: 0 | Status: SHIPPED | OUTPATIENT
Start: 2024-05-13 | End: 2024-05-23

## 2024-05-13 RX ADMIN — IOPAMIDOL 100 ML: 755 INJECTION, SOLUTION INTRAVENOUS at 13:17

## 2024-05-13 RX ADMIN — KETOROLAC TROMETHAMINE 30 MG: 30 INJECTION, SOLUTION INTRAMUSCULAR at 13:57

## 2024-05-13 RX ADMIN — SODIUM CHLORIDE 1000 ML: 9 INJECTION, SOLUTION INTRAVENOUS at 11:46

## 2024-05-13 ASSESSMENT — PAIN - FUNCTIONAL ASSESSMENT: PAIN_FUNCTIONAL_ASSESSMENT: NONE - DENIES PAIN

## 2024-05-13 ASSESSMENT — PAIN SCALES - GENERAL: PAINLEVEL_OUTOF10: 4

## 2024-05-13 ASSESSMENT — PAIN DESCRIPTION - LOCATION: LOCATION: ABDOMEN;BACK

## 2024-05-13 NOTE — ED TRIAGE NOTES
Pt complains of left lower abdominal pain for a week. Pain gets worse when he has to have a BP. Pt vomited last night. Diarrhea for a week. Pt urinating okay but very dark per pt.

## 2024-05-13 NOTE — DISCHARGE INSTRUCTIONS
Hyaline Casts, UA 0-2 0 - 2 /lpf       Radiologic Studies  CTA ABDOMEN PELVIS W WO CONTRAST   Final Result   Acute sigmoid diverticulitis. No active GI bleeding.

## 2024-05-13 NOTE — ED PROVIDER NOTES
and agreed upon. The patient is to follow up as recommended or return to ER should their symptoms worsen.     PATIENT REFERRED TO:  [facility] EMERGENCY DEPT  [address] [facility] [address] [phone]    If symptoms worsen    Laina Snow MD  [address] [facility] [address] [phone]    Call in 1 day      [facility] [address] OFFICE  [address] [facility] [address]  [facility] [address] [phone]  Call in 1 day        DISCHARGE MEDICATIONS:  New Prescriptions    CIPROFLOXACIN (CIPRO) 500 MG TABLET    Take 1 tablet by mouth 2 times daily for 10 days    HYDROCODONE-ACETAMINOPHEN 5-300 MG TABS    Take 1 tablet by mouth every 8-12 hours as needed for Pain for up to 3 days. Max Daily Amount: 3 tablets    METRONIDAZOLE (FLAGYL) 500 MG TABLET    Take 1 tablet by mouth 3 times daily for 10 days    ONDANSETRON (ZOFRAN-ODT) 8 MG TBDP DISINTEGRATING TABLET    Place 0.5 tablets under the tongue every 8 hours as needed for Nausea or Vomiting         (Please note that parts of this dictation were completed with voice recognition software. Quite often unanticipated grammatical, syntax, homophones, and other interpretive errors are inadvertently transcribed by the computer software. Efforts were made to edit the dictation but occasionally words remain mis-transcribed.)    MITCHELL Butt NP (electronically signed)  Emergency Attending Physician / Physician Assistant / Nurse Practitioner     Madeleine Braun APRN - NP  05/13/24 6400

## 2024-05-14 NOTE — TELEPHONE ENCOUNTER
Called and LVM for pt that he is due for a f/u apt and that a 30-day supply was sent in What symptoms are present that would indicate a need for this test?

## 2024-05-17 DIAGNOSIS — F41.9 ANXIETY AND DEPRESSION: ICD-10-CM

## 2024-05-17 DIAGNOSIS — F32.A ANXIETY AND DEPRESSION: ICD-10-CM

## 2024-05-17 DIAGNOSIS — I10 ESSENTIAL (PRIMARY) HYPERTENSION: ICD-10-CM

## 2024-05-17 RX ORDER — HYDROXYZINE 50 MG/1
TABLET, FILM COATED ORAL
Qty: 90 TABLET | Refills: 2 | Status: SHIPPED | OUTPATIENT
Start: 2024-05-17

## 2024-05-17 RX ORDER — BUSPIRONE HYDROCHLORIDE 15 MG/1
15 TABLET ORAL 2 TIMES DAILY
Qty: 90 TABLET | Refills: 1 | Status: SHIPPED | OUTPATIENT
Start: 2024-05-17

## 2024-05-17 NOTE — TELEPHONE ENCOUNTER
[facility] sent a fax    Medication Refill Request    Jhonny LEÓN Cook Jr. is requesting a refill of the following medication(s):   Requested Prescriptions     Pending Prescriptions Disp Refills    hydrOXYzine HCl (ATARAX) 50 MG tablet 90 tablet 2     Sig: TAKE 2 TABLETS BY MOUTH EVERY 6 HOURS AS NEEDED FOR ANXIETY    busPIRone (BUSPAR) 15 MG tablet 90 tablet 1     Sig: Take 15 mg by mouth 2 times daily        Listed PCP is Laina Snow MD   Last provider to prescribe medication: DR. Naranjo  HydrOXYzine:  Last Date of Medication Prescribed: 01/06/2024     BusPIRone:02/14/2024  Last Date of Medication Prescribed:    Date of Last Office Visit at [facility]: 08/28/2023     FUTURE APPOINTMENT: No future appointments.    Please send refill to:    [facility] [address] [place] [address] [id number] [place] [address] [phone] - F [phone]  [address] [place] [address]  Phone: [phone] Fax: [phone]      Please review request and approve or deny with recommendations.      no

## 2024-05-17 NOTE — TELEPHONE ENCOUNTER
[facility] sent a fax    Medication Refill Request    Jhonny Cook Jr. is requesting a refill of the following medication(s):   Requested Prescriptions     Pending Prescriptions Disp Refills    hydrOXYzine HCl (ATARAX) 50 MG tablet 90 tablet 2     Sig: TAKE 2 TABLETS BY MOUTH EVERY 6 HOURS AS NEEDED FOR ANXIETY        Listed PCP is Gracious Laina Naranjo MD   Last provider to prescribe medication: Dr. Naranjo  Last Date of Medication Prescribed: 01/06/2024   Date of Last Office Visit at [facility]: 08/28/2023     FUTURE APPOINTMENT: No future appointments.    Please send refill to:    [facility] [address] [place] [address] [facility] [phone] - F [phone]  [address] [place] [address]  Phone: [phone] Fax: [phone]      Please review request and approve or deny with recommendations.

## 2024-05-30 RX ORDER — OMEPRAZOLE 40 MG/1
40 CAPSULE, DELAYED RELEASE ORAL DAILY
Qty: 30 CAPSULE | Refills: 0 | Status: SHIPPED | OUTPATIENT
Start: 2024-05-30 | End: 2024-06-29

## 2024-05-30 NOTE — TELEPHONE ENCOUNTER
[facility] faxed a refill request for the following:    Medication Refill Request    Jhonny TRAORE Joey Paige is requesting a refill of the following medication(s):   Requested Prescriptions     Pending Prescriptions Disp Refills    omeprazole (PRILOSEC) 40 MG delayed release capsule 90 capsule 0     Sig: Take 1 capsule by mouth daily        Listed PCP is Laina Snow MD   Last provider to prescribe medication: Dr. Naranjo  Last Date of Medication Prescribed: 09/14/2023   Date of Last Office Visit at [facility]: 08/28/2023     FUTURE APPOINTMENT: No future appointments.    Please send refill to:    [facility] [address] [place] [address] [id number] [place] [address] [phone] - F [phone]  [address] [place] [address]  Phone: [phone] Fax: [phone]      Please review request and approve or deny with recommendations.

## 2024-06-18 ENCOUNTER — TELEPHONE (OUTPATIENT)
Age: 54
End: 2024-06-18

## 2024-06-18 NOTE — TELEPHONE ENCOUNTER
Attempted to reach patient to schedule a diabetes follow up appointment. Left voicemail for patient to call back to schedule.

## 2024-06-18 NOTE — TELEPHONE ENCOUNTER
----- Message from Rosie Li MA sent at 5/16/2024 12:00 PM EDT -----  Subject: Appointment Request    Reason for Call: New Patient/New to Provider Appointment needed: Routine   ED Follow Up Visit    QUESTIONS    Reason for appointment request? No appointments available during search     Additional Information for Provider? Pt request to schedule a follow up   from the ER 5/13 for diverticulitis. Dealing with constipation, out of   pain med.  ---------------------------------------------------------------------------  --------------  CALL BACK INFO  [phone]; OK to leave message on voicemail  ---------------------------------------------------------------------------  --------------  SCRIPT ANSWERS

## 2024-08-13 DIAGNOSIS — E78.2 MIXED HYPERLIPIDEMIA: ICD-10-CM

## 2024-08-13 DIAGNOSIS — I10 ESSENTIAL (PRIMARY) HYPERTENSION: ICD-10-CM

## 2024-08-14 RX ORDER — ATORVASTATIN CALCIUM 80 MG/1
80 TABLET, FILM COATED ORAL DAILY
Qty: 30 TABLET | Refills: 1 | Status: SHIPPED | OUTPATIENT
Start: 2024-08-14

## 2024-08-14 RX ORDER — BISOPROLOL FUMARATE AND HYDROCHLOROTHIAZIDE 5; 6.25 MG/1; MG/1
1 TABLET ORAL DAILY
Qty: 30 TABLET | Refills: 1 | Status: SHIPPED | OUTPATIENT
Start: 2024-08-14

## 2024-08-14 NOTE — TELEPHONE ENCOUNTER
Will send 30 day supply of lipitor and bisoprolol-hctz with 1 refill. Pt needs to be seen in clinic, has not been seen in one year. Will contact patient to inform and schedule visit.    Laina Naranjo MD  08/14/24

## 2024-08-20 DIAGNOSIS — F41.9 ANXIETY AND DEPRESSION: ICD-10-CM

## 2024-08-20 DIAGNOSIS — F32.A ANXIETY AND DEPRESSION: ICD-10-CM

## 2024-08-20 NOTE — TELEPHONE ENCOUNTER
Medication Refill Request    Jhonny Cook  is requesting a refill of the following medication(s):   Requested Prescriptions      No prescriptions requested or ordered in this encounter        Listed PCP is Gracious Laina Naranjo MD   Last provider to prescribe medication: Dr. Naranjo  Last Date of Medication Prescribed: 05/17/24   Date of Last Office Visit at [facility]: 08/28/23   FUTURE APPOINTMENT: No future appointments.    Please send refill to:    [facility] [address] [place] [address] [id number] [place] [address] [phone] - F [phone]  [address] [place] [address]  Phone: [phone] Fax: [phone]      Please review request and approve or deny with recommendations.

## 2024-08-23 DIAGNOSIS — F32.A ANXIETY AND DEPRESSION: ICD-10-CM

## 2024-08-23 DIAGNOSIS — E11.65 TYPE 2 DIABETES MELLITUS WITH HYPERGLYCEMIA, WITHOUT LONG-TERM CURRENT USE OF INSULIN (HCC): ICD-10-CM

## 2024-08-23 DIAGNOSIS — F41.9 ANXIETY AND DEPRESSION: ICD-10-CM

## 2024-08-23 RX ORDER — OMEPRAZOLE 40 MG/1
40 CAPSULE, DELAYED RELEASE ORAL DAILY
Qty: 30 CAPSULE | Refills: 0 | OUTPATIENT
Start: 2024-08-23 | End: 2024-09-22

## 2024-08-23 RX ORDER — BUPROPION HYDROCHLORIDE 150 MG/1
150 TABLET, EXTENDED RELEASE ORAL DAILY
Qty: 90 TABLET | Refills: 1 | OUTPATIENT
Start: 2024-08-23

## 2024-08-23 RX ORDER — HUMAN INSULIN 100 [IU]/ML
10 INJECTION, SUSPENSION SUBCUTANEOUS NIGHTLY
Qty: 1 ADJUSTABLE DOSE PRE-FILLED PEN SYRINGE | Refills: 3 | OUTPATIENT
Start: 2024-08-23

## 2024-08-23 NOTE — TELEPHONE ENCOUNTER
Medication Refill Request    Jhonny Cook  is requesting a refill of the following medication(s):   Requested Prescriptions      No prescriptions requested or ordered in this encounter        Listed PCP is Gracious Laina Naranjo MD   Last provider to prescribe medication: Dr. Naranjo  Last Date of Medication Prescribed: 05/30/24   Date of Last Office Visit at [facility]: 08/28/23   FUTURE APPOINTMENT: No future appointments.    Please send refill to:    [facility] [address] [place] [address] [id number] [place] [address] [phone] - F [phone]  [address] [place] [address]  Phone: [phone] Fax: [phone]      Please review request and approve or deny with recommendations.

## 2024-08-23 NOTE — TELEPHONE ENCOUNTER
Patient called stating that he has gotten covid, because of his sister. I offered to schedule a virtual appointment several times throughout the phone call, but patient declined wanting to be scheduled stating that he wanted to speak with his doctor and get advice since since he had chronic conditions. Also, while on the phone patient stated that he was in need of refills. Patient stated that he needed Novolin Flexpen, Omeprazole 40 mg, and Bupropion 150 mg. Would like for prescriptions to be sent to [facility].    Thanks!

## 2024-08-26 DIAGNOSIS — F32.A ANXIETY AND DEPRESSION: ICD-10-CM

## 2024-08-26 DIAGNOSIS — F41.9 ANXIETY AND DEPRESSION: ICD-10-CM

## 2024-08-26 RX ORDER — BUSPIRONE HYDROCHLORIDE 15 MG/1
15 TABLET ORAL 2 TIMES DAILY
Qty: 90 TABLET | Refills: 1 | Status: SHIPPED | OUTPATIENT
Start: 2024-08-26

## 2024-08-26 NOTE — TELEPHONE ENCOUNTER
Medication Refill Request    Jhonny Cook  is requesting a refill of the following medication(s):   Requested Prescriptions      No prescriptions requested or ordered in this encounter        Listed PCP is Gracious Laina Naranjo MD   Last provider to prescribe medication: Dr. Naranjo  Last Date of Medication Prescribed: 05/17/24   Date of Last Office Visit at [facility]: 08/28/23   FUTURE APPOINTMENT: No future appointments.    Please send refill to:    [facility] [address] [place] [address] [id number] [place] [address] [phone] - F [phone]  [address] [place] [address]  Phone: [phone] Fax: [phone]      Please review request and approve or deny with recommendations.

## 2024-08-27 RX ORDER — BUSPIRONE HYDROCHLORIDE 15 MG/1
15 TABLET ORAL 2 TIMES DAILY
Qty: 90 TABLET | Refills: 1 | OUTPATIENT
Start: 2024-08-27

## 2024-08-27 RX ORDER — OMEPRAZOLE 40 MG/1
40 CAPSULE, DELAYED RELEASE ORAL DAILY
Qty: 30 CAPSULE | Refills: 0 | OUTPATIENT
Start: 2024-08-27 | End: 2024-09-26

## 2024-08-28 ENCOUNTER — TELEPHONE (OUTPATIENT)
Age: 54
End: 2024-08-28

## 2024-08-28 NOTE — TELEPHONE ENCOUNTER
----- Message from Dr. Laina Naranjo MD sent at 8/14/2024  5:18 PM EDT -----  Regarding: Follow up  Hey Bebe,     Please call this pt to schedule a visit. Hasn't been seen in a year and have called in the past regarding this. If it is a financial issue (I believe he was self pay in the past), please let him know that we have a  that can speak with him if needed for assistance with this.    Thanks!  Laina

## 2024-08-30 DIAGNOSIS — E11.9 TYPE 2 DIABETES MELLITUS WITHOUT COMPLICATION, WITHOUT LONG-TERM CURRENT USE OF INSULIN (HCC): ICD-10-CM

## 2024-08-30 NOTE — TELEPHONE ENCOUNTER
Medication Refill Request    Jhonny Cook  is requesting a refill of the following medication(s):   Requested Prescriptions      No prescriptions requested or ordered in this encounter        Listed PCP is Gracious Laina Naranjo MD   Last provider to prescribe medication: Dr. Naranjo  Last Date of Medication Prescribed: 08/22/23   Date of Last Office Visit at [facility]: 08/28/23   FUTURE APPOINTMENT: No future appointments.    Please send refill to:    [facility] [address] [place] [address] [id number] [place] [address] [phone] - F [phone]  [address] [place] [address]  Phone: [phone] Fax: [phone]      Please review request and approve or deny with recommendations.

## 2024-09-04 ENCOUNTER — TELEPHONE (OUTPATIENT)
Age: 54
End: 2024-09-04

## 2024-09-04 NOTE — TELEPHONE ENCOUNTER
SW attempted to reach patient regarding uninsured resource. No answer, left message.    JAVIER Hair   Navigator

## 2024-09-11 ENCOUNTER — TELEPHONE (OUTPATIENT)
Age: 54
End: 2024-09-11

## 2024-09-13 ENCOUNTER — APPOINTMENT (OUTPATIENT)
Facility: HOSPITAL | Age: 54
End: 2024-09-13

## 2024-09-13 ENCOUNTER — HOSPITAL ENCOUNTER (EMERGENCY)
Facility: HOSPITAL | Age: 54
Discharge: HOME | End: 2024-09-13
Attending: EMERGENCY MEDICINE

## 2024-09-13 VITALS
WEIGHT: 184.97 LBS | SYSTOLIC BLOOD PRESSURE: 123 MMHG | OXYGEN SATURATION: 96 % | DIASTOLIC BLOOD PRESSURE: 72 MMHG | HEIGHT: 70 IN | TEMPERATURE: 98.2 F | BODY MASS INDEX: 26.48 KG/M2 | HEART RATE: 70 BPM | RESPIRATION RATE: 18 BRPM

## 2024-09-13 DIAGNOSIS — R06.00 DYSPNEA, UNSPECIFIED TYPE: ICD-10-CM

## 2024-09-13 DIAGNOSIS — R07.89 ATYPICAL CHEST PAIN: Primary | ICD-10-CM

## 2024-09-13 LAB
ALBUMIN SERPL-MCNC: 3.8 G/DL (ref 3.5–5)
ALBUMIN/GLOB SERPL: 1.1 (ref 1.1–2.2)
ALP SERPL-CCNC: 88 U/L (ref 45–117)
ALT SERPL-CCNC: 52 U/L (ref 12–78)
ANION GAP SERPL CALC-SCNC: 7 MMOL/L (ref 2–12)
AST SERPL-CCNC: 30 U/L (ref 15–37)
BASOPHILS # BLD: 0.1 K/UL (ref 0–0.1)
BASOPHILS NFR BLD: 1 % (ref 0–1)
BILIRUB SERPL-MCNC: 1.3 MG/DL (ref 0.2–1)
BUN SERPL-MCNC: 8 MG/DL (ref 6–20)
BUN/CREAT SERPL: 9 (ref 12–20)
CALCIUM SERPL-MCNC: 9.5 MG/DL (ref 8.5–10.1)
CHLORIDE SERPL-SCNC: 100 MMOL/L (ref 97–108)
CO2 SERPL-SCNC: 26 MMOL/L (ref 21–32)
CREAT SERPL-MCNC: 0.91 MG/DL (ref 0.7–1.3)
D DIMER PPP FEU-MCNC: 0.19 MG/L FEU (ref 0–0.65)
DIFFERENTIAL METHOD BLD: ABNORMAL
EOSINOPHIL # BLD: 0.3 K/UL (ref 0–0.4)
EOSINOPHIL NFR BLD: 4 % (ref 0–7)
ERYTHROCYTE [DISTWIDTH] IN BLOOD BY AUTOMATED COUNT: 12.7 % (ref 11.5–14.5)
GLOBULIN SER CALC-MCNC: 3.5 G/DL (ref 2–4)
GLUCOSE SERPL-MCNC: 257 MG/DL (ref 65–100)
HCT VFR BLD AUTO: 41.9 % (ref 36.6–50.3)
HGB BLD-MCNC: 14.3 G/DL (ref 12.1–17)
IMM GRANULOCYTES # BLD AUTO: 0 K/UL (ref 0–0.04)
IMM GRANULOCYTES NFR BLD AUTO: 1 % (ref 0–0.5)
LYMPHOCYTES # BLD: 2 K/UL (ref 0.8–3.5)
LYMPHOCYTES NFR BLD: 28 % (ref 12–49)
MCH RBC QN AUTO: 28.9 PG (ref 26–34)
MCHC RBC AUTO-ENTMCNC: 34.1 G/DL (ref 30–36.5)
MCV RBC AUTO: 84.8 FL (ref 80–99)
MONOCYTES # BLD: 0.5 K/UL (ref 0–1)
MONOCYTES NFR BLD: 7 % (ref 5–13)
NEUTS SEG # BLD: 4.2 K/UL (ref 1.8–8)
NEUTS SEG NFR BLD: 59 % (ref 32–75)
NRBC # BLD: 0 K/UL (ref 0–0.01)
NRBC BLD-RTO: 0 PER 100 WBC
PLATELET # BLD AUTO: 166 K/UL (ref 150–400)
PMV BLD AUTO: 9.6 FL (ref 8.9–12.9)
POTASSIUM SERPL-SCNC: 4.6 MMOL/L (ref 3.5–5.1)
PROT SERPL-MCNC: 7.3 G/DL (ref 6.4–8.2)
RBC # BLD AUTO: 4.94 M/UL (ref 4.1–5.7)
SODIUM SERPL-SCNC: 133 MMOL/L (ref 136–145)
TROPONIN I SERPL HS-MCNC: <4 NG/L (ref 0–76)
WBC # BLD AUTO: 7.2 K/UL (ref 4.1–11.1)

## 2024-09-13 PROCEDURE — 80053 COMPREHEN METABOLIC PANEL: CPT

## 2024-09-13 PROCEDURE — 85379 FIBRIN DEGRADATION QUANT: CPT

## 2024-09-13 PROCEDURE — 36415 COLL VENOUS BLD VENIPUNCTURE: CPT

## 2024-09-13 PROCEDURE — 99285 EMERGENCY DEPT VISIT HI MDM: CPT

## 2024-09-13 PROCEDURE — 71046 X-RAY EXAM CHEST 2 VIEWS: CPT

## 2024-09-13 PROCEDURE — 84484 ASSAY OF TROPONIN QUANT: CPT

## 2024-09-13 PROCEDURE — 85025 COMPLETE CBC W/AUTO DIFF WBC: CPT

## 2024-09-13 RX ORDER — ONDANSETRON 2 MG/ML
4 INJECTION INTRAMUSCULAR; INTRAVENOUS ONCE
Status: DISCONTINUED | OUTPATIENT
Start: 2024-09-13 | End: 2024-09-13 | Stop reason: HOSPADM

## 2024-09-13 ASSESSMENT — PAIN - FUNCTIONAL ASSESSMENT: PAIN_FUNCTIONAL_ASSESSMENT: 0-10

## 2024-09-13 ASSESSMENT — HEART SCORE: ECG: NORMAL

## 2024-09-13 ASSESSMENT — PAIN SCALES - GENERAL: PAINLEVEL_OUTOF10: 0

## 2024-09-15 LAB
EKG DIAGNOSIS: NORMAL
EKG P-R INTERVAL: 156 MS
EKG Q-T INTERVAL: 418 MS
EKG QRS DURATION: 86 MS
EKG QTC CALCULATION (BAZETT): 444 MS
EKG R AXIS: 26 DEGREES
EKG T AXIS: 4 DEGREES
EKG VENTRICULAR RATE: 68 BPM

## 2024-09-19 DIAGNOSIS — E03.9 HYPOTHYROIDISM, UNSPECIFIED TYPE: ICD-10-CM

## 2024-09-20 RX ORDER — LEVOTHYROXINE SODIUM 150 UG/1
TABLET ORAL
Qty: 60 TABLET | Refills: 0 | Status: SHIPPED | OUTPATIENT
Start: 2024-09-20

## 2024-09-24 ENCOUNTER — TELEPHONE (OUTPATIENT)
Age: 54
End: 2024-09-24

## 2024-09-24 DIAGNOSIS — E03.9 HYPOTHYROIDISM, UNSPECIFIED TYPE: ICD-10-CM

## 2024-09-30 DIAGNOSIS — I10 ESSENTIAL (PRIMARY) HYPERTENSION: ICD-10-CM

## 2024-09-30 NOTE — TELEPHONE ENCOUNTER
Medication Refill Request    Jhonny Cook  is requesting a refill of the following medication(s):   Requested Prescriptions      No prescriptions requested or ordered in this encounter        Listed PCP is Gracious Laina Naranjo MD   Last provider to prescribe medication: Dr. Naranjo  Last Date of Medication Prescribed: 05/17/24   Date of Last Office Visit at [facility]: 08/28/23   FUTURE APPOINTMENT: No future appointments.    Please send refill to:    [facility] [address] [place] [address] [id number] [place] [address] [phone] - F [phone]  [address] [place] [address]  Phone: [phone] Fax: [phone]      Please review request and approve or deny with recommendations.

## 2024-10-01 RX ORDER — HYDROXYZINE HYDROCHLORIDE 50 MG/1
TABLET, FILM COATED ORAL
Qty: 90 TABLET | Refills: 0 | Status: SHIPPED | OUTPATIENT
Start: 2024-10-01

## 2024-10-04 ENCOUNTER — TELEPHONE (OUTPATIENT)
Age: 54
End: 2024-10-04

## 2024-10-14 ENCOUNTER — TELEPHONE (OUTPATIENT)
Age: 54
End: 2024-10-14

## 2024-10-14 DIAGNOSIS — E78.2 MIXED HYPERLIPIDEMIA: ICD-10-CM

## 2024-10-14 DIAGNOSIS — I10 ESSENTIAL (PRIMARY) HYPERTENSION: ICD-10-CM

## 2024-10-14 NOTE — TELEPHONE ENCOUNTER
Pt has scheduled an appt with you for 11/01; however, he is out  of medication. He is requesting short refills of bisoprolol-hydroCHLOROthiazide (ZIAC) 5-6.25 MG per tablet and atorvastatin (LIPITOR) 80 MG tablet to last until his appt.    If appropriate, please send to [facility] [address] [place] [address] [facility] [phone] - F [phone]     Thank you

## 2024-10-15 RX ORDER — ATORVASTATIN CALCIUM 80 MG/1
80 TABLET, FILM COATED ORAL DAILY
Qty: 30 TABLET | Refills: 1 | Status: SHIPPED | OUTPATIENT
Start: 2024-10-15

## 2024-10-15 RX ORDER — BISOPROLOL FUMARATE AND HYDROCHLOROTHIAZIDE 5; 6.25 MG/1; MG/1
1 TABLET ORAL DAILY
Qty: 30 TABLET | Refills: 1 | Status: SHIPPED | OUTPATIENT
Start: 2024-10-15

## 2024-10-16 ENCOUNTER — TELEPHONE (OUTPATIENT)
Age: 54
End: 2024-10-16

## 2024-10-16 NOTE — TELEPHONE ENCOUNTER
Dr. Naranjo has sent in the pt's bisoprolol-hydroCHLOROthiazide (ZIAC) 5-6.25 MG per tablet and atorvastatin (LIPITOR) 80 MG tablet to his [facility] pharmacy.

## 2024-10-16 NOTE — TELEPHONE ENCOUNTER
----- Message from Dr. Laina Naranjo MD sent at 10/15/2024  1:00 PM EDT -----  Regarding: Social work appointment  Hi,   Could you please schedule an appointment with Gaby (regarding finances / insurance) for this patient on the day of their next appointment (11/01/24).  Thanks!

## 2024-10-23 DIAGNOSIS — E03.9 HYPOTHYROIDISM, UNSPECIFIED TYPE: ICD-10-CM

## 2024-10-23 RX ORDER — LEVOTHYROXINE SODIUM 150 UG/1
TABLET ORAL
Qty: 60 TABLET | Refills: 0 | OUTPATIENT
Start: 2024-10-23

## 2024-10-29 DIAGNOSIS — E03.9 HYPOTHYROIDISM, UNSPECIFIED TYPE: ICD-10-CM

## 2024-10-29 NOTE — TELEPHONE ENCOUNTER
Medication Refill Request    Jhonny Cook  is requesting a refill of the following medication(s):   Requested Prescriptions     Pending Prescriptions Disp Refills    levothyroxine (SYNTHROID) 150 MCG tablet 60 tablet 0     Sig: TAKE 2 TABLETS BY MOUTH ONCE DAILY BEFORE BREAKFAST        Listed PCP is Laina Snow MD   Last provider to prescribe medication: Dr. Naranjo  Last Date of Medication Prescribed: 09/20/24   Date of Last Office Visit at [facility]: 08/28/2023   FUTURE APPOINTMENT:   Future Appointments   Date Time Provider Department Center   11/1/2024  2:20 PM Laina Snow MD [facility]       Please send refill to:    [facility] [address] [place] [address] [id number] [place] [address] [phone] - F [phone]  [address] [place] [address]  Phone: [phone] Fax: [phone]      Please review request and approve or deny with recommendations.

## 2024-11-01 ENCOUNTER — INITIAL CONSULT (OUTPATIENT)
Age: 54
End: 2024-11-01

## 2024-11-01 ENCOUNTER — OFFICE VISIT (OUTPATIENT)
Age: 54
End: 2024-11-01
Payer: MEDICAID

## 2024-11-01 VITALS
DIASTOLIC BLOOD PRESSURE: 68 MMHG | OXYGEN SATURATION: 97 % | HEART RATE: 65 BPM | WEIGHT: 183.2 LBS | BODY MASS INDEX: 26.23 KG/M2 | HEIGHT: 70 IN | SYSTOLIC BLOOD PRESSURE: 106 MMHG | RESPIRATION RATE: 20 BRPM | TEMPERATURE: 97.9 F

## 2024-11-01 DIAGNOSIS — F41.9 ANXIETY AND DEPRESSION: ICD-10-CM

## 2024-11-01 DIAGNOSIS — K21.9 GASTROESOPHAGEAL REFLUX DISEASE, UNSPECIFIED WHETHER ESOPHAGITIS PRESENT: ICD-10-CM

## 2024-11-01 DIAGNOSIS — F32.A ANXIETY AND DEPRESSION: ICD-10-CM

## 2024-11-01 DIAGNOSIS — Z23 INFLUENZA VACCINE NEEDED: ICD-10-CM

## 2024-11-01 DIAGNOSIS — Z59.41 FOOD INSECURITY: ICD-10-CM

## 2024-11-01 DIAGNOSIS — Z00.00 ENCOUNTER FOR WELL ADULT EXAM WITHOUT ABNORMAL FINDINGS: Primary | ICD-10-CM

## 2024-11-01 DIAGNOSIS — Z59.86 FINANCIAL INSECURITY: ICD-10-CM

## 2024-11-01 DIAGNOSIS — Z12.11 COLON CANCER SCREENING: ICD-10-CM

## 2024-11-01 DIAGNOSIS — Z59.9 FINANCIAL DIFFICULTY: ICD-10-CM

## 2024-11-01 DIAGNOSIS — Z59.71 UNINSURED: Primary | ICD-10-CM

## 2024-11-01 DIAGNOSIS — Z72.0 CURRENT TOBACCO USE: ICD-10-CM

## 2024-11-01 DIAGNOSIS — E11.65 TYPE 2 DIABETES MELLITUS WITH HYPERGLYCEMIA, WITHOUT LONG-TERM CURRENT USE OF INSULIN (HCC): ICD-10-CM

## 2024-11-01 DIAGNOSIS — E03.9 HYPOTHYROIDISM, UNSPECIFIED TYPE: ICD-10-CM

## 2024-11-01 DIAGNOSIS — I10 ESSENTIAL (PRIMARY) HYPERTENSION: ICD-10-CM

## 2024-11-01 DIAGNOSIS — E78.2 MIXED HYPERLIPIDEMIA: ICD-10-CM

## 2024-11-01 PROCEDURE — 99396 PREV VISIT EST AGE 40-64: CPT

## 2024-11-01 RX ORDER — BLOOD SUGAR DIAGNOSTIC
1 STRIP MISCELLANEOUS DAILY
Qty: 100 EACH | Refills: 0 | Status: SHIPPED | OUTPATIENT
Start: 2024-11-01

## 2024-11-01 RX ORDER — BUPROPION HYDROCHLORIDE 150 MG/1
150 TABLET, EXTENDED RELEASE ORAL DAILY
Qty: 90 TABLET | Refills: 1 | Status: SHIPPED | OUTPATIENT
Start: 2024-11-01

## 2024-11-01 RX ORDER — OMEPRAZOLE 40 MG/1
40 CAPSULE, DELAYED RELEASE ORAL DAILY
Qty: 30 CAPSULE | Refills: 1 | Status: SHIPPED | OUTPATIENT
Start: 2024-11-01 | End: 2024-12-31

## 2024-11-01 RX ORDER — BUSPIRONE HYDROCHLORIDE 15 MG/1
15 TABLET ORAL 2 TIMES DAILY
Qty: 90 TABLET | Refills: 1 | Status: SHIPPED | OUTPATIENT
Start: 2024-11-01

## 2024-11-01 RX ORDER — HUMAN INSULIN 100 [IU]/ML
10 INJECTION, SUSPENSION SUBCUTANEOUS NIGHTLY
Qty: 1 ADJUSTABLE DOSE PRE-FILLED PEN SYRINGE | Refills: 3 | Status: SHIPPED | OUTPATIENT
Start: 2024-11-01

## 2024-11-01 RX ORDER — ASPIRIN 325 MG
325 TABLET ORAL PRN
COMMUNITY

## 2024-11-01 SDOH — ECONOMIC STABILITY - FOOD INSECURITY: FOOD INSECURITY: Z59.41

## 2024-11-01 SDOH — ECONOMIC STABILITY - INCOME SECURITY: FINANCIAL INSECURITY: Z59.86

## 2024-11-01 SDOH — ECONOMIC STABILITY - INCOME SECURITY: PROBLEM RELATED TO HOUSING AND ECONOMIC CIRCUMSTANCES, UNSPECIFIED: Z59.9

## 2024-11-01 ASSESSMENT — ANXIETY QUESTIONNAIRES
IF YOU CHECKED OFF ANY PROBLEMS ON THIS QUESTIONNAIRE, HOW DIFFICULT HAVE THESE PROBLEMS MADE IT FOR YOU TO DO YOUR WORK, TAKE CARE OF THINGS AT HOME, OR GET ALONG WITH OTHER PEOPLE: SOMEWHAT DIFFICULT
6. BECOMING EASILY ANNOYED OR IRRITABLE: NEARLY EVERY DAY
3. WORRYING TOO MUCH ABOUT DIFFERENT THINGS: SEVERAL DAYS
2. NOT BEING ABLE TO STOP OR CONTROL WORRYING: SEVERAL DAYS
5. BEING SO RESTLESS THAT IT IS HARD TO SIT STILL: SEVERAL DAYS
1. FEELING NERVOUS, ANXIOUS, OR ON EDGE: SEVERAL DAYS
7. FEELING AFRAID AS IF SOMETHING AWFUL MIGHT HAPPEN: MORE THAN HALF THE DAYS
4. TROUBLE RELAXING: SEVERAL DAYS
GAD7 TOTAL SCORE: 10

## 2024-11-01 ASSESSMENT — PATIENT HEALTH QUESTIONNAIRE - PHQ9
6. FEELING BAD ABOUT YOURSELF - OR THAT YOU ARE A FAILURE OR HAVE LET YOURSELF OR YOUR FAMILY DOWN: SEVERAL DAYS
SUM OF ALL RESPONSES TO PHQ9 QUESTIONS 1 & 2: 3
1. LITTLE INTEREST OR PLEASURE IN DOING THINGS: MORE THAN HALF THE DAYS
8. MOVING OR SPEAKING SO SLOWLY THAT OTHER PEOPLE COULD HAVE NOTICED. OR THE OPPOSITE, BEING SO FIGETY OR RESTLESS THAT YOU HAVE BEEN MOVING AROUND A LOT MORE THAN USUAL: SEVERAL DAYS
SUM OF ALL RESPONSES TO PHQ QUESTIONS 1-9: 8
9. THOUGHTS THAT YOU WOULD BE BETTER OFF DEAD, OR OF HURTING YOURSELF: NOT AT ALL
5. POOR APPETITE OR OVEREATING: NOT AT ALL
7. TROUBLE CONCENTRATING ON THINGS, SUCH AS READING THE NEWSPAPER OR WATCHING TELEVISION: SEVERAL DAYS
SUM OF ALL RESPONSES TO PHQ QUESTIONS 1-9: 8
4. FEELING TIRED OR HAVING LITTLE ENERGY: SEVERAL DAYS
2. FEELING DOWN, DEPRESSED OR HOPELESS: SEVERAL DAYS
SUM OF ALL RESPONSES TO PHQ QUESTIONS 1-9: 8
3. TROUBLE FALLING OR STAYING ASLEEP: SEVERAL DAYS
SUM OF ALL RESPONSES TO PHQ QUESTIONS 1-9: 8
10. IF YOU CHECKED OFF ANY PROBLEMS, HOW DIFFICULT HAVE THESE PROBLEMS MADE IT FOR YOU TO DO YOUR WORK, TAKE CARE OF THINGS AT HOME, OR GET ALONG WITH OTHER PEOPLE: SOMEWHAT DIFFICULT

## 2024-11-01 NOTE — PROGRESS NOTES
Medicaid and SNAP enrollment visit with JULIOCESAR Khanator via telephone. JULIOCESAR assisted patient with Medicaid enrollment process via [employer]. Application completed today, esther #[id number]. 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 ID and information uploaded to application. JULIOCESAR Khanator listed as authorized rep per patient's request/consent.      Patient advised she should hear back from [employer] within 45 days. Advised to respond to any request for additional documentation promptly and by due date to avoid denial of application.      Plan:  Ongoing psychosocial support and resource referral, as desired by the patient and family.       JAVIER Hair

## 2024-11-01 NOTE — PATIENT INSTRUCTIONS
they offer:  Mammograms and PAP smears regardless of ability to pay.  Contact: [phone]        [facility] Health Financial Assistance  What they offer: [facility] provides financial assistance to patients based on their income, assets, and needs. Assistance may also be provided in obtaining free or low-cost health insurance or arranging a manageable payment plan.  Website: https://www.[facility]/locations/[facility]-medical-center/billing-and-insurance/financial-assistance  Assistance application can be downloaded from above website  Financial Counseling Call Center: [phone]          Medications    Good Rx  What they offer: Good Rx tracks prescription drug prices and provides free drug coupons for discounts on medications.  Website: https://www.Xiaoi Robert/  NeedyMeds  What they offer: [employer] offers free information on medications and healthcare cost savings programs including prescription assistance programs, coupons, and discount programs.  Website: https://www.FlowBelow Aero.iSuppli/  Helpline: 654.619.8021    RX Assist  What they offer: Information about free and low-cost medicine programs.  Website: https://www.rxassist.iSuppli/    [facility] $4 Prescription Program  What they offer: Prescription Program includes up to a 30-day supply for $4 and a 90-day supply for $10 of some covered generic drugs at commonly prescribed dosages  Website: https://www.Postify/cp/4-prescriptions/1705251Hfrqjuxau  CommonHelp  What they offer: Partnership with the [place] Department of . Assist with finding and applying for government funded programs and benefits. You can also update your benefits or report changes through [employer].  Website: https://www.[employer].[place].[employer]/  Phone Number: 833-5callva ([phone])    [employer] [place] [employer] EnergyShare  What they offer: EnergyShare is [employer]’s energy assistance program of last resort for anyone who faces financial hardships from unemployment or family crisis.  Phone Number:

## 2024-11-01 NOTE — PROGRESS NOTES
I saw and evaluated the patient, performing the key elements of the service.  I discussed the findings, assessment and plan with the resident and agree with the resident's findings and plan as documented in the resident's note.  
Identified pt with two pt identifiers(name and ). Reviewed record in preparation for visit and have obtained necessary documentation.  Chief Complaint   Patient presents with    Annual Exam   Fasting. No concerns today.    Health Maintenance Due   Topic    Pneumococcal 0-64 years Vaccine (1 of 2 - PCV)    HIV screen     Diabetic retinal exam     Hepatitis C screen     Hepatitis B vaccine (1 of 3 - 19+ 3-dose series)    Colorectal Cancer Screen     Shingles vaccine (1 of 2)    Diabetic foot exam     DTaP/Tdap/Td vaccine (2 - Td or Tdap)    Flu vaccine (1)    A1C test (Diabetic or Prediabetic)     Diabetic Alb to Cr ratio (uACR) test     Lipids     Depression Monitoring     COVID-19 Vaccine ( season)       Vitals:    24 1426   BP: 106/68   Site: Left Upper Arm   Position: Sitting   Cuff Size: Medium Adult   Pulse: 65   Resp: 20   Temp: 97.9 °F (36.6 °C)   TempSrc: Oral   SpO2: 97%   Weight: 83.1 kg (183 lb 3.2 oz)   Height: 1.778 m (5' 10\")         \"Have you been to the ER, urgent care clinic since your last visit?  Hospitalized since your last visit?\"    Yes, [facility] ED, Diverticulitis and chest pain     “Have you seen or consulted any other health care providers outside of [facility] since your last visit?”    NO        “Have you had a colorectal cancer screening such as a colonoscopy/FIT/Cologuard?    NO    No colonoscopy on file  No cologuard on file  No FIT/FOBT on file   No flexible sigmoidoscopy on file         Click Here for Release of Records Request     This patient is accompanied in the office by his self.  I have received verbal consent from Jhonny Cook Jr. to discuss any/all medical information while they are present in the room.  
Head: Normocephalic and atraumatic.      Mouth/Throat:      Mouth: Mucous membranes are moist.      Pharynx: Oropharynx is clear.      Comments: Poor dentition.  Eyes:      Extraocular Movements: Extraocular movements intact.      Pupils: Pupils are equal, round, and reactive to light.   Cardiovascular:      Rate and Rhythm: Normal rate and regular rhythm.   Pulmonary:      Effort: Pulmonary effort is normal.   Abdominal:      General: Abdomen is flat.      Palpations: Abdomen is soft.   Musculoskeletal:      Right lower leg: No edema.      Left lower leg: No edema.      Comments: Diabetic foot exam:   Sensation grossly: intact  Monofilament test:  intact in 10/10 points  Skin integrity:  intact without lesions  Vascular:  2+ dorsalis pedis pulses bilaterally  Motor: dorsiflexion and plantar flexion 5/5 bilaterally   Skin:     General: Skin is warm and dry.   Neurological:      General: No focal deficit present.      Mental Status: He is alert and oriented to person, place, and time.      Sensory: No sensory deficit.      Motor: No weakness.        Assessment and Plan   Jhonny Calderadioni Paige is a 54 y.o. who presents for physical exam.    1. Encounter for well adult exam without abnormal findings  Routine. Has not been seen in one year. Overall doing well.   - Need for colon cancer screen  - Continue counseling on smoking cessation  - Labs obtained today as below, unable to obtain all needed labs due to financial strain    2. Type 2 diabetes mellitus with hyperglycemia, without long-term current use of insulin (HCC)  Chronic. Last A1c 9.5 one year ago. Pt reports compliance with medications. Not checking home sugars. Currently on metformin 1000 BID, glipizide 10 daily and taking NPH 10U nightly. Has not had follow up since starting insulin. No medication changes made today. Plan to start on GLP1 once insured. On high intensity statin. Consider switching BP medication to ACEi/ARB for renal protection.   - Obtain

## 2024-11-02 LAB
ANION GAP SERPL CALC-SCNC: 8 MMOL/L (ref 2–12)
BUN SERPL-MCNC: 10 MG/DL (ref 6–20)
BUN/CREAT SERPL: 9 (ref 12–20)
CALCIUM SERPL-MCNC: 10.1 MG/DL (ref 8.5–10.1)
CHLORIDE SERPL-SCNC: 98 MMOL/L (ref 97–108)
CO2 SERPL-SCNC: 30 MMOL/L (ref 21–32)
CREAT SERPL-MCNC: 1.16 MG/DL (ref 0.7–1.3)
EST. AVERAGE GLUCOSE BLD GHB EST-MCNC: 266 MG/DL
GLUCOSE SERPL-MCNC: 286 MG/DL (ref 65–100)
HBA1C MFR BLD: 10.9 % (ref 4–5.6)
POTASSIUM SERPL-SCNC: 4.6 MMOL/L (ref 3.5–5.1)
SODIUM SERPL-SCNC: 136 MMOL/L (ref 136–145)
TSH SERPL DL<=0.05 MIU/L-ACNC: 5.31 UIU/ML (ref 0.36–3.74)

## 2024-11-02 RX ORDER — LEVOTHYROXINE SODIUM 150 UG/1
TABLET ORAL
Qty: 60 TABLET | Refills: 0 | Status: SHIPPED | OUTPATIENT
Start: 2024-11-02

## 2024-11-06 NOTE — RESULT ENCOUNTER NOTE
TSH elevated, 5.31. Missed doses of synthroid. Asymptomatic. Will recheck at next visit.  Random glucose 286. A1c 10.9. Uncontrolled diabetes, management with NPH.    Called pt x3, went to . Unable to leave message. Sent letter.  Plan to increase NPH to 10U bid. Patient to follow up in 2 weeks and bring blood sugar logs. When able, plan to start GLP1.

## 2024-11-11 DIAGNOSIS — I10 ESSENTIAL (PRIMARY) HYPERTENSION: ICD-10-CM

## 2024-11-13 RX ORDER — HYDROXYZINE HYDROCHLORIDE 50 MG/1
TABLET, FILM COATED ORAL
Qty: 90 TABLET | Refills: 0 | Status: SHIPPED | OUTPATIENT
Start: 2024-11-13

## 2024-12-04 ENCOUNTER — OFFICE VISIT (OUTPATIENT)
Age: 54
End: 2024-12-04
Payer: MEDICAID

## 2024-12-04 VITALS
BODY MASS INDEX: 26.57 KG/M2 | HEIGHT: 70 IN | WEIGHT: 185.6 LBS | HEART RATE: 66 BPM | RESPIRATION RATE: 18 BRPM | OXYGEN SATURATION: 97 % | SYSTOLIC BLOOD PRESSURE: 131 MMHG | TEMPERATURE: 97.9 F | DIASTOLIC BLOOD PRESSURE: 82 MMHG

## 2024-12-04 DIAGNOSIS — E03.9 HYPOTHYROIDISM, UNSPECIFIED TYPE: ICD-10-CM

## 2024-12-04 DIAGNOSIS — F17.200 CURRENT SMOKER: ICD-10-CM

## 2024-12-04 DIAGNOSIS — E11.65 TYPE 2 DIABETES MELLITUS WITH HYPERGLYCEMIA, WITHOUT LONG-TERM CURRENT USE OF INSULIN (HCC): Primary | ICD-10-CM

## 2024-12-04 DIAGNOSIS — I10 ESSENTIAL (PRIMARY) HYPERTENSION: ICD-10-CM

## 2024-12-04 DIAGNOSIS — E78.5 DYSLIPIDEMIA: ICD-10-CM

## 2024-12-04 PROCEDURE — 99214 OFFICE O/P EST MOD 30 MIN: CPT

## 2024-12-04 RX ORDER — ATORVASTATIN CALCIUM 80 MG/1
80 TABLET, FILM COATED ORAL DAILY
Qty: 90 TABLET | Refills: 1 | Status: SHIPPED | OUTPATIENT
Start: 2024-12-04

## 2024-12-04 RX ORDER — BISOPROLOL FUMARATE AND HYDROCHLOROTHIAZIDE 5; 6.25 MG/1; MG/1
1 TABLET ORAL DAILY
Qty: 90 TABLET | Refills: 1 | Status: SHIPPED | OUTPATIENT
Start: 2024-12-04

## 2024-12-04 RX ORDER — HUMAN INSULIN 100 [IU]/ML
10 INJECTION, SUSPENSION SUBCUTANEOUS
Qty: 1 ADJUSTABLE DOSE PRE-FILLED PEN SYRINGE | Refills: 3 | Status: SHIPPED | OUTPATIENT
Start: 2024-12-04 | End: 2024-12-11 | Stop reason: SDUPTHER

## 2024-12-04 SDOH — ECONOMIC STABILITY: INCOME INSECURITY: HOW HARD IS IT FOR YOU TO PAY FOR THE VERY BASICS LIKE FOOD, HOUSING, MEDICAL CARE, AND HEATING?: VERY HARD

## 2024-12-04 SDOH — ECONOMIC STABILITY: FOOD INSECURITY: WITHIN THE PAST 12 MONTHS, THE FOOD YOU BOUGHT JUST DIDN'T LAST AND YOU DIDN'T HAVE MONEY TO GET MORE.: SOMETIMES TRUE

## 2024-12-04 SDOH — ECONOMIC STABILITY: FOOD INSECURITY: WITHIN THE PAST 12 MONTHS, YOU WORRIED THAT YOUR FOOD WOULD RUN OUT BEFORE YOU GOT MONEY TO BUY MORE.: PATIENT DECLINED

## 2024-12-04 ASSESSMENT — PATIENT HEALTH QUESTIONNAIRE - PHQ9
SUM OF ALL RESPONSES TO PHQ QUESTIONS 1-9: 9
SUM OF ALL RESPONSES TO PHQ QUESTIONS 1-9: 9
2. FEELING DOWN, DEPRESSED OR HOPELESS: SEVERAL DAYS
1. LITTLE INTEREST OR PLEASURE IN DOING THINGS: NOT AT ALL
SUM OF ALL RESPONSES TO PHQ QUESTIONS 1-9: 4
10. IF YOU CHECKED OFF ANY PROBLEMS, HOW DIFFICULT HAVE THESE PROBLEMS MADE IT FOR YOU TO DO YOUR WORK, TAKE CARE OF THINGS AT HOME, OR GET ALONG WITH OTHER PEOPLE: SOMEWHAT DIFFICULT
SUM OF ALL RESPONSES TO PHQ QUESTIONS 1-9: 4
SUM OF ALL RESPONSES TO PHQ9 QUESTIONS 1 & 2: 4
7. TROUBLE CONCENTRATING ON THINGS, SUCH AS READING THE NEWSPAPER OR WATCHING TELEVISION: MORE THAN HALF THE DAYS
SUM OF ALL RESPONSES TO PHQ QUESTIONS 1-9: 4
2. FEELING DOWN, DEPRESSED OR HOPELESS: MORE THAN HALF THE DAYS
8. MOVING OR SPEAKING SO SLOWLY THAT OTHER PEOPLE COULD HAVE NOTICED. OR THE OPPOSITE, BEING SO FIGETY OR RESTLESS THAT YOU HAVE BEEN MOVING AROUND A LOT MORE THAN USUAL: MORE THAN HALF THE DAYS
3. TROUBLE FALLING OR STAYING ASLEEP: SEVERAL DAYS
SUM OF ALL RESPONSES TO PHQ QUESTIONS 1-9: 9
SUM OF ALL RESPONSES TO PHQ9 QUESTIONS 1 & 2: 1
4. FEELING TIRED OR HAVING LITTLE ENERGY: SEVERAL DAYS
6. FEELING BAD ABOUT YOURSELF - OR THAT YOU ARE A FAILURE OR HAVE LET YOURSELF OR YOUR FAMILY DOWN: SEVERAL DAYS
5. POOR APPETITE OR OVEREATING: SEVERAL DAYS
SUM OF ALL RESPONSES TO PHQ QUESTIONS 1-9: 9
1. LITTLE INTEREST OR PLEASURE IN DOING THINGS: MORE THAN HALF THE DAYS
SUM OF ALL RESPONSES TO PHQ QUESTIONS 1-9: 4
9. THOUGHTS THAT YOU WOULD BE BETTER OFF DEAD, OR OF HURTING YOURSELF: NOT AT ALL

## 2024-12-04 ASSESSMENT — ANXIETY QUESTIONNAIRES
3. WORRYING TOO MUCH ABOUT DIFFERENT THINGS: MORE THAN HALF THE DAYS
IF YOU CHECKED OFF ANY PROBLEMS ON THIS QUESTIONNAIRE, HOW DIFFICULT HAVE THESE PROBLEMS MADE IT FOR YOU TO DO YOUR WORK, TAKE CARE OF THINGS AT HOME, OR GET ALONG WITH OTHER PEOPLE: SOMEWHAT DIFFICULT
4. TROUBLE RELAXING: SEVERAL DAYS
7. FEELING AFRAID AS IF SOMETHING AWFUL MIGHT HAPPEN: MORE THAN HALF THE DAYS
2. NOT BEING ABLE TO STOP OR CONTROL WORRYING: MORE THAN HALF THE DAYS
GAD7 TOTAL SCORE: 13
5. BEING SO RESTLESS THAT IT IS HARD TO SIT STILL: MORE THAN HALF THE DAYS
1. FEELING NERVOUS, ANXIOUS, OR ON EDGE: SEVERAL DAYS
6. BECOMING EASILY ANNOYED OR IRRITABLE: NEARLY EVERY DAY

## 2024-12-04 NOTE — PROGRESS NOTES
Identified pt with two pt identifiers(name and ). Reviewed record in preparation for visit and have obtained necessary documentation.  Chief Complaint   Patient presents with    Diabetes        Health Maintenance Due   Topic    Pneumococcal 0-64 years Vaccine (1 of 2 - PCV)    HIV screen     Diabetic retinal exam     Hepatitis C screen     Hepatitis B vaccine (1 of 3 - 19+ 3-dose series)    Colorectal Cancer Screen     Shingles vaccine (1 of 2)    DTaP/Tdap/Td vaccine (2 - Td or Tdap)    Flu vaccine (1)    Diabetic Alb to Cr ratio (uACR) test     Lipids     COVID-19 Vaccine ( season)       Vitals:    24 0915 24 0933   BP: (!) 145/81 131/82   Site: Right Upper Arm Left Upper Arm   Position: Sitting Sitting   Cuff Size: Medium Adult Medium Adult   Pulse: 66 66   Resp: 18    Temp: 97.9 °F (36.6 °C)    TempSrc: Oral    SpO2: 97%    Weight: 84.2 kg (185 lb 9.6 oz)    Height: 1.778 m (5' 10\")          \"Have you been to the ER, urgent care clinic since your last visit?  Hospitalized since your last visit?\"    NO    “Have you seen or consulted any other health care providers outside of [facility] since your last visit?”    NO        “Have you had a colorectal cancer screening such as a colonoscopy/FIT/Cologuard?    NO    No colonoscopy on file  No cologuard on file  No FIT/FOBT on file   No flexible sigmoidoscopy on file         Click Here for Release of Records Request     This patient is accompanied in the office by his self.  I have received verbal consent from Johnny Cook Jr. to discuss any/all medical information while they are present in the room.

## 2024-12-04 NOTE — PROGRESS NOTES
[address] [id number]   Office [phone], Fax [phone]    Subjective   Jhonny Cook Jr. is a 54 y.o. male who presents for Diabetes    #Diabetes follow up  Managed with metformin 1000 BID, insulin NPH 10U nightly, glipizide 10mg (pt discontinued). Reports compliance to medication.  Now has SNAP benefits  Diet: coffee with creamer, yogurt, juice, drinks coke / sprite a few times per day, broccoli  Poor dentition - will see dentist  Exercise: working outside  Home sugars: fasting 200-250s / PP 400s-500s (at times has chest pain, lightheadedness)  Hemoglobin A1C   Date Value Ref Range Status   11/01/2024 10.9 (H) 4.0 - 5.6 % Final     Comment:     (NOTE)  HbA1C Interpretive Ranges  <5.7              Normal  5.7 - 6.4         Consider Prediabetes  >6.5              Consider Diabetes     Annual foot exam: Checking feet, no issues  Annual eye exam: Will make appointment  Denies lightheadedness, confusion, dizziness, syncope  Denies increased thirst or urinary frequency.    #Mood  Overall stable.  PHQ 11  AURORA 13    Review of Systems   Review of Systems   Constitutional:  Positive for fatigue. Negative for chills and fever.   Respiratory:  Negative for shortness of breath.    Cardiovascular:  Positive for palpitations. Negative for chest pain.   Gastrointestinal:  Negative for abdominal pain, diarrhea, nausea and vomiting.   Endocrine: Negative for cold intolerance, heat intolerance, polydipsia, polyphagia and polyuria.   Genitourinary:  Negative for difficulty urinating, dysuria and frequency.   Neurological:  Negative for tremors, syncope, weakness, light-headedness, numbness and headaches.   Psychiatric/Behavioral:  Negative for confusion. The patient is nervous/anxious.       Medical History  Past Medical History:   Diagnosis Date    Anxiety     Chest pain     Diverticulosis     Hypothyroid 7/20/2010    Kidney stone 7/20/2010     Medications  Current Outpatient Medications

## 2024-12-04 NOTE — PATIENT INSTRUCTIONS
[facility]  [address]  [place], [place] [address]  Get DirectionsTel: [phone]       [facility] Financial Resources*  (Call United Way/211 if need more resources.)    Medical Care  [employer] Financial Assistance  What they offer: The [employer] Financial Assistance Program helps uninsured patients who do not qualify for government-sponsored health insurance and cannot afford to pay for their medical care. Insured patients may also qualify for assistance based on family income, family size, and medical needs.  Phone Number: [phone]  How to apply for the [employer] Financial Assistance Program:  Option 1: To apply for financial assistance, a patient (or their family or other provider) should fill out the Financial Assistance Application. Copies of the Financial Assistance Application and the FAP may be obtained for free by calling the inDegreeer service department at [phone].  Option 2: The Financial Assistance Application and policy may be obtained for free by downloading a copy from the [employer] website:  https://www.[employer]/patient-resources/financial-assistance  Applications are available in several languages on the website    [facility] Financial Assistance  What they offer: [facility] has a Financial Assistance Policy that provides free or discounted health care to qualified patients.  Website:  https://PÃºbliKo.UrtheCast/patient-financial/pricing  Phone Number for Patient Benefit Advisors: [phone]    [facility] Financial Assistance  What they offer: Help with understanding a bill, finding out what insurance pays, applying for financial aid, or setting up a payment plan.  Website:  https://EyeCyte/services/billing-insurance/axpgkuqjk-rnrkajpoii-abvfsmccvik  Financial Counseling Call Center: [phone]      [facility]  What they offer:   Mobile healthcare resources for uninsured patients.  Website:

## 2024-12-05 LAB
CHOLEST SERPL-MCNC: 91 MG/DL
CREAT UR-MCNC: 89.6 MG/DL
HDLC SERPL-MCNC: 30 MG/DL
HDLC SERPL: 3 (ref 0–5)
LDLC SERPL CALC-MCNC: 35.4 MG/DL (ref 0–100)
MICROALBUMIN UR-MCNC: 0.53 MG/DL
MICROALBUMIN/CREAT UR-RTO: 6 MG/G (ref 0–30)
TRIGL SERPL-MCNC: 128 MG/DL
TSH SERPL DL<=0.05 MIU/L-ACNC: 1.77 UIU/ML (ref 0.36–3.74)
VLDLC SERPL CALC-MCNC: 25.6 MG/DL

## 2024-12-10 ASSESSMENT — ENCOUNTER SYMPTOMS
VOMITING: 0
DIARRHEA: 0
SHORTNESS OF BREATH: 0
NAUSEA: 0
ABDOMINAL PAIN: 0

## 2024-12-11 ENCOUNTER — TELEPHONE (OUTPATIENT)
Age: 54
End: 2024-12-11

## 2024-12-11 DIAGNOSIS — E11.65 TYPE 2 DIABETES MELLITUS WITH HYPERGLYCEMIA, WITHOUT LONG-TERM CURRENT USE OF INSULIN (HCC): ICD-10-CM

## 2024-12-11 RX ORDER — HUMAN INSULIN 100 [IU]/ML
10 INJECTION, SUSPENSION SUBCUTANEOUS
Qty: 6 ML | Refills: 5 | OUTPATIENT
Start: 2024-12-11

## 2024-12-11 NOTE — TELEPHONE ENCOUNTER
Writer called the pt again to make him aware that his Novolin N Flex Pen (2 pens) was ran through his insurance by the pharmacy and approved with a zero dollar copay.  He verbalized understanding and states he will go pick them up.

## 2024-12-11 NOTE — TELEPHONE ENCOUNTER
Patient called stating that both he and the pharmacy has been trying to get in contact with the doctor for some time now in regards to needing PA for the Novolin N Flexpen. Patient stated that he down to his last dose of the medication. Patient is requesting that this is taken care of at the earliest convenience so he doesn't have to run out of the medication.    Thanks!

## 2024-12-11 NOTE — TELEPHONE ENCOUNTER
Writer spoke to Dr. Ramses Bolanos regarding the pt's Novolin N Flex Pen and the fact that the pt's insurance wouldn't recognize Dr. Naranjo's NPI number.  Per Dr. Bolanos \"Call the pt's [facility] pharmacy and give a VO for Novolin N Flex Pen 100unit/ml Inject 10 Units into the skin 2 times daily (before meals), Disp-6 mL, R-5.\"  Writer called the pt's [facility] pharmacy and gave VO exactly as Dr. Bolanos stated.  Spoke to pharmacist Leatha who took the VO and stated she ran the script through the pt's insurance and they have approved 2 pens of the Novolin N Flex Pen and they will have them ready soon for pt to .  Writer called the pt x 4 to make him aware with no answer.  Message was left.

## 2024-12-11 NOTE — TELEPHONE ENCOUNTER
Writer spoke to Dr. Ramses Bolanos regarding the pt's Novolin N Flex Pen and the fact that the pt's insurance wouldn't recognize Dr. Naranjo's NPI number.  Per Dr. Bolanos \"Call the pt's [facility] pharmacy and give a VO for Novolin N Flex Pen 100unit/ml Inject 10 Units into the skin 2 times daily (before meals), Disp-6 mL, R-5.\"  Writer called the pt's [facility] pharmacy and gave VO exactly as Dr. Bolanos stated.  Spoke to pharmacist Leatha who took the VO and stated she ran the script through the pt's insurance and they have approved 2 pens of the Novolin N Flex Pen and they will have them ready soon for pt to .  Writer called the pt x 4 to make him aware with no answer.  Message was left.

## 2024-12-11 NOTE — TELEPHONE ENCOUNTER
Spoke to Leatha at [facility]   Novolin N insulin was approved by Dr Luis Miguel Zhang pharmacist ran rx with insurnace and states 2 pens approved with 0$ copay   Rx was also given 5 refills  Pt currently takes 10 units BID   Also asked pharmacist why they were breaking up box of insulin when boxes come in 5 pack   She states they are allowed to do this  And 2 pens should last pt 30 days   Attempted to call pt , no answer

## 2024-12-30 DIAGNOSIS — I10 ESSENTIAL (PRIMARY) HYPERTENSION: ICD-10-CM

## 2024-12-30 NOTE — TELEPHONE ENCOUNTER
Medication Refill Request    Jhonny LEÓN Cook Jr. is requesting a refill of the following medication(s):   Requested Prescriptions     Pending Prescriptions Disp Refills    hydrOXYzine HCl (ATARAX) 50 MG tablet 90 tablet 0     Sig: TAKE 2 TABLETS BY MOUTH EVERY 6 HOURS AS NEEDED FOR ANXIETY        Listed PCP is Laina Snow MD   Last provider to prescribe medication: Dr. Naranjo  Last Date of Medication Prescribed: 11/13/24   Date of Last Office Visit at [facility]: 12/04/24   FUTURE APPOINTMENT: No future appointments.    Please send refill to:    [facility] [address] [place] [address] [id number] [place] [address] [phone] -  [phone]  [address] [place] [address]  Phone: [phone] Fax: [phone]      Please review request and approve or deny with recommendations.

## 2024-12-31 RX ORDER — HYDROXYZINE HYDROCHLORIDE 50 MG/1
TABLET, FILM COATED ORAL
Qty: 90 TABLET | Refills: 0 | Status: SHIPPED | OUTPATIENT
Start: 2024-12-31

## 2025-01-13 ENCOUNTER — TELEPHONE (OUTPATIENT)
Age: 55
End: 2025-01-13

## 2025-01-13 NOTE — TELEPHONE ENCOUNTER
Pt stated that he has been out of levothyroxine (SYNTHROID) 150 MCG tablet for 2 days. He is requesting that a refill of the medication is sent ASAP to [id number] [place] [address] [phone] - F [phone].    Thank you

## 2025-01-14 DIAGNOSIS — E03.9 HYPOTHYROIDISM, UNSPECIFIED TYPE: ICD-10-CM

## 2025-01-14 DIAGNOSIS — E11.65 TYPE 2 DIABETES MELLITUS WITH HYPERGLYCEMIA, WITHOUT LONG-TERM CURRENT USE OF INSULIN (HCC): ICD-10-CM

## 2025-01-14 NOTE — TELEPHONE ENCOUNTER
Medication Refill Request    Jhonny LEÓN Cook Jr. is requesting a refill of the following medication(s):   Requested Prescriptions     Pending Prescriptions Disp Refills    levothyroxine (SYNTHROID) 150 MCG tablet 60 tablet 0     Sig: TAKE 2 TABLETS BY MOUTH ONCE DAILY BEFORE BREAKFAST    Insulin Pen Needle (PEN NEEDLES) 32G X 6 MM MISC 100 each 0     Si each by Does not apply route daily        Listed PCP is Laina Snow MD   Last provider to prescribe medication: Dr. Naranjo  Last Date of Medication Prescribed: 24   Date of Last Office Visit at [facility]: 24   FUTURE APPOINTMENT: No future appointments.    Please send refill to:    [facility] [address] [place] [address] [id number] [place] [address] [phone] - F [phone]  [address] [place] [address]  Phone: [phone] Fax: [phone]      Please review request and approve or deny with recommendations.

## 2025-01-15 RX ORDER — LEVOTHYROXINE SODIUM 150 UG/1
TABLET ORAL
Qty: 60 TABLET | Refills: 0 | Status: SHIPPED | OUTPATIENT
Start: 2025-01-15

## 2025-01-15 RX ORDER — BLOOD SUGAR DIAGNOSTIC
1 STRIP MISCELLANEOUS DAILY
Qty: 100 EACH | Refills: 0 | Status: SHIPPED | OUTPATIENT
Start: 2025-01-15

## 2025-02-14 DIAGNOSIS — E03.9 HYPOTHYROIDISM, UNSPECIFIED TYPE: ICD-10-CM

## 2025-02-14 RX ORDER — LEVOTHYROXINE SODIUM 150 UG/1
TABLET ORAL
Qty: 60 TABLET | Refills: 0 | Status: SHIPPED | OUTPATIENT
Start: 2025-02-14

## 2025-02-14 NOTE — TELEPHONE ENCOUNTER
Medication Refill Request    Jhonny LEÓN Cook Jr. is requesting a refill of the following medication(s):   Requested Prescriptions     Pending Prescriptions Disp Refills    levothyroxine (SYNTHROID) 150 MCG tablet 60 tablet 0     Sig: TAKE 2 TABLETS BY MOUTH ONCE DAILY BEFORE BREAKFAST        Listed PCP is GraLaina Rollins MD   Last provider to prescribe medication: Dr. Naranjo  Last Date of Medication Prescribed: 01/15/2025   Date of Last Office Visit at [facility]: 12/04/24   FUTURE APPOINTMENT: No future appointments.    Please send refill to:    [facility] [address] [place] [address] [phone] - F [phone]  [address] [place] [address]  Phone: [phone] Fax: [phone]      Please review request and approve or deny with recommendations.

## 2025-02-18 ENCOUNTER — TELEPHONE (OUTPATIENT)
Age: 55
End: 2025-02-18

## 2025-02-18 DIAGNOSIS — I10 ESSENTIAL (PRIMARY) HYPERTENSION: ICD-10-CM

## 2025-02-18 RX ORDER — HYDROXYZINE HYDROCHLORIDE 50 MG/1
TABLET, FILM COATED ORAL
Qty: 90 TABLET | Refills: 0 | Status: SHIPPED | OUTPATIENT
Start: 2025-02-18

## 2025-02-18 NOTE — TELEPHONE ENCOUNTER
Medication Refill Request    Jhonny LEÓN Cook Jr. is requesting a refill of the following medication(s):   Requested Prescriptions     Pending Prescriptions Disp Refills    hydrOXYzine HCl (ATARAX) 50 MG tablet 90 tablet 0     Sig: TAKE 2 TABLETS BY MOUTH EVERY 6 HOURS AS NEEDED FOR ANXIETY        Listed PCP is Laina Snow MD   Last provider to prescribe medication: Dr. Naranjo   Last Date of Medication Prescribed: 12/31/24   Date of Last Office Visit at [facility]: 12/04/24   FUTURE APPOINTMENT: No future appointments.    Please send refill to:    [facility] [address] [place] [address] [id number] [place] [address] [phone] -  [phone]  [address] [place] [address]  Phone: [phone] Fax: [phone]      Please review request and approve or deny with recommendations.

## 2025-02-18 NOTE — TELEPHONE ENCOUNTER
Hi Dr. Sammie Naranjo,    Patient called an stated that he took his last pill this morning. He also stated that the pharmacy sent request for this medication twice last week. He is requesting a refill on his medication:    hydrOXYzine HCl (ATARAX) 50 MG tablet     Please send to:    Pharmacy    [facility] [address] [place] [address] [facility] -  [phone] - F [phone] [address] [facility] [address]  Phone: [phone]  Fax: [phone]

## 2025-02-28 DIAGNOSIS — F32.A ANXIETY AND DEPRESSION: ICD-10-CM

## 2025-02-28 DIAGNOSIS — F41.9 ANXIETY AND DEPRESSION: ICD-10-CM

## 2025-02-28 NOTE — TELEPHONE ENCOUNTER
Medication Refill Request    Jhonny LEÓN Calderadioni  is requesting a refill of the following medication(s):   Requested Prescriptions     Pending Prescriptions Disp Refills    busPIRone (BUSPAR) 15 MG tablet 90 tablet 1     Sig: Take 15 mg by mouth 2 times daily        Listed PCP is GraLaina Rollins MD   Last provider to prescribe medication: Dr. Naranjo  Last Date of Medication Prescribed: 11/01/24   Date of Last Office Visit at [facility]: 12/04/24   FUTURE APPOINTMENT: No future appointments.    Please send refill to:    [facility] [address] [place] [address] [phone] - F [phone]  [address] [place] [address]  Phone: [phone] Fax: [phone]      Please review request and approve or deny with recommendations.

## 2025-03-03 NOTE — TELEPHONE ENCOUNTER
Pt stated that he is now out of medication. He stated that first medication request was early last week. He was informed that original request received was on Friday, 2/28 and the dr has up to 48 business hours.     He is requesting that this medication is sent to pharmacy asap.    Thank you

## 2025-03-05 RX ORDER — BUSPIRONE HYDROCHLORIDE 15 MG/1
15 TABLET ORAL 2 TIMES DAILY
Qty: 90 TABLET | Refills: 1 | Status: SHIPPED | OUTPATIENT
Start: 2025-03-05

## 2025-03-24 DIAGNOSIS — E11.65 TYPE 2 DIABETES MELLITUS WITH HYPERGLYCEMIA, WITHOUT LONG-TERM CURRENT USE OF INSULIN (HCC): ICD-10-CM

## 2025-03-24 NOTE — TELEPHONE ENCOUNTER
Medication Refill Request    Jhonny TRAORE Joey Paige is requesting a refill of the following medication(s):   Requested Prescriptions     Pending Prescriptions Disp Refills    Insulin Pen Needle (PEN NEEDLES) 32G X 6 MM MISC 100 each 0     Si each by Does not apply route daily        Listed PCP is Laina Snow MD   Last provider to prescribe medication: Dr. Naranjo  Last Date of Medication Prescribed: 01/15/25   Date of Last Office Visit at [facility]: 24   FUTURE APPOINTMENT:   Future Appointments   Date Time Provider Department Center   3/25/2025  9:40 AM Laina Snow MD [facility] DEP       Please send refill to:    [facility] [address] [place] [address] - [id number] [place] [address] [phone] - F [phone]  [address] [place] [address]  Phone: [phone] Fax: [phone]      Please review request and approve or deny with recommendations.

## 2025-03-25 ENCOUNTER — TELEPHONE (OUTPATIENT)
Age: 55
End: 2025-03-25

## 2025-03-25 ENCOUNTER — OFFICE VISIT (OUTPATIENT)
Age: 55
End: 2025-03-25
Payer: MEDICAID

## 2025-03-25 VITALS
SYSTOLIC BLOOD PRESSURE: 132 MMHG | BODY MASS INDEX: 27.43 KG/M2 | HEIGHT: 70 IN | HEART RATE: 55 BPM | RESPIRATION RATE: 18 BRPM | DIASTOLIC BLOOD PRESSURE: 78 MMHG | OXYGEN SATURATION: 97 % | WEIGHT: 191.6 LBS | TEMPERATURE: 97.9 F

## 2025-03-25 DIAGNOSIS — F41.9 ANXIETY AND DEPRESSION: ICD-10-CM

## 2025-03-25 DIAGNOSIS — E11.65 TYPE 2 DIABETES MELLITUS WITH HYPERGLYCEMIA, WITHOUT LONG-TERM CURRENT USE OF INSULIN (HCC): Primary | ICD-10-CM

## 2025-03-25 DIAGNOSIS — F32.A ANXIETY AND DEPRESSION: ICD-10-CM

## 2025-03-25 DIAGNOSIS — Z72.0 CURRENT TOBACCO USE: ICD-10-CM

## 2025-03-25 LAB — HBA1C MFR BLD: 10.4 %

## 2025-03-25 PROCEDURE — 83036 HEMOGLOBIN GLYCOSYLATED A1C: CPT

## 2025-03-25 PROCEDURE — 99214 OFFICE O/P EST MOD 30 MIN: CPT

## 2025-03-25 RX ORDER — HYDROXYZINE HYDROCHLORIDE 50 MG/1
TABLET, FILM COATED ORAL
Qty: 90 TABLET | Refills: 0 | Status: SHIPPED | OUTPATIENT
Start: 2025-03-25

## 2025-03-25 RX ORDER — HUMAN INSULIN 100 [IU]/ML
20 INJECTION, SUSPENSION SUBCUTANEOUS
Qty: 12 ML | Refills: 0 | Status: SHIPPED | OUTPATIENT
Start: 2025-03-25 | End: 2025-03-27

## 2025-03-25 SDOH — ECONOMIC STABILITY: FOOD INSECURITY: WITHIN THE PAST 12 MONTHS, THE FOOD YOU BOUGHT JUST DIDN'T LAST AND YOU DIDN'T HAVE MONEY TO GET MORE.: NEVER TRUE

## 2025-03-25 SDOH — ECONOMIC STABILITY: FOOD INSECURITY: WITHIN THE PAST 12 MONTHS, YOU WORRIED THAT YOUR FOOD WOULD RUN OUT BEFORE YOU GOT MONEY TO BUY MORE.: NEVER TRUE

## 2025-03-25 ASSESSMENT — PATIENT HEALTH QUESTIONNAIRE - PHQ9
SUM OF ALL RESPONSES TO PHQ QUESTIONS 1-9: 1
1. LITTLE INTEREST OR PLEASURE IN DOING THINGS: NOT AT ALL
2. FEELING DOWN, DEPRESSED OR HOPELESS: SEVERAL DAYS
SUM OF ALL RESPONSES TO PHQ QUESTIONS 1-9: 1

## 2025-03-25 NOTE — PROGRESS NOTES
[address] [id number]   Office [phone], Fax [phone]    Subjective   Jhonny Cook Jr. is a 54 y.o. male who presents for Diabetes    #DM  Managed with metformin 1000mg BID, NPH 10U BID. Reports compliance to medication. No low blood sugars.   Diet: Soups mainly. Broccoli, tuna, grean beans. Chicken sandwiches. Potatoes once in a while. Eating mentos more, handful daily. 3-4x of 6 packs of mentos weekly. Occasional reeses.  Drinks: Water, zero sugar drinks, zero sugar sprite  Exercise: Working out in the yard with father / housework  Home sugars: fastings 210-427. 2hPP (-410; lunch 277-447; -436)  A1c 10.9 > 10.4.  Annual foot exam Reports checking feet regularly. Feet feel tingling for the past week.   Annual eye exam: Vision is more blurry. Has not seen eye doctor since 2018.   Denies lightheadedness, confusion, dizziness, syncope, heart palpitations, fast heart beat.  Denies increased thirst or dehydration.  Increased urinary frequency.    #Smoking  Using mentos to help quit   Doing 1/2 pack  Helping around the house  Reports he is not interested in quitting     Medical History  Past Medical History:   Diagnosis Date    Anxiety     Chest pain     Diverticulosis     Hypothyroid 7/20/2010    Kidney stone 7/20/2010       Medications  Current Outpatient Medications   Medication Sig    busPIRone (BUSPAR) 15 MG tablet Take 15 mg by mouth 2 times daily    hydrOXYzine HCl (ATARAX) 50 MG tablet TAKE 2 TABLETS BY MOUTH EVERY 6 HOURS AS NEEDED FOR ANXIETY    levothyroxine (SYNTHROID) 150 MCG tablet TAKE 2 TABLETS BY MOUTH ONCE DAILY BEFORE BREAKFAST    Insulin Pen Needle (PEN NEEDLES) 32G X 6 MM MISC 1 each by Does not apply route daily    insulin NPH (NOVOLIN N FLEXPEN) 100 UNIT/ML injection pen Inject 10 Units into the skin 2 times daily (before meals)    bisoprolol-hydroCHLOROthiazide (ZIAC) 5-6.25 MG per tablet Take 1 tablet by mouth daily    atorvastatin (LIPITOR) 80

## 2025-03-25 NOTE — PATIENT INSTRUCTIONS
Increase your insulin to 20 units twice a day.  Please decrease the mentos and other sugary foods you're eating.   Continue drinking only zero sugar drinks.  Please follow up in 2 weeks

## 2025-03-25 NOTE — PROGRESS NOTES
Identified pt with two pt identifiers(name and ). Reviewed record in preparation for visit and have obtained necessary documentation.  Chief Complaint   Patient presents with    Diabetes        Health Maintenance Due   Topic    HIV screen     Diabetic retinal exam     Hepatitis C screen     Hepatitis B vaccine (1 of 3 - 19+ 3-dose series)    Pneumococcal 50+ years Vaccine (1 of 2 - PCV)    Colorectal Cancer Screen     Shingles vaccine (1 of 2)    DTaP/Tdap/Td vaccine (2 - Td or Tdap)    Flu vaccine (1)    COVID-19 Vaccine ( season)    A1C test (Diabetic or Prediabetic)        Vitals:    25 0931   BP: 132/78   BP Site: Left Upper Arm   Patient Position: Sitting   BP Cuff Size: Medium Adult   Pulse: 55   Resp: 18   Temp: 97.9 °F (36.6 °C)   TempSrc: Oral   SpO2: 97%   Weight: 86.9 kg (191 lb 9.6 oz)   Height: 1.778 m (5' 10\")         \"Have you been to the ER, urgent care clinic since your last visit?  Hospitalized since your last visit?\"    NO    “Have you seen or consulted any other health care providers outside of [facility] since your last visit?”    NO        “Have you had a colorectal cancer screening such as a colonoscopy/FIT/Cologuard?    NO    No colonoscopy on file  No cologuard on file  No FIT/FOBT on file   No flexible sigmoidoscopy on file         Click Here for Release of Records Request     This patient is accompanied in the office by his self.  I have received verbal consent from Jhonny Cook Jr. to discuss any/all medical information while they are present in the room.

## 2025-03-25 NOTE — TELEPHONE ENCOUNTER
Pt requesting that we call his [facility] pharmacy after they just told him that they would not be able to fill the new insulin order until 4/7. Pt reports he is almost out of insulin, would not be able to wait until 4/7 especially given that Dr. Naranjo said she was doubling his insulin after today's office visit.    [facility] [address] [place] [address] [facility] [phone] - F [phone]

## 2025-03-26 ENCOUNTER — RESULTS FOLLOW-UP (OUTPATIENT)
Age: 55
End: 2025-03-26

## 2025-03-26 RX ORDER — BLOOD SUGAR DIAGNOSTIC
1 STRIP MISCELLANEOUS DAILY
Qty: 100 EACH | Refills: 0 | Status: SHIPPED | OUTPATIENT
Start: 2025-03-26

## 2025-03-27 ENCOUNTER — TELEPHONE (OUTPATIENT)
Age: 55
End: 2025-03-27

## 2025-03-27 RX ORDER — HUMAN INSULIN 100 [IU]/ML
20 INJECTION, SUSPENSION SUBCUTANEOUS
Qty: 15 ML | Refills: 0 | Status: SHIPPED | OUTPATIENT
Start: 2025-03-27

## 2025-03-27 NOTE — TELEPHONE ENCOUNTER
Amor Naranjo,    Patient called and stated that since his dosage gas been double he does not have enough insulin to last him through the weekend. Although you sent over two pens patient insurance will not cover until Wednesday. Patient has been taking 15 units instead of 20 units to try to make it last. He took his blood sugar this morning and it was 469.

## 2025-03-27 NOTE — TELEPHONE ENCOUNTER
Spoke to [facility]   Pharmacy states pt needs to call insurance and get override for insulin   It will not cover insulin to be filled even with dose increase of 20 units BID  1 pen is $8.58

## 2025-03-27 NOTE — TELEPHONE ENCOUNTER
Called and spoke with Pharmacy (Leatha) who stated that patient's insurance will not cover medication until 04/02/2025. Even after submitting prescription with codes for therapy change. Pharmacy stated that patient has been informed that he \"may need to call insurance for the override.\"

## 2025-03-27 NOTE — TELEPHONE ENCOUNTER
Spoke with Jhonny Cook Jr. who identified self with two patient identifiers (name and ).    On Release of Information Form? N/A, self    This nurse informed patient that message was received and that call is for follow up. Patient confirmed information stated in previous message.     Home blood glucose this morning reported as 469 with patient confirming consuming coffee with \"couple of spoon full\" of creamer and sugar.    Patient denies any chest pain, palpitations, shortness of breath, visual changes, severe headaches. Patient does endorse increased water intake and urination, but that increased water intake is intentional and that he expects the increased urination.    Patient advised to contact [facility] [employer] located on insurance card (Phone: [phone] / ID: [id number]) and to return call to this nurse for follow up.    Patient agreed and voiced understanding to advice provided with opportunity for questions/concerns. No further questions or concerns at this time.     Malik Mitchell RN

## 2025-03-27 NOTE — TELEPHONE ENCOUNTER
Called patient; no answer. Left message for patient to return call to the office.     Called pharmacy who confirmed that medication was able to be processed and that nothing else needs to be done.

## 2025-03-28 ENCOUNTER — TELEPHONE (OUTPATIENT)
Age: 55
End: 2025-03-28

## 2025-03-29 NOTE — TELEPHONE ENCOUNTER
Telephone Encounter:     Called patient ~8:03 PM. Confirmed  and name. Was able to  insulin pens and now taking 20U NPH BID. Most recent PP blood sugar 162. Doing well, feeling much better overall. Trying to cut down on mentos, switched to sugar free mints. Reviewed precautions for hypoglycemia, blood sugars <100. Recommended to check blood sugars if symptoms develop. Has follow up on 25. All questions addressed.    Laina Sammie Naranjo MD  25

## 2025-04-02 DIAGNOSIS — E03.9 HYPOTHYROIDISM, UNSPECIFIED TYPE: ICD-10-CM

## 2025-04-02 NOTE — TELEPHONE ENCOUNTER
Medication Refill Request    Jhonny Cook  is requesting a refill of the following medication(s):   Requested Prescriptions     Pending Prescriptions Disp Refills    levothyroxine (SYNTHROID) 150 MCG tablet 60 tablet 0     Sig: TAKE 2 TABLETS BY MOUTH ONCE DAILY BEFORE BREAKFAST        Listed PCP is Laina Snow MD   Last provider to prescribe medication: Dr. Naranjo  Last Date of Medication Prescribed: 02/14/25   Date of Last Office Visit at [facility]: 03/25/25   FUTURE APPOINTMENT:   Future Appointments   Date Time Provider Department Center   4/14/2025  2:20 PM Laina Snow MD [facility] DEP       Please send refill to:    [facility] [address] [place] [address] [id number] [place] [address] [phone] F [phone]  [address] [place] [address]  Phone: [phone] Fax: [phone]      Please review request and approve or deny with recommendations.

## 2025-04-05 RX ORDER — LEVOTHYROXINE SODIUM 150 UG/1
TABLET ORAL
Qty: 60 TABLET | Refills: 0 | Status: SHIPPED | OUTPATIENT
Start: 2025-04-05

## 2025-04-14 ENCOUNTER — OFFICE VISIT (OUTPATIENT)
Age: 55
End: 2025-04-14
Payer: MEDICAID

## 2025-04-14 VITALS
RESPIRATION RATE: 20 BRPM | OXYGEN SATURATION: 98 % | DIASTOLIC BLOOD PRESSURE: 63 MMHG | TEMPERATURE: 98.1 F | BODY MASS INDEX: 27.09 KG/M2 | HEART RATE: 58 BPM | SYSTOLIC BLOOD PRESSURE: 100 MMHG | WEIGHT: 189.2 LBS | HEIGHT: 70 IN

## 2025-04-14 DIAGNOSIS — Z12.11 COLON CANCER SCREENING: ICD-10-CM

## 2025-04-14 DIAGNOSIS — K08.9 POOR DENTITION: ICD-10-CM

## 2025-04-14 DIAGNOSIS — Z72.0 TOBACCO USE: ICD-10-CM

## 2025-04-14 DIAGNOSIS — E11.65 TYPE 2 DIABETES MELLITUS WITH HYPERGLYCEMIA, WITHOUT LONG-TERM CURRENT USE OF INSULIN (HCC): Primary | ICD-10-CM

## 2025-04-14 PROCEDURE — 99214 OFFICE O/P EST MOD 30 MIN: CPT

## 2025-04-14 ASSESSMENT — PATIENT HEALTH QUESTIONNAIRE - PHQ9
SUM OF ALL RESPONSES TO PHQ QUESTIONS 1-9: 2
SUM OF ALL RESPONSES TO PHQ QUESTIONS 1-9: 2
2. FEELING DOWN, DEPRESSED OR HOPELESS: SEVERAL DAYS
SUM OF ALL RESPONSES TO PHQ QUESTIONS 1-9: 2
SUM OF ALL RESPONSES TO PHQ QUESTIONS 1-9: 2
1. LITTLE INTEREST OR PLEASURE IN DOING THINGS: SEVERAL DAYS

## 2025-04-14 NOTE — PROGRESS NOTES
Identified pt with two pt identifiers(name and ). Reviewed record in preparation for visit and have obtained necessary documentation.  Chief Complaint   Patient presents with    Diabetes        Health Maintenance Due   Topic    HIV screen     Diabetic retinal exam     Hepatitis C screen     Hepatitis B vaccine (1 of 3 - 19+ 3-dose series)    Pneumococcal 50+ years Vaccine (1 of 2 - PCV)    Colorectal Cancer Screen     Shingles vaccine (1 of 2)    DTaP/Tdap/Td vaccine (2 - Td or Tdap)    COVID-19 Vaccine ( season)       Vitals:    25 1436   BP: 100/63   BP Site: Right Upper Arm   Patient Position: Sitting   BP Cuff Size: Medium Adult   Pulse: 58   Resp: 20   Temp: 98.1 °F (36.7 °C)   TempSrc: Oral   SpO2: 98%   Weight: 85.8 kg (189 lb 3.2 oz)   Height: 1.778 m (5' 10\")         \"Have you been to the ER, urgent care clinic since your last visit?  Hospitalized since your last visit?\"    NO    “Have you seen or consulted any other health care providers outside of [facility] since your last visit?”    NO        “Have you had a colorectal cancer screening such as a colonoscopy/FIT/Cologuard?    NO    No colonoscopy on file  No cologuard on file  No FIT/FOBT on file   No flexible sigmoidoscopy on file         Click Here for Release of Records Request     This patient is accompanied in the office by his self.  I have received verbal consent from Jhonny Cook Jr. to discuss any/all medical information while they are present in the room.

## 2025-04-14 NOTE — PROGRESS NOTES
[address] [place] [address]   Office [phone], Fax [phone]    Subjective   Jhonny Cook . is a 54 y.o. male who presents for Diabetes    #DM follow up  Managed with 20U NPH BID. Reports compliance to medication. Not having any side effects.  Diet: tuna, chicken patties, chicken, cut out mentos for the most part (particularly after sugars improved, eating sugar free candies). Popcorn chips, bean dip. Diet sodas only.  Exercise: work around the house  Home sugars: 171-300s.  Annual foot exam:  Reports checking feet regularly.  Annual eye exam:Will schedule eye appointment in [place].  Has a dentist appointment 4/15  Denies lightheadedness, confusion, dizziness, syncope, heart palpitations, fast heart beat.   Denies increased thirst or urinary frequency.    #smoking cessation  Now using ~1/2 ppd. Not really trying to cut down, feeling like mentos/livesavers were helping cravings.   Smokes more when working.     #HM  - CSY last, unsure when. Had diverticulitis.Due for screening    Review of Systems   Pertinent ROS as above.    Medical History  Past Medical History:   Diagnosis Date    Anxiety     Chest pain     Diverticulosis     Hypothyroid 7/20/2010    Kidney stone 7/20/2010       Medications  Current Outpatient Medications   Medication Sig    levothyroxine (SYNTHROID) 150 MCG tablet TAKE 2 TABLETS BY MOUTH ONCE DAILY BEFORE BREAKFAST (Patient taking differently: TAKE 2 TABLETS BY MOUTH ONCE DAILY BEFORE BREAKFAST EXCEPT WEDNESDAYS AND SUNDAYS)    insulin NPH (NOVOLIN N FLEXPEN) 100 UNIT/ML injection pen Inject 20 Units into the skin 2 times daily (before meals)    Insulin Pen Needle (PEN NEEDLES) 32G X 6 MM MISC 1 each by Does not apply route daily    hydrOXYzine HCl (ATARAX) 50 MG tablet TAKE 2 TABLETS BY MOUTH EVERY 6 HOURS AS NEEDED FOR ANXIETY    busPIRone (BUSPAR) 15 MG tablet Take 15 mg by mouth 2 times daily    bisoprolol-hydroCHLOROthiazide (ZIAC) 5-6.25 MG per tablet

## 2025-05-05 DIAGNOSIS — E11.65 TYPE 2 DIABETES MELLITUS WITH HYPERGLYCEMIA, WITHOUT LONG-TERM CURRENT USE OF INSULIN (HCC): ICD-10-CM

## 2025-05-05 NOTE — TELEPHONE ENCOUNTER
Medication Refill Request    Jhonny Cook Jr. is requesting a refill of the following medication(s):   Requested Prescriptions     Pending Prescriptions Disp Refills    insulin NPH (NOVOLIN N FLEXPEN) 100 UNIT/ML injection pen 15 mL 0     Sig: Inject 20 Units into the skin 2 times daily (before meals)        Listed PCP is GraLaina Rollins MD   Last provider to prescribe medication: Dr. Naranjo  Last Date of Medication Prescribed: 03/27/25   Date of Last Office Visit at [facility]: 04/14/25   FUTURE APPOINTMENT: No future appointments.    Please send refill to:    [facility] [address] [place] [address] [id number] [place] [address] [phone] -  [phone]  [address] [place] [address]  Phone: [phone] Fax: [phone]      Please review request and approve or deny with recommendations.

## 2025-05-06 NOTE — TELEPHONE ENCOUNTER
Hi Dr. Naranjo,    Patient only has 3 days left of insulin. He is also requesting a refill on hydrOXYzine HCl (ATARAX) 50 MG tablet as well. Once refilled please let me know so I can inform patient.

## 2025-05-08 DIAGNOSIS — K21.9 GASTROESOPHAGEAL REFLUX DISEASE, UNSPECIFIED WHETHER ESOPHAGITIS PRESENT: ICD-10-CM

## 2025-05-08 DIAGNOSIS — F41.9 ANXIETY AND DEPRESSION: ICD-10-CM

## 2025-05-08 DIAGNOSIS — F32.A ANXIETY AND DEPRESSION: ICD-10-CM

## 2025-05-08 NOTE — TELEPHONE ENCOUNTER
Please address. Request was put in on 5/5/2025 and he spoke with the pharmacy today and they informed him they still have not received anything. Please let me know when both medication are filled so I can inform patient  he only has a few days left.

## 2025-05-08 NOTE — TELEPHONE ENCOUNTER
Medication Refill Request    Jhonny LEÓN Cook Jr. is requesting a refill of the following medication(s):   Requested Prescriptions     Pending Prescriptions Disp Refills    hydrOXYzine HCl (ATARAX) 50 MG tablet 90 tablet 0     Sig: TAKE 2 TABLETS BY MOUTH EVERY 6 HOURS AS NEEDED FOR ANXIETY    omeprazole (PRILOSEC) 40 MG delayed release capsule 30 capsule 1     Sig: Take 1 capsule by mouth daily        Listed PCP is Laina Snow MD   Last provider to prescribe medication: Dr. Naranjo  Last Date of Medication Prescribed: 03/25/25   Date of Last Office Visit at [facility]: 04/14/25   FUTURE APPOINTMENT: No future appointments.    Please send refill to:    [facility] [address] [place] [address] [id number] [place] [address] [phone] - F [phone]  [address] [place] [address]  Phone: [phone] Fax: [phone]      Please review request and approve or deny with recommendations.

## 2025-05-09 RX ORDER — HUMAN INSULIN 100 [IU]/ML
20 INJECTION, SUSPENSION SUBCUTANEOUS
Qty: 15 ML | Refills: 0 | Status: SHIPPED | OUTPATIENT
Start: 2025-05-09

## 2025-05-09 RX ORDER — OMEPRAZOLE 40 MG/1
40 CAPSULE, DELAYED RELEASE ORAL DAILY
Qty: 30 CAPSULE | Refills: 2 | Status: SHIPPED | OUTPATIENT
Start: 2025-05-09 | End: 2025-08-07

## 2025-05-09 RX ORDER — HYDROXYZINE HYDROCHLORIDE 50 MG/1
TABLET, FILM COATED ORAL
Qty: 30 TABLET | Refills: 0 | Status: SHIPPED | OUTPATIENT
Start: 2025-05-09

## 2025-05-20 DIAGNOSIS — E03.9 HYPOTHYROIDISM, UNSPECIFIED TYPE: ICD-10-CM

## 2025-05-20 DIAGNOSIS — E11.65 TYPE 2 DIABETES MELLITUS WITH HYPERGLYCEMIA, WITHOUT LONG-TERM CURRENT USE OF INSULIN (HCC): ICD-10-CM

## 2025-05-20 NOTE — TELEPHONE ENCOUNTER
Hi Dr. Naranjo,    Patient is requesting a refill of:    Levothyroxine (SYNTHROID) 150 MCG tablet     Please send to:    Pharmacy    [facility] [address] [place] [address] [facility] [phone] F [phone] [address] [facility] [address]  Phone: [phone]  Fax: [phone]

## 2025-05-20 NOTE — TELEPHONE ENCOUNTER
Patient also stated that the pharmacy informed him that they did not have a prescription for   insulin NPH (NOVOLIN N FLEXPEN) 100 UNIT/ML injection pen I tried to reach out to the pharmacy but was on hold for a while. I will try to reach back out tomorrow to get clarification.

## 2025-05-21 NOTE — TELEPHONE ENCOUNTER
Hi Dr. Naranjo,     Patient is requesting a refill of:     Levothyroxine (SYNTHROID) 150 MCG tablet      Please send to:     Pharmacy     [facility] [address] [place] [address] [facility] [phone] F [phone] [address] [facility] [address]  Phone: [phone]  Fax: [phone]

## 2025-05-21 NOTE — TELEPHONE ENCOUNTER
Writer called the pt's [facility] pharmacy regarding the Novolin N kwikpen 100units/ml. Spoke to Mragarita the pharmacist. She states that the pt just picked up the Novolin N kwikpen 100units/ml on 05/08/25 and isn't due for a refill until 06/08/25.

## 2025-05-21 NOTE — TELEPHONE ENCOUNTER
Medication Refill Request    Jhonny LEÓN Cook Jr. is requesting a refill of the following medication(s):   Requested Prescriptions     Pending Prescriptions Disp Refills    levothyroxine (SYNTHROID) 150 MCG tablet 60 tablet 0     Sig: TAKE 2 TABLETS BY MOUTH ONCE DAILY BEFORE BREAKFAST    Insulin Pen Needle (PEN NEEDLES) 32G X 6 MM MISC 100 each 0     Si each by Does not apply route daily        Listed PCP is Laina Snow MD   Last provider to prescribe medication: Dr. Naranjo  Last Date of Medication Prescribed: 25   Date of Last Office Visit at [facility]: 25   FUTURE APPOINTMENT: No future appointments.    Please send refill to:    [facility] [address] [place] [address] [id number] [place] [address] [phone] - F [phone]  [address] [place] [address]  Phone: [phone] Fax: [phone]      Please review request and approve or deny with recommendations.

## 2025-05-21 NOTE — TELEPHONE ENCOUNTER
Also, it was a miscommunication patient kept asking for a refill on the novolin pen but after speaking with the patient and pharmacist on 3 way patient is actually needing a refill on   Insulin Pen Needle (PEN NEEDLES) 32G X 6 MM MISC  and levothyroxine (SYNTHROID) 150 MCG tablet

## 2025-05-22 RX ORDER — BLOOD SUGAR DIAGNOSTIC
1 STRIP MISCELLANEOUS DAILY
Qty: 100 EACH | Refills: 0 | Status: SHIPPED | OUTPATIENT
Start: 2025-05-22

## 2025-05-22 RX ORDER — LEVOTHYROXINE SODIUM 150 UG/1
TABLET ORAL
Qty: 60 TABLET | Refills: 1 | Status: SHIPPED | OUTPATIENT
Start: 2025-05-22 | End: 2025-05-22

## 2025-05-22 RX ORDER — LEVOTHYROXINE SODIUM 150 UG/1
TABLET ORAL
Qty: 60 TABLET | Refills: 1 | Status: SHIPPED | OUTPATIENT
Start: 2025-05-22

## 2025-06-02 ENCOUNTER — TELEPHONE (OUTPATIENT)
Age: 55
End: 2025-06-02

## 2025-06-02 NOTE — TELEPHONE ENCOUNTER
Hi Dr. Naranjo,    Patient is requesting a refill on these 3 medication. He's asking if you can put refills on these medication so he does not have to call in every month for refills.      hydrOXYzine HCl (ATARAX) 50 MG tablet   busPIRone (BUSPAR) 15 MG tablet   bisoprolol-hydroCHLOROthiazide (ZIAC) 5-6.25 MG per tablet       Pharmacy    [id number] [place] [address] [phone] - F [phone]  [id number] [place] [address]  Phone: [phone]  Fax: [phone]

## 2025-06-03 DIAGNOSIS — I10 ESSENTIAL (PRIMARY) HYPERTENSION: ICD-10-CM

## 2025-06-03 DIAGNOSIS — F41.9 ANXIETY AND DEPRESSION: ICD-10-CM

## 2025-06-03 DIAGNOSIS — F32.A ANXIETY AND DEPRESSION: ICD-10-CM

## 2025-06-03 RX ORDER — BUSPIRONE HYDROCHLORIDE 15 MG/1
15 TABLET ORAL 2 TIMES DAILY
Qty: 90 TABLET | Refills: 1 | Status: SHIPPED | OUTPATIENT
Start: 2025-06-03

## 2025-06-03 RX ORDER — BISOPROLOL FUMARATE AND HYDROCHLOROTHIAZIDE 5; 6.25 MG/1; MG/1
1 TABLET ORAL DAILY
Qty: 90 TABLET | Refills: 1 | Status: SHIPPED | OUTPATIENT
Start: 2025-06-03

## 2025-06-03 RX ORDER — HYDROXYZINE HYDROCHLORIDE 50 MG/1
TABLET, FILM COATED ORAL
Qty: 30 TABLET | Refills: 0 | Status: SHIPPED | OUTPATIENT
Start: 2025-06-03

## 2025-06-03 NOTE — TELEPHONE ENCOUNTER
Medication Refill Request    Jhonny Cook  is requesting a refill of the following medication(s):   Requested Prescriptions     Pending Prescriptions Disp Refills    hydrOXYzine HCl (ATARAX) 50 MG tablet 30 tablet 0     Sig: TAKE 2 TABLETS BY MOUTH EVERY 6 HOURS AS NEEDED FOR ANXIETY    bisoprolol-hydroCHLOROthiazide (ZIAC) 5-6.25 MG per tablet 90 tablet 1     Sig: Take 1 tablet by mouth daily    busPIRone (BUSPAR) 15 MG tablet 90 tablet 1     Sig: Take 15 mg by mouth 2 times daily        Listed PCP is Laina Snow MD   Last provider to prescribe medication: Dr. Naranjo  Last Date of Medication Prescribed: 05/09/25   Date of Last Office Visit at [facility]: 04/14/25   FUTURE APPOINTMENT: No future appointments.    Please send refill to:    [facility] [address] [place] [address] [id number] [place] [address] [phone] - F [phone]  [address] [place] [address]  Phone: [phone] Fax: [phone]      Please review request and approve or deny with recommendations.

## 2025-06-10 DIAGNOSIS — E78.5 DYSLIPIDEMIA: ICD-10-CM

## 2025-06-10 RX ORDER — ATORVASTATIN CALCIUM 80 MG/1
80 TABLET, FILM COATED ORAL DAILY
Qty: 90 TABLET | Refills: 1 | Status: SHIPPED | OUTPATIENT
Start: 2025-06-10

## 2025-06-10 NOTE — TELEPHONE ENCOUNTER
Medication Refill Request    Jhonny Cook Jr. is requesting a refill of the following medication(s):   Requested Prescriptions     Pending Prescriptions Disp Refills    atorvastatin (LIPITOR) 80 MG tablet 90 tablet 1     Sig: Take 1 tablet by mouth daily        Listed PCP is Sammie Naranoj MD  Date of Last Office Visit at [facility]:  04/14/2025  FUTURE APPOINTMENT: No future appointments.    Please send refill to:    [id number] [place] [address] [phone] - F [phone]  [id number] [place] [address]  Phone: [phone] Fax: [phone]      Please review request and approve or deny with recommendations.

## 2025-06-20 DIAGNOSIS — F41.9 ANXIETY AND DEPRESSION: ICD-10-CM

## 2025-06-20 DIAGNOSIS — F32.A ANXIETY AND DEPRESSION: ICD-10-CM

## 2025-06-20 DIAGNOSIS — E11.65 TYPE 2 DIABETES MELLITUS WITH HYPERGLYCEMIA, WITHOUT LONG-TERM CURRENT USE OF INSULIN (HCC): ICD-10-CM

## 2025-06-20 RX ORDER — HUMAN INSULIN 100 [IU]/ML
20 INJECTION, SUSPENSION SUBCUTANEOUS
Qty: 15 ML | Refills: 0 | OUTPATIENT
Start: 2025-06-20

## 2025-06-20 RX ORDER — HYDROXYZINE HYDROCHLORIDE 50 MG/1
TABLET, FILM COATED ORAL
Qty: 30 TABLET | Refills: 0 | OUTPATIENT
Start: 2025-06-20

## 2025-06-20 NOTE — TELEPHONE ENCOUNTER
Medication Refill Request    Jhonny LEÓN Cook Jr. is requesting a refill of the following medication(s):   Requested Prescriptions     Pending Prescriptions Disp Refills    hydrOXYzine HCl (ATARAX) 50 MG tablet 30 tablet 0     Sig: TAKE 2 TABLETS BY MOUTH EVERY 6 HOURS AS NEEDED FOR ANXIETY    insulin NPH (NOVOLIN N FLEXPEN) 100 UNIT/ML injection pen 15 mL 0     Sig: Inject 20 Units into the skin 2 times daily (before meals)        Listed PCP is Gracious Laina Naranjo MD   Last provider to prescribe medication: Dr. Naranjo  Last Date of Medication Prescribed: 06/03/25   Date of Last Office Visit at [facility]: 04/14/25   FUTURE APPOINTMENT: No future appointments.    Please send refill to:    [facility] [address] [place] [address] [id number] [place] [address] [phone] - F [phone]  [address] [place] [address]  Phone: [phone] Fax: [phone]      Please review request and approve or deny with recommendations.

## 2025-06-20 NOTE — TELEPHONE ENCOUNTER
Pt called to request refills of the following medications to be sent to [facility] [address] [place] [address] [facility] [phone] - F [phone] :      insulin NPH (NOVOLIN N FLEXPEN) 100 UNIT/ML injection pen     hydrOXYzine HCl (ATARAX) 50 MG tablet     Thank you

## 2025-06-23 DIAGNOSIS — F41.9 ANXIETY AND DEPRESSION: ICD-10-CM

## 2025-06-23 DIAGNOSIS — F32.A ANXIETY AND DEPRESSION: ICD-10-CM

## 2025-06-23 NOTE — TELEPHONE ENCOUNTER
Medication Refill Request    Jhonny LEÓN Cook Jr. is requesting a refill of the following medication(s):   Requested Prescriptions     Pending Prescriptions Disp Refills    hydrOXYzine HCl (ATARAX) 50 MG tablet 30 tablet 0     Sig: TAKE 2 TABLETS BY MOUTH EVERY 6 HOURS AS NEEDED FOR ANXIETY        Listed PCP is Laina Snow MD   Last provider to prescribe medication: Dr. Naranjo  Last Date of Medication Prescribed: 06/03/25   Date of Last Office Visit at [facility]: 04/14/25   FUTURE APPOINTMENT: No future appointments.    Please send refill to:    [facility] [address] [place] [address] [id number] [place] [address] [phone] -  [phone]  [address] [place] [address]  Phone: [phone] Fax: [phone]      Please review request and approve or deny with recommendations.

## 2025-06-26 RX ORDER — HYDROXYZINE HYDROCHLORIDE 50 MG/1
TABLET, FILM COATED ORAL
Qty: 30 TABLET | Refills: 0 | OUTPATIENT
Start: 2025-06-26

## 2025-07-21 DIAGNOSIS — F41.9 ANXIETY AND DEPRESSION: ICD-10-CM

## 2025-07-21 DIAGNOSIS — F32.A ANXIETY AND DEPRESSION: ICD-10-CM

## 2025-07-21 NOTE — TELEPHONE ENCOUNTER
Medication Refill Request    Jhonny LEÓN Cook Jr. is requesting a refill of the following medication(s):   Requested Prescriptions     Pending Prescriptions Disp Refills    hydrOXYzine HCl (ATARAX) 50 MG tablet 30 tablet 1     Sig: TAKE 2 TABLETS BY MOUTH EVERY 6 HOURS AS NEEDED FOR ANXIETY        Listed PCP is Laina Snow MD   Last provider to prescribe medication: Dr. Naranjo  Last Date of Medication Prescribed: 06/24/25   Date of Last Office Visit at [facility]: 04/14/25   FUTURE APPOINTMENT: No future appointments.    Please send refill to:    [facility] [address] [place] [address] [id number] [place] [address] [phone] -  [phone]  [address] [place] [address]  Phone: [phone] Fax: [phone]      Please review request and approve or deny with recommendations.

## 2025-07-23 RX ORDER — HYDROXYZINE HYDROCHLORIDE 50 MG/1
TABLET, FILM COATED ORAL
Qty: 30 TABLET | Refills: 1 | Status: SHIPPED | OUTPATIENT
Start: 2025-07-23

## 2025-07-31 DIAGNOSIS — E11.65 TYPE 2 DIABETES MELLITUS WITH HYPERGLYCEMIA, WITHOUT LONG-TERM CURRENT USE OF INSULIN (HCC): ICD-10-CM

## 2025-07-31 RX ORDER — PEN NEEDLE, DIABETIC 32 GX 1/4"
NEEDLE, DISPOSABLE MISCELLANEOUS
Qty: 100 EACH | Refills: 2 | Status: SHIPPED | OUTPATIENT
Start: 2025-07-31

## 2025-07-31 NOTE — TELEPHONE ENCOUNTER
Medication Refill Request    Jhonny Cook Jr. is requesting a refill of the following medication(s):   Requested Prescriptions     Pending Prescriptions Disp Refills    BD PEN NEEDLE MICRO U/F 32G X 6 MM MISC [Pharmacy Med Name: BD UF MCRO PEN NDL [id number] MIS] 100 each 0     Sig: USE 1 PEN NEEDLE SUBCUTANEOUSLY ONCE DAILY        Listed PCP is Laina Snow MD   Last provider to prescribe medication: Dr. Leigh  Last Date of Medication Prescribed: 05/22/2025   Date of Last Office Visit at [facility]: 04/14/25   FUTURE APPOINTMENT: No future appointments.    Please send refill to:    [facility] [address] [place] [address] [id number] [place] [address] [phone] -  [phone]  [address] [place] [address]  Phone: [phone] Fax: [phone]      Please review request and approve or deny with recommendations.

## 2025-08-01 ENCOUNTER — TELEPHONE (OUTPATIENT)
Age: 55
End: 2025-08-01

## 2025-08-01 NOTE — TELEPHONE ENCOUNTER
----- Message from Dr. Laina Naranjo MD sent at 7/23/2025  8:02 AM EDT -----  Regarding: follow up  Amor Spence, please call this patient to schedule a diabetes and anxiety follow up with me. Thanks!

## 2025-08-21 ENCOUNTER — TELEPHONE (OUTPATIENT)
Age: 55
End: 2025-08-21

## 2025-08-26 DIAGNOSIS — E11.65 TYPE 2 DIABETES MELLITUS WITH HYPERGLYCEMIA, WITHOUT LONG-TERM CURRENT USE OF INSULIN (HCC): ICD-10-CM

## 2025-08-27 DIAGNOSIS — E11.65 TYPE 2 DIABETES MELLITUS WITH HYPERGLYCEMIA, WITHOUT LONG-TERM CURRENT USE OF INSULIN (HCC): ICD-10-CM
